# Patient Record
Sex: FEMALE | Race: WHITE | NOT HISPANIC OR LATINO | Employment: OTHER | URBAN - METROPOLITAN AREA
[De-identification: names, ages, dates, MRNs, and addresses within clinical notes are randomized per-mention and may not be internally consistent; named-entity substitution may affect disease eponyms.]

---

## 2017-03-08 ENCOUNTER — OFFICE VISIT (OUTPATIENT)
Dept: OTOLARYNGOLOGY | Facility: CLINIC | Age: 62
End: 2017-03-08
Payer: COMMERCIAL

## 2017-03-08 VITALS — WEIGHT: 207.6 LBS | HEIGHT: 67 IN | BODY MASS INDEX: 32.58 KG/M2 | RESPIRATION RATE: 18 BRPM

## 2017-03-08 DIAGNOSIS — J01.01 ACUTE RECURRENT MAXILLARY SINUSITIS: ICD-10-CM

## 2017-03-08 DIAGNOSIS — H69.91 DYSFUNCTION OF EUSTACHIAN TUBE, RIGHT: ICD-10-CM

## 2017-03-08 DIAGNOSIS — J31.0 CHRONIC RHINITIS: Primary | ICD-10-CM

## 2017-03-08 PROCEDURE — 99203 OFFICE O/P NEW LOW 30 MIN: CPT | Performed by: OTOLARYNGOLOGY

## 2017-03-08 ASSESSMENT — PAIN SCALES - GENERAL: PAINLEVEL: NO PAIN (0)

## 2017-03-08 NOTE — MR AVS SNAPSHOT
"              After Visit Summary   3/8/2017    Maria Ines Molina    MRN: [id number]           Patient Information     Date Of Birth          1955        Visit Information        Provider Department      3/8/2017 11:30 AM Zia Aguayo MD [facility]        Care Instructions    - allegra or zyrtec daily for allergies  - sudafed or over-the-counter nasal decongestant 30-45 minutes before flying or for ear  - \"pop\" as needed  - nasal saline rinses.        Follow-ups after your visit        Who to contact     If you have questions or need follow up information about today's clinic visit or your schedule please contact [facility] directly at [phone].  Normal or non-critical lab and imaging results will be communicated to you by [employer], letter or phone within 4 business days after the clinic has received the results. If you do not hear from us within 7 days, please contact the clinic through MyChart or phone. If you have a critical or abnormal lab result, we will notify you by phone as soon as possible.  Submit refill requests through [employer] or call your pharmacy and they will forward the refill request to us. Please allow 3 business days for your refill to be completed.          Additional Information About Your Visit        [employer] Information     [employer] lets you send messages to your doctor, view your test results, renew your prescriptions, schedule appointments and more. To sign up, go to [email or web address] . Click on \"Log in\" on the left side of the screen, which will take you to the Welcome page. Then click on \"Sign up Now\" on the right side of the page.     You will be asked to enter the access code listed below, as well as some personal information. Please follow the directions to create your username and password.     Your access code is: [email or web address]  Expires: 2017 11:51 AM     Your access code will  in 90 days. If you need help or a new code, please " "call your [place] clinic or [phone].        Care EveryWhere ID     This is your Care EveryWhere ID. This could be used by other organizations to access your [place] medical records  [id number]        Your Vitals Were     Respirations Height BMI (Body Mass Index)             18 1.69 m (5' 6.53\") 32.97 kg/m2          Blood Pressure from Last 3 Encounters:   03/18/15 150/80    Weight from Last 3 Encounters:   03/08/17 94.2 kg (207 lb 9.6 oz)              Today, you had the following     No orders found for display       Primary Care Provider    Unknown Primary MD José Manuel       No address on file        Thank you!     Thank you for choosing [facility]  for your care. Our goal is always to provide you with excellent care. Hearing back from our patients is one way we can continue to improve our services. Please take a few minutes to complete the written survey that you may receive in the mail after your visit with us. Thank you!             Your Updated Medication List - Protect others around you: Learn how to safely use, store and throw away your medicines at www.disposemymeds.org.      Notice  As of 3/8/2017 11:51 AM    You have not been prescribed any medications.      "

## 2017-03-08 NOTE — PATIENT INSTRUCTIONS
"- allegra or zyrtec daily for allergies  - sudafed or over-the-counter nasal decongestant 30-45 minutes before flying or for ear  - \"pop\" as needed  - nasal saline rinses.  "

## 2017-03-08 NOTE — PROGRESS NOTES
"Chief Complaint - sinusitis    History of Present Illness - Maria Ines Molina is a 62 year old female who presents for evaluation of possible recurrent sinusitis. The patient describes symptoms of itchy eyes, nasal drainage (white, sometimes green/yellow), cough, right ear plugging for the past few months (intermittently). She had right ear stinging with alcohol irrigations. These symptoms have recurred 3 times in the past year, and usually is only an issue once per year.  Treatments within the last 3 months have included antibiotics, over-the-counter medication, neti pot. No prior history of sinus surgery.    Past Medical History - healthy    Current Medications - vitamins    Allergies - No Known Allergies    Social History -   Social History     Social History     Marital status:      Spouse name: N/A     Number of children: N/A     Years of education: N/A     Social History Main Topics     Smoking status: Not on file     Smokeless tobacco: Not on file     Alcohol use Not on file     Drug use: Not on file     Sexual activity: Not on file     Other Topics Concern     Not on file     Social History Narrative     No narrative on file       Family History - sister had thyroid nodules.    Review of Systems - As per HPI and PMHx, some tender and swollen lymph nodes, otherwise 7 system review of the head and neck negative.    Physical Exam  Resp 18  Ht 1.69 m (5' 6.53\")  Wt 94.2 kg (207 lb 9.6 oz)  BMI 32.97 kg/m2  General - The patient is nontoxic, in no distress. Alert and oriented to person and place, answers questions and cooperates with examination appropriately.   Neurologic - CN II-XII are intact. No focal neurologic deficits.   Voice and Breathing - The patient was breathing comfortably without the use of accessory muscles. There was no wheezing, stridor, or stertor.  The patients voice was clear and strong.  Eyes - Extraocular movements intact.  Sclera were not icteric or injected, conjunctiva were pink and " moist.  Mouth - Examination of the oral cavity showed pink, healthy oral mucosa. No lesions or ulcerations noted.  The tongue was mobile and midline.  Throat - The walls of the oropharynx were smooth, symmetric, and had no lesions or ulcerations.  No postnasal drainage.  The uvula was midline on elevation.  Ears - Bilateral pinna and EACs with normal appearing overlying skin. Tympanic membrane intact bilaterally. No perforation on the right today. She can inflate the right ear. Bony landmarks of the ossicular chain are normal. No retraction, perforation, or masses.  No fluid or purulence was seen in the external canal or the middle ear.   Nose - External contour is symmetric, no gross deflection or scars.  Nasal mucosa is pink and moist with no abnormal mucus.  The septum was midline and non-obstructive, turbinates of normal size and position.  No polyps, masses, or purulence noted on examination.  Neck - Palpation of the occipital, submental, submandibular, internal jugular chain, and supraclavicular nodes did not demonstrate any abnormal lymph nodes or masses. No parotid masses. Palpation of the thyroid was soft and smooth, with no nodules or goiter appreciated.  The trachea was mobile and midline.        A/P - Maria Ines Molina is a 62 year old female with recurrent sinusitis versus viral upper respiratory infections. She has had 3 episodes, usually with flights to [place]. Maybe has some allergies in the [place] environment. I recommend treatment with nasal saline rinses, i offered flonase but she deferred. She should try an antihistamine when traveling. I also offered allergy testing if this fails. She can use sudafed with flights. right ear looks okay, probably some ETD. Okay to swim, no water precautions necessary. Return prn.     Zia Aguayo MD  Otolaryngology  [facility]

## 2017-10-12 ENCOUNTER — OFFICE VISIT (OUTPATIENT)
Dept: OTOLARYNGOLOGY | Facility: CLINIC | Age: 62
End: 2017-10-12
Payer: COMMERCIAL

## 2017-10-12 VITALS — WEIGHT: 212 LBS | BODY MASS INDEX: 33.27 KG/M2 | HEIGHT: 67 IN | TEMPERATURE: 98 F

## 2017-10-12 DIAGNOSIS — H69.91 DYSFUNCTION OF EUSTACHIAN TUBE, RIGHT: Primary | ICD-10-CM

## 2017-10-12 DIAGNOSIS — J06.9 VIRAL URI: ICD-10-CM

## 2017-10-12 PROCEDURE — 99213 OFFICE O/P EST LOW 20 MIN: CPT | Performed by: OTOLARYNGOLOGY

## 2017-10-12 NOTE — MR AVS SNAPSHOT
After Visit Summary   10/12/2017    Maria Ines Molina    MRN: [id number]           Patient Information     Date Of Birth          1955        Visit Information        Provider Department      10/12/2017 8:30 AM [facility]        Today's Diagnoses     Dysfunction of Eustachian tube, right    -  1    Viral URI          Care Instructions    General Scheduling Information  To schedule your CT/MRI scan, please contact [place] [facility] at [phone]   [address] [place] [address]    To schedule your Surgery, please contact our Specialty Schedulers at [phone]    ENT Clinic Locations Clinic Hours Telephone Number     [facility]  [address]  [place] [address]   Tuesday:       8:00am -- 4:00pm    Wednesday:  8:00am - 4:00pm   To schedule an appointment with   Dr. Aguayo,   please contact our   Specialty Scheduling Department at:     [phone]       [facility]  [address] [place] [address]   Friday:          8:00am - 4:00pm         Urgent Care Locations Clinic Hours Telephone Numbers     [facility]  [address] [place] [address]     Monday-Friday:     11:00pm - 9:00pm    Saturday-Sunday:  9:00am - 5:00pm   [phone]     [facility]  [address] [place] [address]     Monday-Friday:      5:00pm - 9:00pm     Saturday-Sunday:  9:00am - 5:00pm   [phone]                 Follow-ups after your visit        Who to contact     If you have questions or need follow up information about today's clinic visit or your schedule please contact [facility] directly at [phone].  Normal or non-critical lab and imaging results will be communicated to you by MyChart, letter or phone within 4 business days after the clinic has received the results. If you do not hear from us within 7 days, please contact the clinic through MyChart or phone. If you have a critical or abnormal  "lab result, we will notify you by phone as soon as possible.  Submit refill requests through [employer] or call your pharmacy and they will forward the refill request to us. Please allow 3 business days for your refill to be completed.          Additional Information About Your Visit        MyChart Information     [employer] lets you send messages to your doctor, view your test results, renew your prescriptions, schedule appointments and more. To sign up, go to [email or web address] . Click on \"Log in\" on the left side of the screen, which will take you to the Welcome page. Then click on \"Sign up Now\" on the right side of the page.     You will be asked to enter the access code listed below, as well as some personal information. Please follow the directions to create your username and password.     Your access code is: [email or web address]  Expires: 1/10/2018  9:13 AM     Your access code will  in 90 days. If you need help or a new code, please call your [place] clinic or [phone].        Care EveryWhere ID     This is your Care EveryWhere ID. This could be used by other organizations to access your [place] medical records  [id number]        Your Vitals Were     Temperature Height BMI (Body Mass Index)             98  F (36.7  C) (Tympanic) 1.689 m (5' 6.5\") 33.71 kg/m2          Blood Pressure from Last 3 Encounters:   03/18/15 150/80    Weight from Last 3 Encounters:   10/12/17 96.2 kg (212 lb)   17 94.2 kg (207 lb 9.6 oz)              Today, you had the following     No orders found for display       Primary Care Provider    None Specified       No primary provider on file.        Equal Access to Services     [facility]: Hadii jose Hernandez, waaxda luqadaha, qaybta kaalmada champ, carolyn pereira . So [facility] [phone].    ATENCIÓN: Si habla español, tiene a song disposición servicios gratuitos de asistencia lingüística. Llame al [phone].    We comply with " applicable federal civil rights laws and [place] laws. We do not discriminate on the basis of race, color, national origin, age, disability, sex, sexual orientation, or gender identity.            Thank you!     Thank you for choosing [facility]  for your care. Our goal is always to provide you with excellent care. Hearing back from our patients is one way we can continue to improve our services. Please take a few minutes to complete the written survey that you may receive in the mail after your visit with us. Thank you!             Your Updated Medication List - Protect others around you: Learn how to safely use, store and throw away your medicines at www.disposemymeds.org.      Notice  As of 10/12/2017  9:13 AM    You have not been prescribed any medications.

## 2017-10-12 NOTE — PATIENT INSTRUCTIONS
General Scheduling Information  To schedule your CT/MRI scan, please contact [place] [facility] at [phone]   [address] [place] [address]    To schedule your Surgery, please contact our Specialty Schedulers at [phone]    ENT Clinic Locations Clinic Hours Telephone Number     [facility]  [address] [place] [address]   Tuesday:       8:00am -- 4:00pm    Wednesday:  8:00am - 4:00pm   To schedule an appointment with   Dr. Aguayo,   please contact our   Specialty Scheduling Department at:     [phone]       [facility]  [address] [place] [address]   Friday:          8:00am - 4:00pm         Urgent Care Locations Clinic Hours Telephone Numbers     [facility]  [address] [place] [address]     Monday-Friday:     11:00pm - 9:00pm    Saturday-Sunday:  9:00am - 5:00pm   [phone]     [facility]  [address] [place] [address]     Monday-Friday:      5:00pm - 9:00pm     Saturday-Sunday:  9:00am - 5:00pm   [phone]

## 2017-10-12 NOTE — NURSING NOTE
"Chief Complaint   Patient presents with     Ear Problem     ear pain        Initial Temp 98  F (36.7  C) (Tympanic)  Ht 1.689 m (5' 6.5\")  Wt 96.2 kg (212 lb)  BMI 33.71 kg/m2 Estimated body mass index is 33.71 kg/(m^2) as calculated from the following:    Height as of this encounter: 1.689 m (5' 6.5\").    Weight as of this encounter: 96.2 kg (212 lb).  Medication Reconciliation: complete       Jerry Vega MA      "

## 2017-10-12 NOTE — PROGRESS NOTES
"Chief Complaint - recheck nose and ears    History of Present Illness - Maria Ines Molina is a 62 year old female who returns for evaluation of possible recurrent sinusitis versus recurrent upper respiratory infections. I saw her 3/2017 for this. At that time the patient described symptoms of itchy eyes, nasal drainage (white, sometimes green/yellow), cough, ear plugging. These symptoms were a recurrent problem which was unusual for her. Treatments have included antibiotics, over-the-counter medication, neti pot. No prior history of sinus surgery.    She returns and notes she felt sick 10 days ago. Had right ear pain, postnasal drainage, green, and sore throat. She is better. Still has myalgias and arthralgias. Her right ear still bothers her some. She tried claritin, mucinex.     Past Medical History - healthy    Current Medications - vitamins    Allergies - No Known Allergies    Social History -   Social History     Social History     Marital status:      Spouse name: N/A     Number of children: N/A     Years of education: N/A     Social History Main Topics     Smoking status: Not on file     Smokeless tobacco: Not on file     Alcohol use Not on file     Drug use: Not on file     Sexual activity: Not on file     Other Topics Concern     Not on file     Social History Narrative     No narrative on file   Nonsmoker.    Family History - sister had thyroid nodules.    Review of Systems - As per HPI and PMHx, one episode of blurry vision for 1 minute. Otherwise 7 system review of the head and neck negative.    Physical Exam  Temp 98  F (36.7  C) (Tympanic)  Ht 1.689 m (5' 6.5\")  Wt 96.2 kg (212 lb)  BMI 33.71 kg/m2  General - The patient is nontoxic, in no distress. Alert and oriented to person and place, answers questions and cooperates with examination appropriately.   Neurologic - CN II-XII are intact. No focal neurologic deficits.   Voice and Breathing - The patient was breathing comfortably without the use " of accessory muscles. There was no wheezing, stridor, or stertor.  The patients voice was clear and strong.  Eyes - Extraocular movements intact.  Sclera were not icteric or injected, conjunctiva were pink and moist.  Mouth - Examination of the oral cavity showed pink, healthy oral mucosa. No lesions or ulcerations noted.  The tongue was mobile and midline.  Throat - The walls of the oropharynx were smooth, symmetric, and had no lesions or ulcerations.  No postnasal drainage.  The uvula was midline on elevation.  Ears - Bilateral pinna and EACs with normal appearing overlying skin. Tympanic membrane intact bilaterally. No perforation on the right today. Bony landmarks of the ossicular chain are normal. No retraction, perforation, or masses.  No fluid or purulence was seen in the external canal or the middle ear.   Nose - External contour is symmetric, no gross deflection or scars.  Nasal mucosa is pink and moist with no abnormal mucus.  The septum was midline and non-obstructive, turbinates of normal size and position.  No polyps, masses, or purulence noted on examination.  Neck - Palpation of the occipital, submental, submandibular, internal jugular chain, and supraclavicular nodes did not demonstrate any abnormal lymph nodes or masses. No parotid masses. Palpation of the thyroid was soft and smooth, with no nodules or goiter appreciated.  The trachea was mobile and midline.        A/P - Maria Ines Molina is a 62 year old female with a recent viral upper respiratory infection. First in 6 months since I last saw her. She gets intermittent right ear aches with these episodes of upper respiratory infection. This seems like eustachian tube dysfunction. I recommend treatment with nasal saline rinses, sudafed, and mucinex prn. Okay to swim, no water precautions necessary. Return prn.     Zia Aguayo MD  Otolaryngology  [facility]

## 2018-02-27 ENCOUNTER — DOCUMENTATION ONLY (OUTPATIENT)
Dept: FAMILY MEDICINE | Facility: OTHER | Age: 63
End: 2018-02-27

## 2018-02-27 PROBLEM — M17.9 KNEE OSTEOARTHRITIS: Status: ACTIVE | Noted: 2018-02-27

## 2018-02-27 PROBLEM — E66.9 OBESITY: Status: ACTIVE | Noted: 2018-02-27

## 2019-03-06 ENCOUNTER — TRANSFERRED RECORDS (OUTPATIENT)
Dept: HEALTH INFORMATION MANAGEMENT | Facility: OTHER | Age: 64
End: 2019-03-06

## 2020-07-19 ENCOUNTER — HOSPITAL ENCOUNTER (EMERGENCY)
Facility: CLINIC | Age: 65
Discharge: HOME | End: 2020-07-19
Attending: PHYSICIAN ASSISTANT | Admitting: PHYSICIAN ASSISTANT
Payer: MEDICARE

## 2020-07-19 ENCOUNTER — APPOINTMENT (OUTPATIENT)
Dept: ULTRASOUND IMAGING | Facility: CLINIC | Age: 65
End: 2020-07-19
Attending: PHYSICIAN ASSISTANT
Payer: MEDICARE

## 2020-07-19 VITALS
HEART RATE: 76 BPM | DIASTOLIC BLOOD PRESSURE: 131 MMHG | BODY MASS INDEX: 31.8 KG/M2 | OXYGEN SATURATION: 100 % | SYSTOLIC BLOOD PRESSURE: 169 MMHG | TEMPERATURE: 97.6 F | RESPIRATION RATE: 16 BRPM | WEIGHT: 200 LBS

## 2020-07-19 DIAGNOSIS — R10.11 RUQ ABDOMINAL PAIN: ICD-10-CM

## 2020-07-19 LAB
ALBUMIN UR-MCNC: NEGATIVE MG/DL
APPEARANCE UR: CLEAR
BILIRUB UR QL STRIP: NEGATIVE
COLOR UR AUTO: YELLOW
GLUCOSE UR STRIP-MCNC: NEGATIVE MG/DL
HGB UR QL STRIP: NEGATIVE
KETONES UR STRIP-MCNC: 5 MG/DL
LEUKOCYTE ESTERASE UR QL STRIP: ABNORMAL
MUCOUS THREADS #/AREA URNS LPF: PRESENT /LPF
NITRATE UR QL: NEGATIVE
PH UR STRIP: 7 PH (ref 5–7)
RBC #/AREA URNS AUTO: 1 /HPF (ref 0–2)
SOURCE: ABNORMAL
SP GR UR STRIP: 1.01 (ref 1–1.03)
SQUAMOUS #/AREA URNS AUTO: 1 /HPF (ref 0–1)
UROBILINOGEN UR STRIP-MCNC: 0 MG/DL (ref 0–2)
WBC #/AREA URNS AUTO: 3 /HPF (ref 0–5)

## 2020-07-19 PROCEDURE — 81001 URINALYSIS AUTO W/SCOPE: CPT | Performed by: PHYSICIAN ASSISTANT

## 2020-07-19 PROCEDURE — 99284 EMERGENCY DEPT VISIT MOD MDM: CPT | Mod: 25 | Performed by: PHYSICIAN ASSISTANT

## 2020-07-19 PROCEDURE — 87086 URINE CULTURE/COLONY COUNT: CPT | Performed by: PHYSICIAN ASSISTANT

## 2020-07-19 PROCEDURE — 76705 ECHO EXAM OF ABDOMEN: CPT

## 2020-07-19 PROCEDURE — 99284 EMERGENCY DEPT VISIT MOD MDM: CPT | Mod: Z6 | Performed by: PHYSICIAN ASSISTANT

## 2020-07-19 NOTE — ED PROVIDER NOTES
History     Chief Complaint   Patient presents with     Abdominal Pain     has had ct, mri for same     HPI  Maria Ines Molina is a 65 year old female who  works as a chiropractor who reports she is otherwise overall healthy who presents to the emergency department with concern over right upper quadrant abdominal pain which is been present for the last 3 weeks.  Pain is described as an aching that gradually worsens throughout the day.  Is also exacerbated by changes in position especially when she has forward or left lateral flexion.  Does radiate minimally to her back where she describes a burning sensation.   She has attempted to treat intermittently with ibuprofen but not consistently.  She denies any other associated symptoms.  She specifically denies any fever, chills, myalgias, cough,chest pains, dyspnea, wheezing, nausea, vomiting, diarrhea, melena, hematochezia, dysuria, urinary frequency, urgency or hematuria.  Patient initially attributed her symptoms to mechanical musculoskeletal pain and did have have family members who work as a chiropractor and a physician assailant order testing including thoracic spine X-ray and MRI, non-contrast CT scan of the chest and urinalysis and was treated for possible urinary tract infection after having small amount of leukocyte esterase.  She took one day of Cipro, however medication was switched to keflex which she has been taking for two days after she was noted to have thoracic aortic aneurysm on CT.  Her past surgical history is patient for bladder sling procedure.  She denies any significant ETOH or drug use.  She did schedule follow up with PCP within the next week.      Allergies:  No Known Allergies    Problem List:    Patient Active Problem List    Diagnosis Date Noted     Knee osteoarthritis 02/27/2018     Priority: Medium     Obesity 02/27/2018     Priority: Medium     Overview:   BMI 34+  Abdominal girth greater than 35       Special screening for malignant  neoplasms, colon 10/11/2012     Priority: Medium        Past Medical History:    No past medical history on file.    Past Surgical History:    Past Surgical History:   Procedure Laterality Date     COLONOSCOPY  10/11/2012    Diverticulosis, otherwise negative       Family History:    No family history on file.    Social History:  Marital Status:   [2]  Social History     Tobacco Use     Smoking status: Never Smoker     Smokeless tobacco: Never Used   Substance Use Topics     Alcohol use: Not on file     Drug use: Not on file        Medications:    No current outpatient medications on file.    Review of Systems   Constitutional: Negative for chills and fever.   HENT: Negative for congestion, ear pain and sore throat.    Respiratory: Negative for cough, wheezing and stridor.    Cardiovascular: Negative for chest pain and palpitations.   Gastrointestinal: Positive for abdominal pain (right upper quadrant). Negative for constipation, diarrhea, nausea and vomiting.   Genitourinary: Negative for dysuria, flank pain, frequency, hematuria and urgency.   Musculoskeletal: Positive for back pain. Negative for neck pain.   Skin: Negative for color change, rash and wound.   Neurological: Negative for dizziness, weakness, light-headedness, numbness and headaches.   All other systems reviewed and are negative.    Physical Exam   BP: (!) 167/104  Pulse: 76  Temp: 97.6  F (36.4  C)  Resp: 16  Weight: 90.7 kg (200 lb)  SpO2: 98 %  Physical Exam  GENERAL APPEARANCE: healthy, alert and no distress  EYES: EOMI,  PERRL, conjunctiva clear  HENT: ear canals and TM's normal.  Nose and mouth without ulcers, erythema or lesions  NECK: supple, nontender, no lymphadenopathy  RESP: lungs clear to auscultation - no rales, rhonchi or wheezes  CV: regular rates and rhythm, normal S1 S2, no murmur noted  ABDOMEN:  Soft, mild tenderness to palpation of far lateral right upper abdomen without rebound, guarding,  no HSM or masses and bowel  sounds normal, pain is clearly reproducible by changes in movement and palpation.    SKIN: no suspicious lesions or rashes  ED Course        Procedures        Critical Care time:  none        Results for orders placed or performed during the hospital encounter of 07/19/20   US Abdomen Limited (RUQ)     Status: None    Narrative    US ABDOMEN LIMITED   7/19/2020 4:13 PM     HISTORY:  RUQ pain for the last 3 weeks    COMPARISON: None.    FINDINGS:    Gallbladder: Normal with no cholelithiasis, wall thickening or focal  tenderness.      Bile ducts:  CHD is normal diameter.  No intrahepatic biliary  dilatation.    Liver: Increased echogenicity consistent with steatosis.    Pancreas:  Obscured by overlying bowel gas.    Right kidney:  Normal.     Aorta and IVC:  Not specifically assessed.       Impression    IMPRESSION:    1. No cholelithiasis or sonographic evidence of acute cholecystitis.  2. Hepatic steatosis.    CARLOS DOMINGUEZ MD   UA with Microscopic reflex to Culture     Status: Abnormal    Specimen: Midstream Urine   Result Value Ref Range    Color Urine Yellow     Appearance Urine Clear     Glucose Urine Negative NEG^Negative mg/dL    Bilirubin Urine Negative NEG^Negative    Ketones Urine 5 (A) NEG^Negative mg/dL    Specific Gravity Urine 1.013 1.003 - 1.035    Blood Urine Negative NEG^Negative    pH Urine 7.0 5.0 - 7.0 pH    Protein Albumin Urine Negative NEG^Negative mg/dL    Urobilinogen mg/dL 0.0 0.0 - 2.0 mg/dL    Nitrite Urine Negative NEG^Negative    Leukocyte Esterase Urine Trace (A) NEG^Negative    Source Midstream Urine     WBC Urine 3 0 - 5 /HPF    RBC Urine 1 0 - 2 /HPF    Squamous Epithelial /HPF Urine 1 0 - 1 /HPF    Mucous Urine Present (A) NEG^Negative /LPF   Urine Culture Aerobic Bacterial     Status: None    Specimen: Midstream Urine   Result Value Ref Range    Specimen Description Midstream Urine     Special Requests Specimen received in preservative     Culture Micro No growth       Medications - No data to display    Assessments & Plan (with Medical Decision Making)     I have reviewed the nursing notes.    I have reviewed the findings, diagnosis, plan and need for follow up with the patient.       New Prescriptions    No medications on file     Final diagnoses:   RUQ abdominal pain     C5-year-old female presents to the emergency department with concern over 3-week history of right upper quadrant abdominal pain.  She had significantly elevated blood pressure upon arrival, remainder vital signs were stable.  Physical exam findings as described above are significant for tenderness palpation of the far lateral aspect of the right upper quadrant without rebound or guarding.  Patient did bring in multiple imaging and lab reports from outside facility which were done within the last 48 hours.  She had repeat urinalysis here today which did continue to show trace leukocyte esterase however I do not suspect pyelonephritis or acute cystitis at this time and instructed patient that she may discontinue antibiotics pending urine culture from today's visit.  I did discuss with her/benefits of weekly blood work and patient declined.  She was amenable to ultrasound of her right upper quadrant which was negative for evidence of cholecystitis, cholelithiasis, common hepatic duct is normal diameter, no intrahepatic biliary dilatation.  There is increased echogenicity of the liver consistent with steatosis, right kidney was normal.  Unclear exact etiology of patient's symptoms would favor mechanical musculoskeletal pain given reproducible nature.  I do not suspect pneumonia, PE or lung abnormality as source of her symptoms.  She was instructed to follow up with PCP as scheduled later this week.  Worrisome reasons to return to ER discussed.      Disclaimer: This note consists of symbols derived from keyboarding, dictation, and/or voice recognition software. As a result, there may be errors in the script that  have gone undetected.  Please consider this when interpreting information found in the chart.    7/19/2020   [facility] EMERGENCY DEPARTMENT     Kendal Ramos PA-C  07/23/20 1802

## 2020-07-19 NOTE — ED AVS SNAPSHOT
[facility]  [address] [address]  Phone:  [phone]  Fax:  [phone]                                    Maria Ines Molina   MRN: [id number]    Department:  [facility]   Date of Visit:  7/19/2020           After Visit Summary Signature Page    I have received my discharge instructions, and my questions have been answered. I have discussed any challenges I see with this plan with the nurse or doctor.    ..........................................................................................................................................  Patient/Patient Representative Signature      ..........................................................................................................................................  Patient Representative Print Name and Relationship to Patient    ..................................................               ................................................  Date                                   Time    ..........................................................................................................................................  Reviewed by Signature/Title    ...................................................              ..............................................  Date                                               Time          22EPIC Rev 08/18

## 2020-07-19 NOTE — ED NOTES
Pt reports abdominal pain since the end of June under her right ribs that feels like a tennis ball. Worse at the end of the day. Eating makes no difference. Pt has had labs,  Urine, mri and ct with nothing found. Daughter is PA.

## 2020-07-20 LAB
BACTERIA SPEC CULT: NO GROWTH
Lab: NORMAL
SPECIMEN SOURCE: NORMAL

## 2020-07-21 NOTE — RESULT ENCOUNTER NOTE
Final urine culture report is NEGATIVE per [facility] Lab Result protocol.    If NEGATIVE result, no change in treatment, per [facility] Lab Result protocol.

## 2020-07-23 ASSESSMENT — ENCOUNTER SYMPTOMS
WOUND: 0
DIZZINESS: 0
NECK PAIN: 0
NAUSEA: 0
ABDOMINAL PAIN: 1
CONSTIPATION: 0
CHILLS: 0
FLANK PAIN: 0
FREQUENCY: 0
NUMBNESS: 0
HEMATURIA: 0
COUGH: 0
FEVER: 0
WEAKNESS: 0
COLOR CHANGE: 0
BACK PAIN: 1
WHEEZING: 0
SORE THROAT: 0
HEADACHES: 0
PALPITATIONS: 0
DYSURIA: 0
LIGHT-HEADEDNESS: 0
STRIDOR: 0
VOMITING: 0
DIARRHEA: 0

## 2020-08-10 ENCOUNTER — COMMUNICATION - HEALTHEAST (OUTPATIENT)
Dept: CARDIOLOGY | Facility: CLINIC | Age: 65
End: 2020-08-10

## 2020-08-11 ENCOUNTER — OFFICE VISIT - HEALTHEAST (OUTPATIENT)
Dept: CARDIOLOGY | Facility: CLINIC | Age: 65
End: 2020-08-11

## 2020-08-11 DIAGNOSIS — E78.00 HYPERCHOLESTEROLEMIA: ICD-10-CM

## 2020-08-11 DIAGNOSIS — I10 ESSENTIAL HYPERTENSION, BENIGN: ICD-10-CM

## 2020-08-11 DIAGNOSIS — I71.20 THORACIC AORTIC ANEURYSM WITHOUT RUPTURE (H): ICD-10-CM

## 2020-08-11 DIAGNOSIS — E66.9 OBESITY: ICD-10-CM

## 2020-08-11 LAB
ATRIAL RATE - MUSE: 71 BPM
DIASTOLIC BLOOD PRESSURE - MUSE: NORMAL
INTERPRETATION ECG - MUSE: NORMAL
P AXIS - MUSE: 46 DEGREES
PR INTERVAL - MUSE: 162 MS
QRS DURATION - MUSE: 84 MS
QT - MUSE: 402 MS
QTC - MUSE: 436 MS
R AXIS - MUSE: 20 DEGREES
SYSTOLIC BLOOD PRESSURE - MUSE: NORMAL
T AXIS - MUSE: 64 DEGREES
VENTRICULAR RATE- MUSE: 71 BPM

## 2020-08-11 ASSESSMENT — MIFFLIN-ST. JEOR: SCORE: 1470.04

## 2020-08-13 ENCOUNTER — COMMUNICATION - HEALTHEAST (OUTPATIENT)
Dept: CARDIOLOGY | Facility: CLINIC | Age: 65
End: 2020-08-13

## 2020-08-13 ENCOUNTER — AMBULATORY - HEALTHEAST (OUTPATIENT)
Dept: CARDIOLOGY | Facility: CLINIC | Age: 65
End: 2020-08-13

## 2020-08-20 ENCOUNTER — HOSPITAL ENCOUNTER (OUTPATIENT)
Dept: CT IMAGING | Facility: CLINIC | Age: 65
Discharge: HOME | End: 2020-08-20
Attending: INTERNAL MEDICINE

## 2020-08-20 ENCOUNTER — HOSPITAL ENCOUNTER (OUTPATIENT)
Dept: CARDIOLOGY | Facility: CLINIC | Age: 65
Discharge: HOME | End: 2020-08-20
Attending: INTERNAL MEDICINE

## 2020-08-20 DIAGNOSIS — I71.20 THORACIC AORTIC ANEURYSM WITHOUT RUPTURE (H): ICD-10-CM

## 2020-08-20 DIAGNOSIS — I10 ESSENTIAL HYPERTENSION, BENIGN: ICD-10-CM

## 2020-08-20 LAB
AORTIC ROOT: 3.8 CM
AORTIC VALVE MEAN VELOCITY: 230 CM/S
ASCENDING AORTA: 4.3 CM
AV DIMENSIONLESS INDEX VTI: 0.3
AV MEAN GRADIENT: 25 MMHG
AV PEAK GRADIENT: 44.6 MMHG
AV VALVE AREA: 0.9 CM2
AV VELOCITY RATIO: 0.3
BSA FOR ECHO PROCEDURE: 2.06 M2
CREAT BLD-MCNC: 0.9 MG/DL (ref 0.6–1.1)
CV BLOOD PRESSURE: ABNORMAL MMHG
CV ECHO HEIGHT: 65 IN
CV ECHO WEIGHT: 205 LBS
DOP CALC AO PEAK VEL: 334 CM/S
DOP CALC AO VTI: 65.8 CM
DOP CALC LVOT AREA: 2.83 CM2
DOP CALC LVOT DIAMETER: 1.9 CM
DOP CALC LVOT PEAK VEL: 104 CM/S
DOP CALC LVOT STROKE VOLUME: 60.6 CM3
DOP CALCLVOT PEAK VEL VTI: 21.4 CM
EJECTION FRACTION: 61 % (ref 55–75)
FRACTIONAL SHORTENING: 29.3 % (ref 28–44)
GFR SERPL CREATININE-BSD FRML MDRD: >60 ML/MIN/1.73M2
INTERVENTRICULAR SEPTUM IN END DIASTOLE: 0.86 CM (ref 0.6–0.9)
IVS/PW RATIO: 0.8
LA AREA 1: 11 CM2
LA AREA 2: 13 CM2
LEFT ATRIUM LENGTH: 4 CM
LEFT ATRIUM SIZE: 2.4 CM
LEFT ATRIUM TO AORTIC ROOT RATIO: 0.63 NO UNITS
LEFT ATRIUM VOLUME INDEX: 14.8 ML/M2
LEFT ATRIUM VOLUME: 30.4 ML
LEFT VENTRICLE DIASTOLIC VOLUME INDEX: 30.7 CM3/M2 (ref 29–61)
LEFT VENTRICLE DIASTOLIC VOLUME: 63.2 CM3 (ref 46–106)
LEFT VENTRICLE MASS INDEX: 51.9 G/M2
LEFT VENTRICLE SYSTOLIC VOLUME INDEX: 11.8 CM3/M2 (ref 8–24)
LEFT VENTRICLE SYSTOLIC VOLUME: 24.4 CM3 (ref 14–42)
LEFT VENTRICULAR INTERNAL DIMENSION IN DIASTOLE: 3.76 CM (ref 3.8–5.2)
LEFT VENTRICULAR INTERNAL DIMENSION IN SYSTOLE: 2.66 CM (ref 2.2–3.5)
LEFT VENTRICULAR MASS: 106.8 G
LEFT VENTRICULAR OUTFLOW TRACT MEAN GRADIENT: 2 MMHG
LEFT VENTRICULAR OUTFLOW TRACT MEAN VELOCITY: 71 CM/S
LEFT VENTRICULAR OUTFLOW TRACT PEAK GRADIENT: 4 MMHG
LEFT VENTRICULAR POSTERIOR WALL IN END DIASTOLE: 1.03 CM (ref 0.6–0.9)
LV STROKE VOLUME INDEX: 29.4 ML/M2
MITRAL VALVE E/A RATIO: 0.8
MV AVERAGE E/E' RATIO: 7 CM/S
MV DECELERATION TIME: 289 MS
MV E'TISSUE VEL-LAT: 11 CM/S
MV E'TISSUE VEL-MED: 10.4 CM/S
MV LATERAL E/E' RATIO: 6.8
MV MEDIAL E/E' RATIO: 7.2
MV PEAK A VELOCITY: 100 CM/S
MV PEAK E VELOCITY: 75.2 CM/S
NUC REST DIASTOLIC VOLUME INDEX: 3280 LBS
NUC REST SYSTOLIC VOLUME INDEX: 65 IN
TRICUSPID REGURGITATION PEAK PRESSURE GRADIENT: 16.6 MMHG
TRICUSPID VALVE ANULAR PLANE SYSTOLIC EXCURSION: 2.3 CM
TRICUSPID VALVE PEAK REGURGITANT VELOCITY: 204 CM/S

## 2020-08-20 ASSESSMENT — MIFFLIN-ST. JEOR: SCORE: 1460.75

## 2020-08-21 ENCOUNTER — AMBULATORY - HEALTHEAST (OUTPATIENT)
Dept: CARDIOLOGY | Facility: CLINIC | Age: 65
End: 2020-08-21

## 2020-08-21 ENCOUNTER — COMMUNICATION - HEALTHEAST (OUTPATIENT)
Dept: CARDIOLOGY | Facility: CLINIC | Age: 65
End: 2020-08-21

## 2020-08-21 DIAGNOSIS — Z11.59 ENCOUNTER FOR SCREENING FOR OTHER VIRAL DISEASES: ICD-10-CM

## 2020-08-21 DIAGNOSIS — I35.9 AORTIC VALVE DISEASE: ICD-10-CM

## 2020-08-21 DIAGNOSIS — I71.20 THORACIC AORTIC ANEURYSM WITHOUT RUPTURE (H): ICD-10-CM

## 2020-08-23 ENCOUNTER — NURSE TRIAGE (OUTPATIENT)
Dept: NURSING | Facility: CLINIC | Age: 65
End: 2020-08-23

## 2020-08-23 NOTE — TELEPHONE ENCOUNTER
Covid test tomorrow in [place] for procedure on Thursday. Just left her house - doesn't have her ID or insurance card. Is wondering if they will need it - advised patient that her ID will be required at the drive-up testing site.    Jennifer Foley RN on 8/23/2020 at 4:36 PM    Additional Information    Negative: [1] Caller is not with the adult (patient) AND [2] reporting urgent symptoms    Negative: Lab result questions    Negative: Medication questions    Negative: Caller can't be reached by phone    Negative: Caller has already spoken to PCP or another triager    Negative: RN needs further essential information from caller in order to complete triage    Negative: Requesting regular office appointment    Negative: [1] Caller requesting NON-URGENT health information AND [2] PCP's office is the best resource    Negative: Health Information question, no triage required and triager able to answer question    General information question, no triage required and triager able to answer question    Protocols used: INFORMATION ONLY CALL-A-

## 2020-08-24 DIAGNOSIS — Z11.59 ENCOUNTER FOR SCREENING FOR OTHER VIRAL DISEASES: Primary | ICD-10-CM

## 2020-08-24 PROCEDURE — U0003 INFECTIOUS AGENT DETECTION BY NUCLEIC ACID (DNA OR RNA); SEVERE ACUTE RESPIRATORY SYNDROME CORONAVIRUS 2 (SARS-COV-2) (CORONAVIRUS DISEASE [COVID-19]), AMPLIFIED PROBE TECHNIQUE, MAKING USE OF HIGH THROUGHPUT TECHNOLOGIES AS DESCRIBED BY CMS-2020-01-R: HCPCS | Performed by: INTERNAL MEDICINE

## 2020-08-25 ENCOUNTER — SURGERY - HEALTHEAST (OUTPATIENT)
Dept: CARDIOLOGY | Facility: CLINIC | Age: 65
End: 2020-08-25

## 2020-08-25 ENCOUNTER — AMBULATORY - HEALTHEAST (OUTPATIENT)
Dept: CARDIOLOGY | Facility: CLINIC | Age: 65
End: 2020-08-25

## 2020-08-25 DIAGNOSIS — I71.20 THORACIC AORTIC ANEURYSM WITHOUT RUPTURE (H): ICD-10-CM

## 2020-08-25 DIAGNOSIS — I35.9 AORTIC VALVE DISEASE: ICD-10-CM

## 2020-08-25 LAB
SARS-COV-2 RNA SPEC QL NAA+PROBE: NOT DETECTED
SPECIMEN SOURCE: NORMAL

## 2020-08-27 ENCOUNTER — COMMUNICATION - HEALTHEAST (OUTPATIENT)
Dept: CARDIOLOGY | Facility: CLINIC | Age: 65
End: 2020-08-27

## 2020-08-28 ENCOUNTER — OFFICE VISIT - HEALTHEAST (OUTPATIENT)
Dept: CARDIOLOGY | Facility: CLINIC | Age: 65
End: 2020-08-28

## 2020-08-28 DIAGNOSIS — I71.20 THORACIC AORTIC ANEURYSM WITHOUT RUPTURE (H): ICD-10-CM

## 2020-08-28 ASSESSMENT — MIFFLIN-ST. JEOR: SCORE: 1480.93

## 2020-09-01 ENCOUNTER — AMBULATORY - HEALTHEAST (OUTPATIENT)
Dept: CARDIOLOGY | Facility: CLINIC | Age: 65
End: 2020-09-01

## 2020-09-01 ENCOUNTER — COMMUNICATION - HEALTHEAST (OUTPATIENT)
Dept: CARDIOLOGY | Facility: CLINIC | Age: 65
End: 2020-09-01

## 2020-09-01 DIAGNOSIS — R07.9 CHEST PAIN, UNSPECIFIED TYPE: ICD-10-CM

## 2020-11-09 ENCOUNTER — TRANSFERRED RECORDS (OUTPATIENT)
Dept: HEALTH INFORMATION MANAGEMENT | Facility: OTHER | Age: 65
End: 2020-11-09

## 2020-11-10 ENCOUNTER — HOSPITAL ENCOUNTER (OUTPATIENT)
Dept: NUCLEAR MEDICINE | Facility: CLINIC | Age: 65
Discharge: HOME | End: 2020-11-10
Attending: SURGERY

## 2020-11-10 ENCOUNTER — HOSPITAL ENCOUNTER (OUTPATIENT)
Dept: CARDIOLOGY | Facility: CLINIC | Age: 65
Discharge: HOME | End: 2020-11-10
Attending: SURGERY

## 2020-11-10 ENCOUNTER — COMMUNICATION - HEALTHEAST (OUTPATIENT)
Dept: TELEHEALTH | Facility: CLINIC | Age: 65
End: 2020-11-10

## 2020-11-10 DIAGNOSIS — R07.9 CHEST PAIN, UNSPECIFIED TYPE: ICD-10-CM

## 2020-11-10 LAB
CV STRESS CURRENT BP HE: NORMAL
CV STRESS CURRENT HR HE: 100
CV STRESS CURRENT HR HE: 101
CV STRESS CURRENT HR HE: 102
CV STRESS CURRENT HR HE: 111
CV STRESS CURRENT HR HE: 113
CV STRESS CURRENT HR HE: 114
CV STRESS CURRENT HR HE: 115
CV STRESS CURRENT HR HE: 123
CV STRESS CURRENT HR HE: 129
CV STRESS CURRENT HR HE: 129
CV STRESS CURRENT HR HE: 130
CV STRESS CURRENT HR HE: 130
CV STRESS CURRENT HR HE: 134
CV STRESS CURRENT HR HE: 138
CV STRESS CURRENT HR HE: 139
CV STRESS CURRENT HR HE: 141
CV STRESS CURRENT HR HE: 86
CV STRESS CURRENT HR HE: 86
CV STRESS CURRENT HR HE: 88
CV STRESS CURRENT HR HE: 90
CV STRESS CURRENT HR HE: 91
CV STRESS CURRENT HR HE: 91
CV STRESS CURRENT HR HE: 96
CV STRESS CURRENT HR HE: 98
CV STRESS DEVIATION TIME HE: NORMAL
CV STRESS ECHO PERCENT HR HE: NORMAL
CV STRESS EXERCISE STAGE HE: NORMAL
CV STRESS EXERCISE STAGE REACHED HE: NORMAL
CV STRESS FINAL RESTING BP HE: NORMAL
CV STRESS FINAL RESTING HR HE: 88
CV STRESS MAX HR HE: 142
CV STRESS MAX TREADMILL GRADE HE: 14
CV STRESS MAX TREADMILL SPEED HE: 3.4
CV STRESS PEAK DIA BP HE: NORMAL
CV STRESS PEAK SYS BP HE: NORMAL
CV STRESS PHASE HE: NORMAL
CV STRESS PROTOCOL HE: NORMAL
CV STRESS RESTING PT POSITION HE: NORMAL
CV STRESS RESTING PT POSITION HE: NORMAL
CV STRESS ST DEVIATION AMOUNT HE: NORMAL
CV STRESS ST DEVIATION ELEVATION HE: NORMAL
CV STRESS ST EVELATION AMOUNT HE: NORMAL
CV STRESS TEST TYPE HE: NORMAL
CV STRESS TOTAL STAGE TIME MIN 1 HE: NORMAL
RATE PRESSURE PRODUCT: NORMAL
STRESS ECHO BASELINE DIASTOLIC HE: 92
STRESS ECHO BASELINE HR: 87
STRESS ECHO BASELINE SYSTOLIC BP: 146
STRESS ECHO CALCULATED PERCENT HR: 93 %
STRESS ECHO LAST STRESS DIASTOLIC BP: 86
STRESS ECHO LAST STRESS HR: 141
STRESS ECHO LAST STRESS SYSTOLIC BP: 160
STRESS ECHO POST ESTIMATED WORKLOAD: 8.1
STRESS ECHO POST EXERCISE DUR MIN: 6
STRESS ECHO POST EXERCISE DUR SEC: 40
STRESS ECHO TARGET HR: 153

## 2020-12-03 ENCOUNTER — TELEPHONE (OUTPATIENT)
Dept: CARDIOLOGY | Facility: CLINIC | Age: 65
End: 2020-12-03

## 2020-12-03 ENCOUNTER — COMMUNICATION - HEALTHEAST (OUTPATIENT)
Dept: CARDIOLOGY | Facility: CLINIC | Age: 65
End: 2020-12-03

## 2020-12-03 DIAGNOSIS — I71.21 ASCENDING AORTIC ANEURYSM (H): Primary | ICD-10-CM

## 2020-12-03 DIAGNOSIS — Q23.81 BICUSPID AORTIC VALVE: ICD-10-CM

## 2020-12-08 ENCOUNTER — TELEPHONE (OUTPATIENT)
Dept: CARDIOLOGY | Facility: CLINIC | Age: 65
End: 2020-12-08

## 2020-12-08 NOTE — TELEPHONE ENCOUNTER
12/3/2020:    Anette Coleman, RN   Registered Nurse      Telephone Encounter   Addendum   Encounter Date:  12/3/2020                    Imaging ordered at [facility], left message for patient to call to review plan going forward.     ----- Message from Alfredo Morales MD sent at 11/12/2020  7:42 PM CST -----  Regarding: RE: Nuc stress test is scheduled for 11/5/20 @ [facility]     Yes that would be great.     Thank you  Sunil  ----- Message -----  From: Anette Coleman, RN  Sent: 11/11/2020   7:56 AM CST  To: Alfredo Morales MD  Subject: FW: Nuc stress test is scheduled for 11/5/20#     She had her stress test and looks like all is well. I will call her, and we'll stick to your plan of repeating CTA and echo in 6 months. I imagine at the [facility]

## 2021-01-21 ENCOUNTER — COMMUNICATION - HEALTHEAST (OUTPATIENT)
Dept: CARDIOLOGY | Facility: CLINIC | Age: 66
End: 2021-01-21

## 2021-01-21 DIAGNOSIS — I10 ESSENTIAL HYPERTENSION, BENIGN: ICD-10-CM

## 2021-03-22 ENCOUNTER — AMBULATORY - HEALTHEAST (OUTPATIENT)
Dept: CARDIOLOGY | Facility: CLINIC | Age: 66
End: 2021-03-22

## 2021-03-22 DIAGNOSIS — I71.20 THORACIC AORTIC ANEURYSM WITHOUT RUPTURE (H): ICD-10-CM

## 2021-03-26 ENCOUNTER — COMMUNICATION - HEALTHEAST (OUTPATIENT)
Dept: CARDIOLOGY | Facility: CLINIC | Age: 66
End: 2021-03-26

## 2021-04-13 ENCOUNTER — HOSPITAL ENCOUNTER (OUTPATIENT)
Dept: CARDIOLOGY | Facility: CLINIC | Age: 66
Discharge: HOME | End: 2021-04-13
Attending: SURGERY

## 2021-04-13 DIAGNOSIS — I71.20 THORACIC AORTIC ANEURYSM WITHOUT RUPTURE (H): ICD-10-CM

## 2021-04-13 LAB
AORTIC ROOT: 3.8 CM
AORTIC VALVE MEAN VELOCITY: 218 CM/S
ASCENDING AORTA: 5.1 CM
AV DIMENSIONLESS INDEX VTI: 0.4
AV MEAN GRADIENT: 22 MMHG
AV PEAK GRADIENT: 38.7 MMHG
AV VALVE AREA: 1.4 CM2
AV VELOCITY RATIO: 0.4
BSA FOR ECHO PROCEDURE: 2.16 M2
CV BLOOD PRESSURE: ABNORMAL MMHG
CV ECHO HEIGHT: 65 IN
CV ECHO WEIGHT: 225 LBS
DOP CALC AO PEAK VEL: 311 CM/S
DOP CALC AO VTI: 65.7 CM
DOP CALC LVOT AREA: 3.8 CM2
DOP CALC LVOT DIAMETER: 2.2 CM
DOP CALC LVOT PEAK VEL: 119 CM/S
DOP CALC LVOT STROKE VOLUME: 91.6 CM3
DOP CALCLVOT PEAK VEL VTI: 24.1 CM
EJECTION FRACTION: 77 % (ref 55–75)
FRACTIONAL SHORTENING: 45.4 % (ref 28–44)
INTERVENTRICULAR SEPTUM IN END DIASTOLE: 0.99 CM (ref 0.6–0.9)
IVS/PW RATIO: 1.2
LA AREA 1: 13.5 CM2
LA AREA 2: 15.4 CM2
LEFT ATRIUM LENGTH: 4.32 CM
LEFT ATRIUM VOLUME INDEX: 18.9 ML/M2
LEFT ATRIUM VOLUME: 40.9 ML
LEFT VENTRICLE CARDIAC INDEX: 3.4 L/MIN/M2
LEFT VENTRICLE CARDIAC OUTPUT: 7.3 L/MIN
LEFT VENTRICLE DIASTOLIC VOLUME INDEX: 32.4 CM3/M2 (ref 29–61)
LEFT VENTRICLE DIASTOLIC VOLUME: 70 CM3 (ref 46–106)
LEFT VENTRICLE HEART RATE: 80 BPM
LEFT VENTRICLE MASS INDEX: 51.4 G/M2
LEFT VENTRICLE SYSTOLIC VOLUME INDEX: 7.4 CM3/M2 (ref 8–24)
LEFT VENTRICLE SYSTOLIC VOLUME: 16 CM3 (ref 14–42)
LEFT VENTRICULAR INTERNAL DIMENSION IN DIASTOLE: 3.92 CM (ref 3.8–5.2)
LEFT VENTRICULAR INTERNAL DIMENSION IN SYSTOLE: 2.14 CM (ref 2.2–3.5)
LEFT VENTRICULAR MASS: 111 G
LEFT VENTRICULAR OUTFLOW TRACT MEAN GRADIENT: 3 MMHG
LEFT VENTRICULAR OUTFLOW TRACT MEAN VELOCITY: 81.6 CM/S
LEFT VENTRICULAR OUTFLOW TRACT PEAK GRADIENT: 6 MMHG
LEFT VENTRICULAR POSTERIOR WALL IN END DIASTOLE: 0.86 CM (ref 0.6–0.9)
LV STROKE VOLUME INDEX: 42.4 ML/M2
MITRAL VALVE E/A RATIO: 0.7
MV AVERAGE E/E' RATIO: 7.4 CM/S
MV DECELERATION TIME: 303 MS
MV E'TISSUE VEL-LAT: 10.5 CM/S
MV E'TISSUE VEL-MED: 6.73 CM/S
MV LATERAL E/E' RATIO: 6.1
MV MEDIAL E/E' RATIO: 9.5
MV PEAK A VELOCITY: 85.4 CM/S
MV PEAK E VELOCITY: 63.7 CM/S
NUC REST DIASTOLIC VOLUME INDEX: 3600 LBS
NUC REST SYSTOLIC VOLUME INDEX: 65 IN
TRICUSPID REGURGITATION PEAK PRESSURE GRADIENT: 26.2 MMHG
TRICUSPID VALVE ANULAR PLANE SYSTOLIC EXCURSION: 2.3 CM
TRICUSPID VALVE PEAK REGURGITANT VELOCITY: 256 CM/S

## 2021-04-13 ASSESSMENT — MIFFLIN-ST. JEOR: SCORE: 1551.47

## 2021-04-15 ENCOUNTER — OFFICE VISIT - HEALTHEAST (OUTPATIENT)
Dept: CARDIOLOGY | Facility: CLINIC | Age: 66
End: 2021-04-15

## 2021-04-15 DIAGNOSIS — I71.20 THORACIC AORTIC ANEURYSM WITHOUT RUPTURE (H): ICD-10-CM

## 2021-04-15 DIAGNOSIS — E66.01 MORBID OBESITY (H): ICD-10-CM

## 2021-04-15 DIAGNOSIS — E78.00 HYPERCHOLESTEROLEMIA: ICD-10-CM

## 2021-04-15 ASSESSMENT — MIFFLIN-ST. JEOR: SCORE: 1506.11

## 2021-06-04 VITALS
WEIGHT: 207.7 LBS | HEIGHT: 66 IN | SYSTOLIC BLOOD PRESSURE: 134 MMHG | RESPIRATION RATE: 8 BRPM | HEART RATE: 60 BPM | DIASTOLIC BLOOD PRESSURE: 100 MMHG | BODY MASS INDEX: 33.38 KG/M2

## 2021-06-04 VITALS
DIASTOLIC BLOOD PRESSURE: 90 MMHG | SYSTOLIC BLOOD PRESSURE: 160 MMHG | HEART RATE: 88 BPM | HEIGHT: 66 IN | RESPIRATION RATE: 12 BRPM | WEIGHT: 205.3 LBS | BODY MASS INDEX: 32.99 KG/M2

## 2021-06-04 VITALS — BODY MASS INDEX: 34.16 KG/M2 | HEIGHT: 65 IN | WEIGHT: 205 LBS

## 2021-06-05 VITALS — BODY MASS INDEX: 37.49 KG/M2 | WEIGHT: 225 LBS | HEIGHT: 65 IN

## 2021-06-05 VITALS
HEART RATE: 90 BPM | DIASTOLIC BLOOD PRESSURE: 92 MMHG | WEIGHT: 215 LBS | HEIGHT: 65 IN | OXYGEN SATURATION: 97 % | RESPIRATION RATE: 16 BRPM | BODY MASS INDEX: 35.82 KG/M2 | SYSTOLIC BLOOD PRESSURE: 168 MMHG

## 2021-06-10 NOTE — TELEPHONE ENCOUNTER
Wellness Screening Tool  Symptom Screening:  Do you have one of the following NEW symptoms:    Fever (subjective or >100.0)?  No    A new cough?  No    Shortness of breath?  No     Chills? No     New loss of taste or smell? No     Generalized body aches? No     New persistent headache? No     New sore throat? No     Nausea, vomiting, or diarrhea?  No    Within the past 3 weeks, have you been exposed to someone with a known positive illness below:    COVID-19 (known or suspected)?  No    Chicken pox?  No    Mealses?  No    Pertussis?  No    Patient notified of visitor policy- They may have one person accompany them to their appointment, but they will need to wear a mask and will be screened upon arrival for symptoms: Yes  Pt informed to wear a mask: Yes  Pt notified if they develop any symptoms listed above, prior to their appointment, they are to call the clinic directly at [phone] for further instructions.  Yes  Patient's appointment status: Patient will be seen in clinic as scheduled on 8/28/20

## 2021-06-10 NOTE — TELEPHONE ENCOUNTER
----- Message from Daisy Garibay sent at 8/13/2020 10:00 AM CDT -----  General phone call:    Caller: Xiomara  Primary cardiologist: Kenny  Detailed reason for call: Dosing question re Atorvastatin  New or active symptoms?   Best phone number: Pharmacist Crescencio @ [phone]- Xiomara, Luis Cruz  Best time to contact:   Ok to leave a detailed message?   Device?     Additional Info:

## 2021-06-10 NOTE — TELEPHONE ENCOUNTER
Wellness Screening Tool  Symptom Screening:  Do you have one of the following NEW symptoms:    Fever (subjective or >100.0)?  No    A new cough?  No    Shortness of breath?  No     Chills? No     New loss of taste or smell? No     Generalized body aches? No     New persistent headache? No     New sore throat? No     Nausea, vomiting, or diarrhea?  No    Within the past 3 weeks, have you been exposed to someone with a known positive illness below:    COVID-19 (known or suspected)?  No    Chicken pox?  No    Mealses?  No    Pertussis?  No    Patient notified of visitor policy- They may have one person accompany them to their appointment, but they will need to wear a mask and will be screened upon arrival for symptoms: Yes  Pt informed to wear a mask: Yes  Pt notified if they develop any symptoms listed above, prior to their appointment, they are to call the clinic directly at [phone] for further instructions.  Yes  Patient's appointment status: Patient will be seen in clinic as scheduled on 8/11

## 2021-06-10 NOTE — PATIENT INSTRUCTIONS - HE
Monitor blood pressure at home record measurements follow-up with my nurse Linda every other week.    Start atorvastatin 20 mg at night  Start lisinopril 10 mg in the morning  Start metoprolol XL 25 mg in the morning    Arrange for outpatient tests which will include contrast CT scan of the chest and abdomen to evaluate the aorta.  Cardiac echo to assess left ventricular function and aortic valve.

## 2021-06-10 NOTE — TELEPHONE ENCOUNTER
----- Message -----  From: Linda Jules RN  Sent: 8/21/2020  10:52 AM CDT  To: MD Dr. Flor Isaac,     In Dr. Cox's absence, please review results.  Any recommendations or okay to wait for PTK to review when he returns to office? -jair      ----- Message from Syed Ray MD sent at 8/21/2020 11:02 AM CDT -----  Please schedule GREG to examine AV and aorta with referral to CT surgery after. Tier 1 (obtain next week).  Talked with Sierra.     --------------------------------------------------------    Called patient and reviewed results and recommendations per Dr. Ray. Patient verbalized understanding and agreement. -ejb

## 2021-06-10 NOTE — TELEPHONE ENCOUNTER
Spoke to pharm and will start with 20 mg daily and increase as pt tolerates. Spoke to pt and she will start with 20 mg and increase after 2 weeks to 40,60 and goal of 80 mg daily. Pt will call clinic to update progress and more refills. Will watch of muscle aches and other new symptom.

## 2021-06-10 NOTE — PROGRESS NOTES
Consultation - [facility]  Maria Ines Molina,  1955, MRN [id number]    PCP: Li Gotti MD, [phone]    Assessment and Plan: Thoracic aortic aneurysm  Hypertension  Recommendations: Based on recommendations I would start her on ethical for medication we will start a statin at low dose.  Would also start antihypertensive therapy consider combination of low-dose beta-blocker and ACE inhibitor.  Obtain CT scan for entire assessment of the aorta.  Cardiac echo to assess for possible bicuspid valve.    Chief Complaint: Recent abdominal pains and defecation of thoracic aortic aneurysm  HPI:  We have been requested by Milton Gotti and Bunny to evaluate Maria Ines Molina for consultation who is a  65 y.o. year old female for above chief complaint.  Hx: 65-year-old woman who is been referred to us after being seen in the emergency room.  In July.  She presented with some abdominal pain and discomfort under her right ribs that seem to intensify during the day.  Complains of right upper quadrant abdominal pain for 3 weeks.  Seems to change with position.  Presented with hypertension with blood pressure 167/104.  A CT scan suggested a thoracic aortic aneurysm of the ascending aorta 51 x 50 mm.  Ultrasound suggest increased echogenicity in the liver consistent with steatosis 30-year history of tobacco smoking currently not active.  No family history of early onset heart disease or heart attack.    Heart Risk Calculator  10.2%  10-year risk of heart disease or stroke  On the basis of your age and calculated risk for heart disease or stroke over 10%, the USPSTF guidelines suggest you discuss starting aspirin with your doctor.  On the basis of your age and calculated risk for heart disease or stroke over 7.5%, the ACC/AHA guidelines suggest you should be on a moderate to high intensity statin.  Based on your age and race, your blood pressure is poorly-controlled, and you should initiate lifestyle interventions and  consider starting a thiazide diuretic, ACEI/ARB, or calcium channel blocker.  Demography Cholesterol Blood pressure Risk factors   Age: 65 Total: 166 Systolic: 160 Diabetes: no   Gender: female HDL: 47 Diastolic: 90 Smoking: no   Race: not African-American  On medication: no          Current Outpatient Medications:      multivitamin with minerals tablet, Take 1 tablet by mouth daily., Disp: , Rfl:   Medical History  Active Ambulatory (Non-Hospital) Problems    Diagnosis     Joint Pain, Localized In The Knee     No past medical history on file.    Surgical History  She  has no past surgical history on file.    Social History  Reviewed, and she  reports that she quit smoking about 36 years ago. Her smoking use included cigarettes. She has never used smokeless tobacco. She reports that she does not use drugs.  Smoking status reviewed.  Social history othrwise not contributory to HPI.  Allergies  No Known Allergies    Family History  Reviewed, and family history is not on file.  Extended Emergency Contact Information  Primary Emergency Contact: LEANNA HOLGUIN  [place] Phone: [phone]  Relation: Spouse  Secondary Emergency Contact: NICOLETTE HOLGUIN  [place] Phone: [phone]  Relation: Child  Family history otherwise negative or not conributory to HPI.    Psychosocial Needs  Social History     Social History Narrative     Not on file     Additional psychosocial needs reviewed per nursing assessment.    Prior to Admission Medications  (Not in a hospital admission)      Review of Systems:  Pertinent items are noted in HPI.  A 12 point comprehensive review of systems was negative except as noted.  Review of systems is negative except for HPI  Physical Exam:  Vitals:    08/11/20 1403   BP: 160/90   Pulse: 88   Resp: 12     Head and neck without focal cranial neurologic defects.  JVD not distended.  Carotid upstroke normal without bruit.  External eye exam normal without icterus.  External ear exam normal.  Neck without  cervical lymphadenopathy or thyromegaly.  Cardiac: S1-S2 distinct and regular exercise bruits or murmurs  Lungs: Clear  Abdomen with normal bowel tones.  Obese abdomen but unable to hear bruits.  Unable to feel pulse  Skin without rash, ecchymosis, lesions.  Neuromuscular tone normal.  Peripheral pulse intact and equal.  Joints without swelling or erythema.    Pertinent Labs  Lab Results: personally reviewed.   No results found for: NA, K, CL, CO2, BUN, CREATININE, GLUCOSE, CALCIUM  No results found for: CKTOTAL, CKMB, CKMBINDEX, TROPONINI  No results found for: WBC, HGB, HCT, MCV, PLT  No results found for: CHOL, TRIG, HDL, LDLDIRECT    Pertinent Radiology  Radiology Results: See Report  EKG Results: personally reviewed.  and See Report       Current Outpatient Medications:      multivitamin with minerals tablet, Take 1 tablet by mouth daily., Disp: , Rfl:

## 2021-06-11 NOTE — PROGRESS NOTES
Cardiothoracic Surgery Consult    Date of Service: 8/28/2020    REFERRING CARDIOLOGIST: Dr. Sondra Cox.    REASON FOR CONSULTATION: Ms. Molina is a 65 year-old woman with a bicuspid aortic valve with mild aortic stenosis and mild aortic regurgitation and a moderate size ascending aortic aneurysm.    HISTORY OF PRESENT ILLNESS: Ms. Molina is a 65 year-old woman who presented with abdominal pain and discomfort along the right costal margin last month. She underwent CT of the chest and was found to have a moderate size ascending aortic aneurysm of 5.1 cm diameter. She also has hypertension that is now being treated medically. She has no family history of bicuspid aortic valve, aortic dissection, or sudden death. She does endorse occasional chest pain at rest. It seems to occur when she is anxious. It does not occur with exertion.    PAST MEDICAL HISTORY:   Hypertension  Ascending aortic aneurysm  Bicuspid aortic valve  Dyslipidemia    PAST SURGICAL HISTORY:   Left ACL and medial mneniscus repair  Gluteus medius repair    ALLERGIES:   No Known Allergies       CURRENT MEDICATIONS:  Current Outpatient Medications on File Prior to Visit   Medication Sig Dispense Refill     atorvastatin (LIPITOR) 20 MG tablet Take 4 tablets (80 mg total) by mouth at bedtime. 90 tablet 5     lisinopriL (PRINIVIL,ZESTRIL) 10 MG tablet Take 1 tablet (10 mg total) by mouth daily. 90 tablet 5     metoprolol succinate (TOPROL-XL) 25 MG Take 1 tablet (25 mg total) by mouth daily. 90 tablet 5     multivitamin with minerals tablet Take 1 tablet by mouth daily.       Current Facility-Administered Medications on File Prior to Visit   Medication Dose Route Frequency Provider Last Rate Last Dose     [COMPLETED] benzocaine 20 % mouth spray (HURRICAINE; TOPEX)   Mouth/Throat Code/Trauma/Sedation Michel Wang MD   6 spray at 08/27/20 1240     [COMPLETED] fentaNYL pf injection (SUBLIMAZE)   Intravenous Code/Trauma/Sedation Pedro Verma  Michel BLOOD MD   100 mcg at 20 1240     [COMPLETED] midazolam (PF) injection (VERSED)   Intravenous Code/Trauma/Sedation Med ClMichel agarwal MD   2 mg at 20 1241     [DISCONTINUED] sodium chloride 0.9%  25 mL/hr Intravenous Continuous Syed Ray MD   Stopped at 20 1256     [DISCONTINUED] sodium chloride flush 10 mL (NS)  10 mL Intravenous Line Care Syed Ray MD             FAMILY HISTORY:   Father DM2    SOCIAL HISTORY:   Social History     Socioeconomic History     Marital status:      Spouse name: Not on file     Number of children: Not on file     Years of education: Not on file     Highest education level: Not on file   Occupational History     Not on file   Social Needs     Financial resource strain: Not on file     Food insecurity     Worry: Not on file     Inability: Not on file     Transportation needs     Medical: Not on file     Non-medical: Not on file   Tobacco Use     Smoking status: Former Smoker     Types: Cigarettes     Last attempt to quit:      Years since quittin.6     Smokeless tobacco: Never Used   Substance and Sexual Activity     Alcohol use: Not Currently     Drug use: Never     Sexual activity: Not on file   Lifestyle     Physical activity     Days per week: Not on file     Minutes per session: Not on file     Stress: Not on file   Relationships     Social connections     Talks on phone: Not on file     Gets together: Not on file     Attends Mandaen service: Not on file     Active member of club or organization: Not on file     Attends meetings of clubs or organizations: Not on file     Relationship status: Not on file     Intimate partner violence     Fear of current or ex partner: Not on file     Emotionally abused: Not on file     Physically abused: Not on file     Forced sexual activity: Not on file   Other Topics Concern     Not on file   Social History Narrative     Not on file       REVIEW OF SYSTEMS:  A complete 10  point review of systems was obtained and is negative other than the above stated complaints    PHYSICAL EXAMINATION:   Vitals: There were no vitals taken for this visit.  GENERAL: Well developed and well nourished   HEENT: Normocephalic conjunctiva anicteric and sclera clear   NECK: negative findings: no asymmetry, masses, or scars  CARDIOVASCULAR: Regular rate and rhythm, no jugulovenous distension, no lower extremity edema  RESPIRATIONS: no tachypnea, retractions or cyanosis  ABDOMEN: nondistended  EXTREMITIES:No edema and no deformity or swelling   NEUROLOGIC: intact and symmetric with no focal deficits.   PSYCHIATRIC: alert and oriented x3, pleasant       LABORATORY STUDIES:   No results found for: WBC, HGB, HCT, MCV, PLT No results found for: CREATININE, BUN, NA, K, CL, CO2  No results found for: HGBA1C  EKG:  Sinus rhythm   Normal ECG   No previous ECGs available     CTA CHEST/ABDOMEN/PELVIS: This study was personally reviewed by me  1.  Aortic valve thickening and probable bicuspid valve. Suspect aortic stenosis. Correlation with echocardiography is suggested.  2.  Fusiform aneurysmal enlargement of the ascending aorta measuring 4.9 x 5.0 cm. The remainder of the arch, descending thoracic aorta, and abdominal aorta are normal caliber.    TRANSTHORACIC ECHOCARDIOGRAM: This study was personally reviewed by me    Left ventricle ejection fraction is normal. The estimated left ventricular ejection fraction is 60%.    Normal right ventricular size and systolic function.    Aortic Valve: The valve is congenitally bicuspid. 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 Mild aortic stenosis. Mild aortic regurgitation.    The aortic root is moderately dilated. The ascending aorta is severely dilated 5.0cm.    IMPRESSION AND PLAN: Ms. Molina is a 65 year-old woman with a bicuspid aortic valve with mild aortic stenosis and mild aortic regurgitation and a moderate size ascending aortic aneurysm. She also has  atypical chest pain. I discussed the pathology and natural history of ascending aortic aneurysm and bicuspid aortic valve. I recommend surveillance with a repeat CTA chest/abdomen/pelvis and repeat echocardiogram in 6 months. She is already on an ace inhibitor started by Dr. Cox. He also prescribed a beta blocker and statin, and she is strongly considering starting this medications which I also endorsed.     She understands that the current ACC/AHA recommendation is to replace the ascending aorta if it is over 4.5 cm in patients with a bicuspid aortic valve at the time of aortic valve replacement. However, she does not have an indication for aortic valve replacement at this time. Therefore, I think it is reasonable to follow this with CT and echo for the time being.     I am also going to refer her to Dr. Mi Beverly who is an expert on aortic diseases for possible genetic testing.       Thank you very much for this referral.       Alfredo Morales 8/28/2020 12:13 PM

## 2021-06-11 NOTE — TELEPHONE ENCOUNTER
Called pt and explained the reason for Dr. Morales ordering a stress test. Pt has stated her chest pain has decreased in frequency since meeting with Dr. Morales and she believes it is stress related. Expressed understanding, contact info confirmed, addressed all questions.    ----- Message from Alfredo Morales MD sent at 9/1/2020  7:45 AM CDT -----  Sierra,    I know it was a little disconcerting. Since her chest pain did not occur with exertion I was not too worried, but it can fool you sometimes. Maybe start with a stress test. Why don't you call her back and ask her about the pain. If she is still having it from time to time, let's get a nuclear medicine stress test. If it is positive we will get a cardiac cath.    Thank you!  Sunil  ----- Message -----  From: Anette Coleman RN  Sent: 8/31/2020  11:31 AM CDT  To: Alfredo Morales MD    I was just thinking on her - would it be worth anything to have her do an angiogram? Or do you think her follow up in 6 months is sufficient?    I know she's pretty anxious and I meant to ask you if a cath would be worth anything if she has CAD - then maybe she would need surgery sooner than later..?    Just a thought!  Sierra

## 2021-06-13 NOTE — TELEPHONE ENCOUNTER
Imaging ordered at [facility], left message for patient to call to review plan going forward.    ----- Message from Alfredo Morales MD sent at 11/12/2020  7:42 PM CST -----  Regarding: RE: Nuc stress test is scheduled for 11/5/20 @ [facility] that would be great.    Thank you  Sunil  ----- Message -----  From: Anette Coleman RN  Sent: 11/11/2020   7:56 AM CST  To: Alfredo Morales MD  Subject: FW: Nuc stress test is scheduled for 11/5/20#    She had her stress test and looks like all is well. I will call her, and we'll stick to your plan of repeating CTA and echo in 6 months. I imagine at the ?    Sierra  ----- Message -----  From: Anette Coleman RN  Sent: 11/11/2020  To: Anette Coleman RN  Subject: FW: Nuc stress test is scheduled for 11/5/20#      ----- Message -----  From: Anette Coleman RN  Sent: 11/6/2020   2:20 PM CST  To: Anette Coleman RN  Subject: FW: Nuc stress test is scheduled for 11/5/20#    Stress test 11/10 - [facility]  ----- Message -----  From: Anette Coleman RN  Sent: 11/6/2020  To: Anette Coleman RN  Subject: FW: Nuc stress test is scheduled for 11/5/20#    R/S Nuc stress to 11/5 - [facility]  ----- Message -----  From: Anette Coleman RN  Sent: 9/30/2020  To: Anette Coleman RN  Subject: FW: Nuc stress test is scheduled for 9/29/20#      ----- Message -----  From: Josette Stiles  Sent: 9/1/2020  12:09 PM CDT  To: Anette Coleman RN  Subject: Nuc stress test is scheduled for 9/29/20 12:#    Prep brochure w/appt info is going out to her in the mail today.

## 2021-06-14 NOTE — TELEPHONE ENCOUNTER
----- Message -----  From: Naren Cox MD  Sent: 1/25/2021   4:04 PM CST  To: Linda Jules RN    Suggest trial of losartn 25 and stop lisinopril      Reviewed with patient; understanding and agreement verbalized.   Med list updated. -ejb

## 2021-06-14 NOTE — TELEPHONE ENCOUNTER
Dr. Cox, please review update below from patient. Any new recommendations? -ejb  ----------------------------------------------------------    Patient called to report dry cough r/t Lisinopril. BP well controlled, last reading 123/74, on Lisinopril but she cannot tolerate the dry cough.     ----- Message from Liyah Steel sent at 1/21/2021  1:50 PM CST -----      Caller: Patient  Primary cardiologist: Dr. Cox    Detailed reason for call: Patient was wondering if she could switch to something else instead of Lisinopril. She stated that she has a continuous cough on the lisinopril please advise.    Best phone number: [phone]  Best time to contact: today  Ok to leave a detailed message? yes  Device? No    Additional Info:

## 2021-06-16 NOTE — PROGRESS NOTES
Cardiac surgery    Ms. Molina is a 65 year-old woman with a bicuspid aortic valve with mild aortic stenosis and mild aortic regurgitation and a moderate size ascending aortic aneurysm. She also has atypical chest pain and she had another episode of shoulder and chest pain recently. She presented to the [place] ER and evaluation was unrevealing. Repeat CTA chest was unchanged.    I recommend surveillance with a repeat CTA chest/abdomen/pelvis and repeat echocardiogram in 12 months. She is already on an ace inhibitor started by Dr. Cox. He also prescribed a beta blocker and statin, and she is strongly considering starting this medications which I also endorsed.      She understands that the current ACC/AHA recommendation is to replace the ascending aorta if it is over 4.5 cm in patients with a bicuspid aortic valve at the time of aortic valve replacement. However, she does not have an indication for aortic valve replacement at this time. Therefore, I think it is reasonable to follow this with CT and echo for the time being.      Alfredo Morales MD PhD

## 2021-06-16 NOTE — TELEPHONE ENCOUNTER
Called patient after receiving a message she has had left shoulder and neck pain on and off for the last week. Patient describes this happening at rest or while she's active. States her blood pressure has been stable and normal around 120/80. Also complaining of jaw pain on and off for the last week. Patient states she thinks it is due to some nerve damage in her neck, and is unsure if she needs to be seen.     Advised patient this is a highly urgent/emergent situation and she should go to her nearest ER as soon as possible. Patient verbalized understanding and agreed to do so. Dr. Cox's office notified.

## 2021-06-29 NOTE — PROGRESS NOTES
Progress Notes by Linda Jules RN at 8/25/2020  1:27 PM     Author: Linda Jules RN Service: -- Author Type: Registered Nurse    Filed: 8/25/2020  1:28 PM Encounter Date: 8/25/2020 Status: Signed    : Linda Jules RN (Registered Nurse)

## 2021-07-03 NOTE — ADDENDUM NOTE
Addendum Note by Mindy Pulliam CMA at 8/11/2020  1:50 PM     Author: Mindy Pulliam CMA Service: -- Author Type: Certified Medical Assistant    Filed: 8/11/2020  3:09 PM Encounter Date: 8/11/2020 Status: Signed    : Mindy Pulliam CMA (Certified Medical Assistant)    Addended by: MINDY PULLIAM on: 8/11/2020 03:09 PM        Modules accepted: Orders

## 2021-07-04 NOTE — ADDENDUM NOTE
Addendum Note by Anette Coleman RN at 4/15/2021  3:30 PM     Author: Anette Coleman RN Service: -- Author Type: Registered Nurse    Filed: 4/16/2021  8:19 AM Encounter Date: 4/15/2021 Status: Signed    : Anette Coleman RN (Registered Nurse)    Addended by: ANETTE COLEMAN on: 4/16/2021 08:19 AM        Modules accepted: Orders

## 2021-08-28 ENCOUNTER — HEALTH MAINTENANCE LETTER (OUTPATIENT)
Age: 66
End: 2021-08-28

## 2021-10-23 ENCOUNTER — HEALTH MAINTENANCE LETTER (OUTPATIENT)
Age: 66
End: 2021-10-23

## 2021-10-26 ENCOUNTER — TRANSFERRED RECORDS (OUTPATIENT)
Dept: HEALTH INFORMATION MANAGEMENT | Facility: OTHER | Age: 66
End: 2021-10-26

## 2022-02-14 NOTE — PROGRESS NOTES
Chief Complaint - bilateral itchy ears    History of Present Illness - Maria Ines Molina is a 67 year old female who presents to me today with ear plugging, itchy, pain, and drainage. She has crusting. Right now right is worse, but a lot of the time the left is worse. It has been present and noticeable for approximately a few months. She has tried alcohol, it helps a little, but then comes back. She is a swimmer. There is no history of chronic ear disease or ear surgery. I saw her in 2017 for sinusitis and likely ETD at that time. She has a sore throat some. She had covid in October.     Past Medical History -   Patient Active Problem List   Diagnosis     Knee osteoarthritis     Obesity     Special screening for malignant neoplasms, colon       Current Medications -   Current Outpatient Medications:      lisinopril (ZESTRIL) 10 MG tablet, TAKE 1 TABLET(10 MG) BY MOUTH DAILY, Disp: 90 tablet, Rfl: 1    Allergies - No Known Allergies    Social History -   Social History     Socioeconomic History     Marital status:      Spouse name: Not on file     Number of children: Not on file     Years of education: Not on file     Highest education level: Not on file   Occupational History     Not on file   Tobacco Use     Smoking status: Former Smoker     Types: Cigarettes, Cigarettes     Quit date: 1984     Years since quittin.1     Smokeless tobacco: Never Used   Substance and Sexual Activity     Alcohol use: Not Currently     Drug use: Never     Sexual activity: Not on file   Other Topics Concern     Not on file   Social History Narrative     Not on file     Social Determinants of Health     Financial Resource Strain: Not on file   Food Insecurity: Not on file   Transportation Needs: Not on file   Physical Activity: Not on file   Stress: Not on file   Social Connections: Not on file   Intimate Partner Violence: Not on file   Housing Stability: Not on file       Physical Exam  BP (!) 161/101   Pulse 81   Resp 18    SpO2 98%   General - The patient is nontoxic, in no distress.  Alert and oriented to person and place, answers questions and cooperates with examination appropriately.   Voice and Breathing - The patient was breathing comfortably without the use of accessory muscles. There was no wheezing, stridor, or stertor.  The patients voice was clear and strong.  Ears -both ear canals have dry flaky lateral ear canal skin.  There is some erythema with skin cracking in the right lateral canal and meatus.  I see no significant buildup of debris including no fungus and no purulence.  This seems consistent with eczematous otitis externa.  Both tympanic membranes intact.  No middle ear effusion no acute infection.  Mouth - Examination of the oral cavity showed pink, healthy mucosa. No lesions or ulcerations noted.  The tongue was mobile and midline.  Throat - The walls of the oropharynx were smooth, symmetric, and had no lesions or ulcerations.  The uvula was midline on elevation.    Neck - Soft, non-tender. Palpation of the occipital, submental, submandibular, internal jugular chain, and supraclavicular nodes did not demonstrate any abnormal lymph nodes or masses. No parotid masses. Palpation of the thyroid was soft and smooth, with no nodules or goiter appreciated.  The trachea was mobile and midline.  Neurological - Cranial nerves 2 through 12 were grossly intact. House-Brackmann grade 1 out of 6 bilaterally.      Assessment and Plan - Maria Ines Molina is a 67 year old female who presents to me today with a few months of bilateral ear itching, cracking and dryness.  No changes to her hearing.  Her exam is most consistent with chronic eczematous otitis externa both ears.  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  I advised her against doing this I think it is making the problem worse as it dries out the ear too much.  I want her to use Derm otic, hydrocortisone cream 1% to the external part that she can reach, and she  can consider using head and shoulders or Selsun Blue shampoo with shampooing the external meatus.  If she develops infection with purulent drainage or thick white or black debris that could suggest infection and then she should notify me.  Return as needed.    Zia Aguayo MD  Otolaryngology  [facility]

## 2022-02-15 ENCOUNTER — OFFICE VISIT (OUTPATIENT)
Dept: OTOLARYNGOLOGY | Facility: CLINIC | Age: 67
End: 2022-02-15
Payer: MEDICARE

## 2022-02-15 VITALS
DIASTOLIC BLOOD PRESSURE: 101 MMHG | SYSTOLIC BLOOD PRESSURE: 161 MMHG | RESPIRATION RATE: 18 BRPM | HEART RATE: 81 BPM | OXYGEN SATURATION: 98 %

## 2022-02-15 DIAGNOSIS — H60.8X3 CHRONIC ECZEMATOUS OTITIS EXTERNA OF BOTH EARS: Primary | ICD-10-CM

## 2022-02-15 PROCEDURE — 99203 OFFICE O/P NEW LOW 30 MIN: CPT | Performed by: OTOLARYNGOLOGY

## 2022-02-15 RX ORDER — FLUOCINOLONE ACETONIDE 0.11 MG/ML
OIL AURICULAR (OTIC)
Qty: 20 ML | Refills: 1 | Status: SHIPPED | OUTPATIENT
Start: 2022-02-15 | End: 2022-09-27

## 2022-02-15 NOTE — LETTER
2/15/2022         RE: Maria Ines Molina  [id number] [address] [id number]        Dear Colleague,    Thank you for referring your patient, Maria Ines Molina, to the [facility]. Please see a copy of my visit note below.    Chief Complaint - bilateral itchy ears    History of Present Illness - Maria Ines Molina is a 67 year old female who presents to me today with ear plugging, itchy, pain, and drainage. She has crusting. Right now right is worse, but a lot of the time the left is worse. It has been present and noticeable for approximately a few months. She has tried alcohol, it helps a little, but then comes back. She is a swimmer. There is no history of chronic ear disease or ear surgery. I saw her in 2017 for sinusitis and likely ETD at that time. She has a sore throat some. She had covid in October.     Past Medical History -   Patient Active Problem List   Diagnosis     Knee osteoarthritis     Obesity     Special screening for malignant neoplasms, colon       Current Medications -   Current Outpatient Medications:      lisinopril (ZESTRIL) 10 MG tablet, TAKE 1 TABLET(10 MG) BY MOUTH DAILY, Disp: 90 tablet, Rfl: 1    Allergies - No Known Allergies    Social History -   Social History     Socioeconomic History     Marital status:      Spouse name: Not on file     Number of children: Not on file     Years of education: Not on file     Highest education level: Not on file   Occupational History     Not on file   Tobacco Use     Smoking status: Former Smoker     Types: Cigarettes, Cigarettes     Quit date: 1984     Years since quittin.1     Smokeless tobacco: Never Used   Substance and Sexual Activity     Alcohol use: Not Currently     Drug use: Never     Sexual activity: Not on file   Other Topics Concern     Not on file   Social History Narrative     Not on file     Social Determinants of Health     Financial Resource Strain: Not on file   Food Insecurity: Not on file    Transportation Needs: Not on file   Physical Activity: Not on file   Stress: Not on file   Social Connections: Not on file   Intimate Partner Violence: Not on file   Housing Stability: Not on file       Physical Exam  BP (!) 161/101   Pulse 81   Resp 18   SpO2 98%   General - The patient is nontoxic, in no distress.  Alert and oriented to person and place, answers questions and cooperates with examination appropriately.   Voice and Breathing - The patient was breathing comfortably without the use of accessory muscles. There was no wheezing, stridor, or stertor.  The patients voice was clear and strong.  Ears -both ear canals have dry flaky lateral ear canal skin.  There is some erythema with skin cracking in the right lateral canal and meatus.  I see no significant buildup of debris including no fungus and no purulence.  This seems consistent with eczematous otitis externa.  Both tympanic membranes intact.  No middle ear effusion no acute infection.  Mouth - Examination of the oral cavity showed pink, healthy mucosa. No lesions or ulcerations noted.  The tongue was mobile and midline.  Throat - The walls of the oropharynx were smooth, symmetric, and had no lesions or ulcerations.  The uvula was midline on elevation.    Neck - Soft, non-tender. Palpation of the occipital, submental, submandibular, internal jugular chain, and supraclavicular nodes did not demonstrate any abnormal lymph nodes or masses. No parotid masses. Palpation of the thyroid was soft and smooth, with no nodules or goiter appreciated.  The trachea was mobile and midline.  Neurological - Cranial nerves 2 through 12 were grossly intact. House-Brackmann grade 1 out of 6 bilaterally.      Assessment and Plan - Maria Ines Molina is a 67 year old female who presents to me today with a few months of bilateral ear itching, cracking and dryness.  No changes to her hearing.  Her exam is most consistent with chronic eczematous otitis externa both ears.  She  is a swimmer and has been using some alcohol.  I advised her against doing this I think it is making the problem worse as it dries out the ear too much.  I want her to use Derm otic, hydrocortisone cream 1% to the external part that she can reach, and she can consider using head and shoulders or Selsun Blue shampoo with shampooing the external meatus.  If she develops infection with purulent drainage or thick white or black debris that could suggest infection and then she should notify me.  Return as needed.    Zia Aguayo MD  Otolaryngology  [facility]        Again, thank you for allowing me to participate in the care of your patient.        Sincerely,        Zia Aguayo MD

## 2022-02-15 NOTE — PATIENT INSTRUCTIONS
- Use dermotic drops as prescribed  - Okay to apply hydrocortisone 1% cream to the outside parts of the ear  - can try a selsun blue or head and shoulders shampoo.

## 2022-02-22 DIAGNOSIS — I35.9 AORTIC VALVE DISEASE: Primary | ICD-10-CM

## 2022-06-06 ENCOUNTER — HOSPITAL ENCOUNTER (OUTPATIENT)
Dept: CT IMAGING | Facility: HOSPITAL | Age: 67
Discharge: HOME | End: 2022-06-06
Attending: SURGERY | Admitting: SURGERY
Payer: MEDICARE

## 2022-06-06 DIAGNOSIS — I71.20 THORACIC AORTIC ANEURYSM WITHOUT RUPTURE (H): ICD-10-CM

## 2022-06-06 LAB
CREAT BLD-MCNC: 0.8 MG/DL (ref 0.6–1.1)
GFR SERPL CREATININE-BSD FRML MDRD: >60 ML/MIN/1.73M2

## 2022-06-06 PROCEDURE — 250N000011 HC RX IP 250 OP 636: Performed by: SURGERY

## 2022-06-06 PROCEDURE — 74174 CTA ABD&PLVS W/CONTRAST: CPT | Mod: MG

## 2022-06-06 PROCEDURE — 71275 CT ANGIOGRAPHY CHEST: CPT | Mod: MG

## 2022-06-06 PROCEDURE — 82565 ASSAY OF CREATININE: CPT

## 2022-06-06 RX ORDER — IOPAMIDOL 755 MG/ML
100 INJECTION, SOLUTION INTRAVASCULAR ONCE
Status: COMPLETED | OUTPATIENT
Start: 2022-06-06 | End: 2022-06-06

## 2022-06-06 RX ADMIN — IOPAMIDOL 100 ML: 755 INJECTION, SOLUTION INTRAVENOUS at 11:22

## 2022-09-15 DIAGNOSIS — I35.9 AORTIC VALVE DISEASE: Primary | ICD-10-CM

## 2022-09-27 ENCOUNTER — HOSPITAL ENCOUNTER (OUTPATIENT)
Dept: CARDIOLOGY | Facility: CLINIC | Age: 67
Discharge: HOME | End: 2022-09-27
Attending: SURGERY | Admitting: SURGERY
Payer: MEDICARE

## 2022-09-27 ENCOUNTER — OFFICE VISIT (OUTPATIENT)
Dept: CARDIOLOGY | Facility: CLINIC | Age: 67
End: 2022-09-27
Payer: MEDICARE

## 2022-09-27 VITALS
WEIGHT: 211 LBS | SYSTOLIC BLOOD PRESSURE: 138 MMHG | DIASTOLIC BLOOD PRESSURE: 98 MMHG | RESPIRATION RATE: 16 BRPM | HEART RATE: 95 BPM | BODY MASS INDEX: 35.11 KG/M2

## 2022-09-27 DIAGNOSIS — I71.21 ASCENDING AORTIC ANEURYSM (H): Primary | ICD-10-CM

## 2022-09-27 DIAGNOSIS — I35.9 AORTIC VALVE DISEASE: ICD-10-CM

## 2022-09-27 LAB — LVEF ECHO: NORMAL

## 2022-09-27 PROCEDURE — 93306 TTE W/DOPPLER COMPLETE: CPT | Mod: 26 | Performed by: INTERNAL MEDICINE

## 2022-09-27 PROCEDURE — 99213 OFFICE O/P EST LOW 20 MIN: CPT | Performed by: SURGERY

## 2022-09-27 PROCEDURE — 93306 TTE W/DOPPLER COMPLETE: CPT

## 2022-09-27 RX ORDER — MULTIPLE VITAMINS W/ MINERALS TAB 9MG-400MCG
1 TAB ORAL DAILY
COMMUNITY

## 2022-09-27 NOTE — LETTER
9/27/2022    Li Gotti MD  [address]  [place] [address]    RE: Maria Ines Molina       Dear Colleague,     I had the pleasure of seeing Maria Ines Molina in the [facility].  Cardiac surgery     Ms. Molina is a 67 year-old woman with a bicuspid aortic valve with mild aortic stenosis and mild aortic regurgitation and a moderate size ascending aortic aneurysm. The ascending aortic aneurysm has been followed for several years now, and it has been more or less stable.    She had a recent repeat CTA chest/abdomen/pelvis in June 2022 and repeat echocardiogram today. The ascending aortic diameter remains stable at around 5 cm and the echocardiogram shows no change in her mild aortic stenosis and mild aortic regurgitation.    Again today, we reviewed that the current ACC/AHA recommendation is to replace the ascending aorta if it is over 4.5 cm in patients with a bicuspid aortic valve at the time of aortic valve replacement. However, as her mean aortic valve gradient remains stable around 20 mmHg, she does not have an indication for aortic valve replacement at this time. Therefore, I think it is reasonable to follow this with CT and echo in another year. I emphasized that she should not lift more than 40 pounds (and maybe even 20 pounds is safer), and she should make sure that her blood pressure is well controlled.       Alfredo Morales MD PhD     Thank you for allowing me to participate in the care of your patient.      Sincerely,     Alfredo Morales MD   [facility]  cc: No referring provider defined for this encounter.

## 2022-09-27 NOTE — PROGRESS NOTES
Cardiac surgery     Ms. Molina is a 67 year-old woman with a bicuspid aortic valve with mild aortic stenosis and mild aortic regurgitation and a moderate size ascending aortic aneurysm. The ascending aortic aneurysm has been followed for several years now, and it has been more or less stable.    She had a recent repeat CTA chest/abdomen/pelvis in June 2022 and repeat echocardiogram today. The ascending aortic diameter remains stable at around 5 cm and the echocardiogram shows no change in her mild aortic stenosis and mild aortic regurgitation.    Again today, we reviewed that the current ACC/AHA recommendation is to replace the ascending aorta if it is over 4.5 cm in patients with a bicuspid aortic valve at the time of aortic valve replacement. However, as her mean aortic valve gradient remains stable around 20 mmHg, she does not have an indication for aortic valve replacement at this time. Therefore, I think it is reasonable to follow this with CT and echo in another year. I emphasized that she should not lift more than 40 pounds (and maybe even 20 pounds is safer), and she should make sure that her blood pressure is well controlled.       Alfredo Morales MD PhD

## 2022-10-09 ENCOUNTER — HEALTH MAINTENANCE LETTER (OUTPATIENT)
Age: 67
End: 2022-10-09

## 2023-09-05 DIAGNOSIS — I71.21 ANEURYSM OF ASCENDING AORTA WITHOUT RUPTURE (H): ICD-10-CM

## 2023-09-05 DIAGNOSIS — I35.9 AORTIC VALVE DISEASE: Primary | ICD-10-CM

## 2023-10-28 ENCOUNTER — HEALTH MAINTENANCE LETTER (OUTPATIENT)
Age: 68
End: 2023-10-28

## 2023-10-31 ENCOUNTER — TELEPHONE (OUTPATIENT)
Dept: CARDIOLOGY | Facility: CLINIC | Age: 68
End: 2023-10-31
Payer: MEDICARE

## 2023-11-01 ENCOUNTER — HOSPITAL ENCOUNTER (OUTPATIENT)
Dept: CT IMAGING | Facility: CLINIC | Age: 68
Discharge: HOME | End: 2023-11-01
Attending: SURGERY
Payer: MEDICARE

## 2023-11-01 ENCOUNTER — HOSPITAL ENCOUNTER (OUTPATIENT)
Dept: CARDIOLOGY | Facility: CLINIC | Age: 68
Discharge: HOME | End: 2023-11-01
Attending: SURGERY
Payer: MEDICARE

## 2023-11-01 DIAGNOSIS — I35.9 AORTIC VALVE DISEASE: ICD-10-CM

## 2023-11-01 DIAGNOSIS — I71.21 ANEURYSM OF ASCENDING AORTA WITHOUT RUPTURE (H): ICD-10-CM

## 2023-11-01 LAB
CREAT BLD-MCNC: 0.8 MG/DL (ref 0.6–1.1)
EGFRCR SERPLBLD CKD-EPI 2021: >60 ML/MIN/1.73M2

## 2023-11-01 PROCEDURE — 250N000011 HC RX IP 250 OP 636: Mod: JZ | Performed by: SURGERY

## 2023-11-01 PROCEDURE — 93306 TTE W/DOPPLER COMPLETE: CPT

## 2023-11-01 PROCEDURE — G1010 CDSM STANSON: HCPCS

## 2023-11-01 PROCEDURE — 82565 ASSAY OF CREATININE: CPT

## 2023-11-01 PROCEDURE — 93306 TTE W/DOPPLER COMPLETE: CPT | Mod: 26 | Performed by: INTERNAL MEDICINE

## 2023-11-01 RX ORDER — IOPAMIDOL 755 MG/ML
100 INJECTION, SOLUTION INTRAVASCULAR ONCE
Status: COMPLETED | OUTPATIENT
Start: 2023-11-01 | End: 2023-11-01

## 2023-11-01 RX ADMIN — IOPAMIDOL 90 ML: 755 INJECTION, SOLUTION INTRAVENOUS at 11:55

## 2023-12-05 ENCOUNTER — OFFICE VISIT (OUTPATIENT)
Dept: CARDIOLOGY | Facility: CLINIC | Age: 68
End: 2023-12-05
Attending: SURGERY
Payer: MEDICARE

## 2023-12-05 VITALS
HEART RATE: 103 BPM | DIASTOLIC BLOOD PRESSURE: 96 MMHG | RESPIRATION RATE: 18 BRPM | BODY MASS INDEX: 35.94 KG/M2 | SYSTOLIC BLOOD PRESSURE: 142 MMHG | WEIGHT: 216 LBS | OXYGEN SATURATION: 96 %

## 2023-12-05 DIAGNOSIS — I35.9 AORTIC VALVE DISEASE: ICD-10-CM

## 2023-12-05 DIAGNOSIS — I71.21 ANEURYSM OF ASCENDING AORTA WITHOUT RUPTURE (H): ICD-10-CM

## 2023-12-05 PROCEDURE — 99213 OFFICE O/P EST LOW 20 MIN: CPT | Performed by: SURGERY

## 2023-12-05 NOTE — PATIENT INSTRUCTIONS
You were seen today in the [facility]    Call Anette whenever you're ready to schedule surgery, and we will get preop testing and angiogram arranged when you're ready.    Please call ADY Cheema surgery coordinator with any questions.  Thank you.    Anette Lazo RNCC  Cardiovascular Surgery  Phone [phone]  Fax [phone]

## 2023-12-05 NOTE — PROGRESS NOTES
Cardiac surgery     Ms. Molina is a 67 year-old woman with a bicuspid aortic valve with known aortic stenosis that was previously mild, as well as mild aortic regurgitation and an ascending aortic aneurysm measuring 5 cm. The ascending aortic aneurysm has been followed for several years now, and it has been more or less stable.     She had a recent repeat CTA chest/abdomen/pelvis in November 2023, in addition to a repeat echocardiogram, and while the diameter of the ascending aneurysm is stable, her aortic stenosis has worsened. Her previous mean aortic gradient was 21.4 mmHg in September 2022, and in November 2023 it had increased to 32 mmHg (moderate-severe).      Overall, she still does not endorse any symptoms such as dyspnea, chest pain or syncope/presyncope.    Again today, we reviewed that the current ACC/AHA recommendation is to replace the ascending aorta if it is over 4.5 cm in patients with a bicuspid aortic valve at the time of aortic valve replacement. Now that her mean aortic valve gradient is increasing and her aortic stenosis is considered moderate-severe, I think it is reasonable to proceed with surgery whenever she is comfortable and resolved with this. She is understandably quite saddened to have this problem, and she would obviously rather avoid surgery if she can. We had a long conversation about the timing of surgery. She understands that although it may be safe to watch for several months or a year, she may develop worsening aortic stenosis and heart failure or syncope at an inconvenient time in the coming year, and she will have to have surgery done more urgently if this occurs.    I also discussed the option of a bioprosthetic versus a mechanical valve with the patient. She understands that a bioprosthetic valve would not require lifelong anticoagulation, but there is a risk of prosthetic valve degeneration. I also explained that while a mechanical valve has unlimited durability, this would  require lifelong anticoagulation with Coumadin. There is also a small risk of hearing the valve click. The risk of infection is equal between the two. In her case, at age 68, I think it would be most prudent to choose a bioprosthetic aortic valve.    She is going to consider all of this and let us know when she would like to have surgery.    11/2023 CT ANGIOGRAM CHEST, ABDOMEN, AND PELVIS: The ascending thoracic aorta is stable in size measuring 4.9 cm being dilated/aneurysmal. The arch and descending thoracic aorta appear unremarkable. Great vessels are widely patent with incidental arch origin of  the left vertebral artery. The abdominal aorta appears unremarkable. Renal and mesenteric vasculature is widely patent.  Iliac and proximal femoral vasculature is unremarkable.    TTE 11/2023  1. Normal left ventricular size and systolic performance with a visually  estimated ejection fraction of 65%.  2. The aortic valve is suspected to be bicuspid though cannot be fully  confirmed.  3. There is moderate to severe aortic stenosis.  Â  The mean gradient is measured at 32 mmHg with peak velocity of 3.6 m/s.  Calculated valve area 0.81-0.88 cmÂ . Dimensionless index 0.25 (stroke-volume  index measured at 32.3 mL/mÂ ).  4. There is mild aortic insufficiency.  5. Normal right ventricular size and systolic performance.  6. There is moderate to severe enlargement of the aortic root/proximal  ascending aorta (5.0 cm).        Alfredo Morales MD PhD

## 2023-12-05 NOTE — LETTER
12/5/2023    Physician No Ref-Primary  No address on file    RE: Maria Ines Wetzelx       Dear Colleague,     I had the pleasure of seeing Maria Ines Molina in the [facility].  Cardiac surgery     Ms. Molina is a 67 year-old woman with a bicuspid aortic valve with known aortic stenosis that was previously mild, as well as mild aortic regurgitation and an ascending aortic aneurysm measuring 5 cm. The ascending aortic aneurysm has been followed for several years now, and it has been more or less stable.     She had a recent repeat CTA chest/abdomen/pelvis in November 2023, in addition to a repeat echocardiogram, and while the diameter of the ascending aneurysm is stable, her aortic stenosis has worsened. Her previous mean aortic gradient was 21.4 mmHg in September 2022, and in November 2023 it had increased to 32 mmHg (moderate-severe).      Overall, she still does not endorse any symptoms such as dyspnea, chest pain or syncope/presyncope.    Again today, we reviewed that the current ACC/AHA recommendation is to replace the ascending aorta if it is over 4.5 cm in patients with a bicuspid aortic valve at the time of aortic valve replacement. Now that her mean aortic valve gradient is increasing and her aortic stenosis is considered moderate-severe, I think it is reasonable to proceed with surgery whenever she is comfortable and resolved with this. She is understandably quite saddened to have this problem, and she would obviously rather avoid surgery if she can. We had a long conversation about the timing of surgery. She understands that although it may be safe to watch for several months or a year, she may develop worsening aortic stenosis and heart failure or syncope at an inconvenient time in the coming year, and she will have to have surgery done more urgently if this occurs.    I also discussed the option of a bioprosthetic versus a mechanical valve with the patient. She understands that a  bioprosthetic valve would not require lifelong anticoagulation, but there is a risk of prosthetic valve degeneration. I also explained that while a mechanical valve has unlimited durability, this would require lifelong anticoagulation with Coumadin. There is also a small risk of hearing the valve click. The risk of infection is equal between the two. In her case, at age 68, I think it would be most prudent to choose a bioprosthetic aortic valve.    She is going to consider all of this and let us know when she would like to have surgery.    11/2023 CT ANGIOGRAM CHEST, ABDOMEN, AND PELVIS: The ascending thoracic aorta is stable in size measuring 4.9 cm being dilated/aneurysmal. The arch and descending thoracic aorta appear unremarkable. Great vessels are widely patent with incidental arch origin of  the left vertebral artery. The abdominal aorta appears unremarkable. Renal and mesenteric vasculature is widely patent.  Iliac and proximal femoral vasculature is unremarkable.    TTE 11/2023  1. Normal left ventricular size and systolic performance with a visually  estimated ejection fraction of 65%.  2. The aortic valve is suspected to be bicuspid though cannot be fully  confirmed.  3. There is moderate to severe aortic stenosis.  Â  The mean gradient is measured at 32 mmHg with peak velocity of 3.6 m/s.  Calculated valve area 0.81-0.88 cmÂ . Dimensionless index 0.25 (stroke-volume  index measured at 32.3 mL/mÂ ).  4. There is mild aortic insufficiency.  5. Normal right ventricular size and systolic performance.  6. There is moderate to severe enlargement of the aortic root/proximal  ascending aorta (5.0 cm).        Alfredo Morales MD PhD      Thank you for allowing me to participate in the care of your patient.      Sincerely,     Alfredo Morales MD     [facility]

## 2023-12-06 ENCOUNTER — TELEPHONE (OUTPATIENT)
Dept: CARDIOLOGY | Facility: CLINIC | Age: 68
End: 2023-12-06
Payer: MEDICARE

## 2023-12-06 NOTE — TELEPHONE ENCOUNTER
Patient called in and wanted to pick a date for surgery but would like to wait until March 2024.    PAT Monday, 3/4/24  Surgery Tuesday, 3/5/24    Patient knows she will need to see her primary physician for an H&P within 30 days of surgery. Patient takes a trip in January and returns March 1.

## 2023-12-11 ENCOUNTER — APPOINTMENT (OUTPATIENT)
Dept: RADIOLOGY | Facility: HOSPITAL | Age: 68
End: 2023-12-11
Attending: EMERGENCY MEDICINE
Payer: MEDICARE

## 2023-12-11 ENCOUNTER — APPOINTMENT (OUTPATIENT)
Dept: CT IMAGING | Facility: HOSPITAL | Age: 68
End: 2023-12-11
Attending: EMERGENCY MEDICINE
Payer: MEDICARE

## 2023-12-11 ENCOUNTER — HOSPITAL ENCOUNTER (EMERGENCY)
Facility: HOSPITAL | Age: 68
Discharge: HOME | End: 2023-12-11
Attending: EMERGENCY MEDICINE | Admitting: EMERGENCY MEDICINE
Payer: MEDICARE

## 2023-12-11 VITALS
HEIGHT: 65 IN | HEART RATE: 77 BPM | RESPIRATION RATE: 16 BRPM | SYSTOLIC BLOOD PRESSURE: 129 MMHG | WEIGHT: 215 LBS | OXYGEN SATURATION: 98 % | TEMPERATURE: 96.6 F | DIASTOLIC BLOOD PRESSURE: 84 MMHG | BODY MASS INDEX: 35.82 KG/M2

## 2023-12-11 DIAGNOSIS — R07.9 CHEST PAIN, UNSPECIFIED TYPE: ICD-10-CM

## 2023-12-11 DIAGNOSIS — R10.9 ABDOMINAL PAIN, UNSPECIFIED ABDOMINAL LOCATION: ICD-10-CM

## 2023-12-11 LAB
ANION GAP SERPL CALCULATED.3IONS-SCNC: 9 MMOL/L (ref 7–15)
BASOPHILS # BLD AUTO: 0 10E3/UL (ref 0–0.2)
BASOPHILS NFR BLD AUTO: 0 %
BUN SERPL-MCNC: 17 MG/DL (ref 8–23)
CALCIUM SERPL-MCNC: 9.3 MG/DL (ref 8.8–10.2)
CHLORIDE SERPL-SCNC: 96 MMOL/L (ref 98–107)
CREAT SERPL-MCNC: 0.82 MG/DL (ref 0.51–0.95)
DEPRECATED HCO3 PLAS-SCNC: 29 MMOL/L (ref 22–29)
EGFRCR SERPLBLD CKD-EPI 2021: 77 ML/MIN/1.73M2
EOSINOPHIL # BLD AUTO: 0.1 10E3/UL (ref 0–0.7)
EOSINOPHIL NFR BLD AUTO: 1 %
ERYTHROCYTE [DISTWIDTH] IN BLOOD BY AUTOMATED COUNT: 12.3 % (ref 10–15)
GLUCOSE SERPL-MCNC: 101 MG/DL (ref 70–99)
HCT VFR BLD AUTO: 44.5 % (ref 35–47)
HGB BLD-MCNC: 14.6 G/DL (ref 11.7–15.7)
HOLD SPECIMEN: NORMAL
IMM GRANULOCYTES # BLD: 0 10E3/UL
IMM GRANULOCYTES NFR BLD: 0 %
LYMPHOCYTES # BLD AUTO: 2.1 10E3/UL (ref 0.8–5.3)
LYMPHOCYTES NFR BLD AUTO: 22 %
MCH RBC QN AUTO: 31 PG (ref 26.5–33)
MCHC RBC AUTO-ENTMCNC: 32.8 G/DL (ref 31.5–36.5)
MCV RBC AUTO: 95 FL (ref 78–100)
MONOCYTES # BLD AUTO: 0.7 10E3/UL (ref 0–1.3)
MONOCYTES NFR BLD AUTO: 7 %
NEUTROPHILS # BLD AUTO: 6.5 10E3/UL (ref 1.6–8.3)
NEUTROPHILS NFR BLD AUTO: 70 %
NRBC # BLD AUTO: 0 10E3/UL
NRBC BLD AUTO-RTO: 0 /100
PLATELET # BLD AUTO: 222 10E3/UL (ref 150–450)
POTASSIUM SERPL-SCNC: 4.1 MMOL/L (ref 3.4–5.3)
RBC # BLD AUTO: 4.71 10E6/UL (ref 3.8–5.2)
SODIUM SERPL-SCNC: 134 MMOL/L (ref 135–145)
TROPONIN T SERPL HS-MCNC: 8 NG/L
TROPONIN T SERPL HS-MCNC: 8 NG/L
WBC # BLD AUTO: 9.4 10E3/UL (ref 4–11)

## 2023-12-11 PROCEDURE — 93005 ELECTROCARDIOGRAM TRACING: CPT | Performed by: STUDENT IN AN ORGANIZED HEALTH CARE EDUCATION/TRAINING PROGRAM

## 2023-12-11 PROCEDURE — 85025 COMPLETE CBC W/AUTO DIFF WBC: CPT | Performed by: STUDENT IN AN ORGANIZED HEALTH CARE EDUCATION/TRAINING PROGRAM

## 2023-12-11 PROCEDURE — 85025 COMPLETE CBC W/AUTO DIFF WBC: CPT | Performed by: EMERGENCY MEDICINE

## 2023-12-11 PROCEDURE — 71046 X-RAY EXAM CHEST 2 VIEWS: CPT

## 2023-12-11 PROCEDURE — 74174 CTA ABD&PLVS W/CONTRAST: CPT | Mod: MA

## 2023-12-11 PROCEDURE — 80048 BASIC METABOLIC PNL TOTAL CA: CPT | Performed by: EMERGENCY MEDICINE

## 2023-12-11 PROCEDURE — 36415 COLL VENOUS BLD VENIPUNCTURE: CPT | Performed by: EMERGENCY MEDICINE

## 2023-12-11 PROCEDURE — 250N000013 HC RX MED GY IP 250 OP 250 PS 637: Performed by: STUDENT IN AN ORGANIZED HEALTH CARE EDUCATION/TRAINING PROGRAM

## 2023-12-11 PROCEDURE — 84484 ASSAY OF TROPONIN QUANT: CPT | Performed by: EMERGENCY MEDICINE

## 2023-12-11 PROCEDURE — 99285 EMERGENCY DEPT VISIT HI MDM: CPT | Mod: 25

## 2023-12-11 PROCEDURE — 93005 ELECTROCARDIOGRAM TRACING: CPT | Performed by: EMERGENCY MEDICINE

## 2023-12-11 PROCEDURE — 36415 COLL VENOUS BLD VENIPUNCTURE: CPT | Performed by: STUDENT IN AN ORGANIZED HEALTH CARE EDUCATION/TRAINING PROGRAM

## 2023-12-11 PROCEDURE — 250N000011 HC RX IP 250 OP 636: Mod: JZ | Performed by: EMERGENCY MEDICINE

## 2023-12-11 PROCEDURE — 80048 BASIC METABOLIC PNL TOTAL CA: CPT | Performed by: STUDENT IN AN ORGANIZED HEALTH CARE EDUCATION/TRAINING PROGRAM

## 2023-12-11 PROCEDURE — 84484 ASSAY OF TROPONIN QUANT: CPT | Performed by: STUDENT IN AN ORGANIZED HEALTH CARE EDUCATION/TRAINING PROGRAM

## 2023-12-11 RX ORDER — ASPIRIN 81 MG/1
324 TABLET, CHEWABLE ORAL ONCE
Status: COMPLETED | OUTPATIENT
Start: 2023-12-11 | End: 2023-12-11

## 2023-12-11 RX ORDER — IOPAMIDOL 755 MG/ML
90 INJECTION, SOLUTION INTRAVASCULAR ONCE
Status: COMPLETED | OUTPATIENT
Start: 2023-12-11 | End: 2023-12-11

## 2023-12-11 RX ADMIN — IOPAMIDOL 90 ML: 755 INJECTION, SOLUTION INTRAVENOUS at 21:54

## 2023-12-11 RX ADMIN — ASPIRIN 324 MG: 81 TABLET, CHEWABLE ORAL at 19:03

## 2023-12-12 NOTE — ED TRIAGE NOTES
Patient has a cardiac history and is scheduled for surgery in march on her heart valve. Patient started having left upper chest pain at 1400 after eating today. Here with pardeep      Triage Assessment (Adult)       Row Name 12/11/23 9855          Triage Assessment    Airway WDL WDL        Respiratory WDL    Respiratory WDL WDL        Skin Circulation/Temperature WDL    Skin Circulation/Temperature WDL WDL        Cardiac WDL    Cardiac WDL chest pain        Peripheral/Neurovascular WDL    Peripheral Neurovascular WDL WDL        Cognitive/Neuro/Behavioral WDL    Cognitive/Neuro/Behavioral WDL WDL

## 2023-12-12 NOTE — ED PROVIDER NOTES
EMERGENCY DEPARTMENT ENCOUNTER      NAME: Maria Ines Molina  AGE: 68 year old female  YOB: 1955  MRN: [id number]  EVALUATION DATE & TIME: No admission date for patient encounter.    PCP: No Ref-Primary, Physician    ED PROVIDER: Neeru Ang M.D.      Chief Complaint   Patient presents with    Chest Pain         FINAL IMPRESSION:  1. Chest pain, unspecified type    2. Abdominal pain, unspecified abdominal location          ED COURSE & MEDICAL DECISION MAKING:    ED Course as of 12/11/23 2232   Mon Dec 11, 2023   2112 Patient wtih troponin 8 and atypical LUQ abdomen/left low chest wall pain now improved, with known thoracic aortic aneurysm and reassuring EKG amenable to repeat 2h delta troponin ordered now, CBC/BMP WNL, CTA CAP to ensure no acute changes/pathology and plan to clinically reassess, she feels markedly improved which is reassuring.   2231 Troponin 8 -> 8 therefore per high sensitivity troponin screening protocol ACS is ruled out and no signs of ACS at this time.   2231 CT CAP without acute pathology, unchanged ascending aortic aneurysm seen. Given relief of discomfort vaguely to LUQ region after some belching and passing flatus now, likely gas pains resolved. Patient discharged after being provided with extensive anticipatory guidance and given return precautions, importance of PMD follow-up emphasized.        Pertinent Labs & Imaging studies reviewed. (See chart for details)    N95 worn  A face shield was worn also  COVID PPE    Medical Decision Making    History:  Supplemental history from: Documented in chart, if applicable and Family Member/Significant Other  External Record(s) reviewed: Documented in chart, if applicable.    Work Up:  Chart documentation includes differential considered and any EKGs or imaging independently interpreted by provider, where specified.  In additional to work up documented, I considered the following work up: Documented in chart, if applicable.    External  consultation:  Discussion of management with another provider: Documented in chart, if applicable    Complicating factors:  Care impacted by chronic illness: Hyperlipidemia and Hypertension  Care affected by social determinants of health: N/A    Disposition considerations: Discharge. I recommended the patient continue their current prescription strength medication(s): lisinopril for HTN. I considered admission, but discharged patient after significant clinical improvement.        At the conclusion of the encounter I discussed the results of all of the tests and the disposition. The questions were answered. The patient or family acknowledged understanding and was agreeable with the care plan.     MEDICATIONS GIVEN IN THE EMERGENCY:  Medications   aspirin (ASA) chewable tablet 324 mg (324 mg Oral $Given 12/11/23 1903)   iopamidol (ISOVUE-370) solution 90 mL (90 mLs Intravenous $Given 12/11/23 2154)       NEW PRESCRIPTIONS STARTED AT TODAY'S ER VISIT  New Prescriptions    No medications on file          =================================================================    [facility]      Maria Ines Molina is a 68 year old female with PMHx of thoracic aortic aneurysm, HTN, HLD, elevated BMI who presents to the ED today via private vehicle BIB  for abdominal pain.    She reports onset 1400 today gradually left upper lateral abdominal wall into low chest wall pain up to 10/10 crampy, now improved to 2/10, no medications, initial presentation. + flatus. No dysuria/hematuria/urgency/frequency, no SOB. She has a heart valve procedure upcoming in March 2024 and has known ascending aortic aneurysm.      REVIEW OF SYSTEMS   All other systems reviewed and are negative except as noted above in HPI.    PAST MEDICAL HISTORY:  Past Medical History:   Diagnosis Date    Hyperlipidemia     Hypertension     Obesity     Thoracic aortic aneurysm (H24)        PAST SURGICAL HISTORY:  Past Surgical History:   Procedure Laterality Date     "COLONOSCOPY  10/11/2012    Diverticulosis, otherwise negative       CURRENT MEDICATIONS:    lisinopril (ZESTRIL) 10 MG tablet  multivitamin w/minerals (THERA-VIT-M) tablet        ALLERGIES:  No Known Allergies    FAMILY HISTORY:  No family history on file.    SOCIAL HISTORY:   Social History     Socioeconomic History    Marital status:    Tobacco Use    Smoking status: Former     Types: Cigarettes     Quit date: 1984     Years since quittin.9    Smokeless tobacco: Never   Substance and Sexual Activity    Alcohol use: Not Currently    Drug use: Never       VITALS:  Patient Vitals for the past 24 hrs:   BP Temp Temp src Pulse Resp SpO2 Height Weight   23 2120 (!) 146/112 -- -- 75 16 98 % -- --   23 1841 (!) 177/109 -- -- 82 -- 98 % -- --   23 1839 -- (!) 96.6  F (35.9  C) Temporal -- 16 -- 1.651 m (5' 5\") 97.5 kg (215 lb)       PHYSICAL EXAM    GENERAL: Awake, alert.  In no acute distress.   HEENT: Normocephalic, atraumatic.  Pupils equal, round and reactive.  Conjunctiva normal.  EOMI.  NECK: No stridor or apparent deformity.  PULMONARY: Symmetrical breath sounds without distress.  Lungs clear to auscultation bilaterally without wheezes, rhonchi or rales.  CARDIO: Regular rate and rhythm.  No significant murmur, rub or gallop.  Radial pulses strong and symmetrical.  ABDOMINAL: Abdomen soft, non-distended and non-tender to palpation.  No CVAT, no palpable hepatosplenomegaly.  EXTREMITIES: No lower extremity swelling or edema.    NEURO: Alert and oriented to person, place and time.  Cranial nerves grossly intact.  No focal motor deficit.  PSYCH: Normal mood and affect  SKIN: No rashes      LAB:  All pertinent labs reviewed and interpreted.  Results for orders placed or performed during the hospital encounter of 23   Chest XR,  PA & LAT    Impression    IMPRESSION:     The cardiac silhouette is normal in size. No new abnormal mediastinal interfaces. Hilar contours and lung " vascularity are normal.    The lungs are symmetrically inflated. There are no airspace or interstitial opacities.    Diaphragm curvature is normal. No pleural fluid is present.    Trace thoracic spine degenerative osteophytes are present. There is an intra-articular loose body in the left shoulder.      CTA Chest Abdomen Pelvis w Contrast    Impression    IMPRESSION:   1. Ascending aortic aneurysm measuring 4.9 cm in diameter, not significantly changed as compared to 11/01/2023. No evidence of aortic dissection.       Basic metabolic panel   Result Value Ref Range    Sodium 134 (L) 135 - 145 mmol/L    Potassium 4.1 3.4 - 5.3 mmol/L    Chloride 96 (L) 98 - 107 mmol/L    Carbon Dioxide (CO2) 29 22 - 29 mmol/L    Anion Gap 9 7 - 15 mmol/L    Urea Nitrogen 17.0 8.0 - 23.0 mg/dL    Creatinine 0.82 0.51 - 0.95 mg/dL    GFR Estimate 77 >60 mL/min/1.73m2    Calcium 9.3 8.8 - 10.2 mg/dL    Glucose 101 (H) 70 - 99 mg/dL   Result Value Ref Range    Troponin T, High Sensitivity 8 <=14 ng/L   CBC with platelets and differential   Result Value Ref Range    WBC Count 9.4 4.0 - 11.0 10e3/uL    RBC Count 4.71 3.80 - 5.20 10e6/uL    Hemoglobin 14.6 11.7 - 15.7 g/dL    Hematocrit 44.5 35.0 - 47.0 %    MCV 95 78 - 100 fL    MCH 31.0 26.5 - 33.0 pg    MCHC 32.8 31.5 - 36.5 g/dL    RDW 12.3 10.0 - 15.0 %    Platelet Count 222 150 - 450 10e3/uL    % Neutrophils 70 %    % Lymphocytes 22 %    % Monocytes 7 %    % Eosinophils 1 %    % Basophils 0 %    % Immature Granulocytes 0 %    NRBCs per 100 WBC 0 <1 /100    Absolute Neutrophils 6.5 1.6 - 8.3 10e3/uL    Absolute Lymphocytes 2.1 0.8 - 5.3 10e3/uL    Absolute Monocytes 0.7 0.0 - 1.3 10e3/uL    Absolute Eosinophils 0.1 0.0 - 0.7 10e3/uL    Absolute Basophils 0.0 0.0 - 0.2 10e3/uL    Absolute Immature Granulocytes 0.0 <=0.4 10e3/uL    Absolute NRBCs 0.0 10e3/uL   Extra Red Top Tube   Result Value Ref Range    Hold Specimen JIC    Result Value Ref Range    Troponin T, High Sensitivity 8 <=14  ng/L       RADIOLOGY:  Reviewed all pertinent imaging. Please see official radiology report.  CTA Chest Abdomen Pelvis w Contrast   Preliminary Result   IMPRESSION:    1. Ascending aortic aneurysm measuring 4.9 cm in diameter, not significantly changed as compared to 11/01/2023. No evidence of aortic dissection.            Chest XR,  PA & LAT   Final Result   IMPRESSION:       The cardiac silhouette is normal in size. No new abnormal mediastinal interfaces. Hilar contours and lung vascularity are normal.      The lungs are symmetrically inflated. There are no airspace or interstitial opacities.      Diaphragm curvature is normal. No pleural fluid is present.      Trace thoracic spine degenerative osteophytes are present. There is an intra-articular loose body in the left shoulder.                EKG:    Reviewed and interpreted [id number] normal sinus rhythm without ST changes with HR 72, morphologically similar to prior EKG from March 26, 2021      I have independently reviewed and interpreted the EKG(s) documented above.         Neeru Ang MD  12/11/23 4303

## 2023-12-13 ENCOUNTER — HOSPITAL ENCOUNTER (INPATIENT)
Facility: HOSPITAL | Age: 68
Setting detail: SURGERY ADMIT
End: 2023-12-13
Attending: SURGERY | Admitting: SURGERY
Payer: MEDICARE

## 2023-12-13 ENCOUNTER — PREP FOR PROCEDURE (OUTPATIENT)
Dept: ADMINISTRATIVE | Facility: CLINIC | Age: 68
End: 2023-12-13
Payer: MEDICARE

## 2023-12-13 DIAGNOSIS — I35.9 AORTIC VALVE DISEASE: Primary | ICD-10-CM

## 2023-12-13 DIAGNOSIS — I71.21 ANEURYSM OF ASCENDING AORTA WITHOUT RUPTURE (H): ICD-10-CM

## 2023-12-13 DIAGNOSIS — I35.9 AORTIC VALVE DISORDER: Primary | ICD-10-CM

## 2023-12-13 DIAGNOSIS — I35.9 AORTIC VALVE DISEASE: ICD-10-CM

## 2023-12-13 NOTE — LETTER
[facility]      Coronary Angiogram Instructions:    Maria Ines Wetzelx  [address] [address] [address]  [phone] (home)     You are scheduled for a coronary angiogram on Friday, March 1 at [facility], [facility] ([facility]), [address] [place] [address].      Your arrival time is 06:30 AM.  Location is [facility] - [address] [place] [address] - Main Entrance of the Hospital  Please plan on being at the hospital all day.  At any time, emergencies and/or urgent cases may come up which could delay the start of your procedure.    Pre-procedure instructions - Coronary Angiogram  Patient Education    Please check in at the main hospital desk at [facility] [address] [place] [address]. From there you will be directed to the second floor where you will check in at the Card[facility] Special [facility] ([facility]) for your procedure. Heart care staff at the desk will give you further direction by asking you to sit in the waiting room until a member from [facility] is able to come out and escort you back to the designated room for pre-procedure.    Pre-procedure medication instructions  Patient instructed on antiplatelet medication.  Continue medications as scheduled, with a small amount of water on the day of the procedure unless indicated.  Patient instructed to take 325 mg of Aspirin am of procedure: Yes  Other medication: instructed to only take LISINOPRIL the morning of the procedure.     Hold seven (7) days prior for once weekly injectable doses [semaglutide (Ozempic, Wegovy), dulaglutide  (Trulicity), exenatide ER (Bydureon), tirzepatide (Mounjaro)]   Hold the day before and day of for once daily injectable GLP-1 agonists [exenatide (Byetta), liraglutide (Saxenda,  Victoza)]   Hold seven (7) days for oral semaglutide (Rybelsus)    COVID-19: Patient was informed if they develop cold like symptoms they should self test for COVID-19 at home. If  results are positive, they should call [phone] to discuss next steps. This may include cancelling and rescheduling their procedure.  If patient is asymptomatic, no need to test for COVID-19.    Pre-procedure instructions  Patient instructed to be NPO after midnight.  Patient instructed to shower the evening before or the morning of the procedure.  Patient instructed to arrange for transportation home following procedure from a responsible family member of friend. No driving for at least 24 hours.  Patient instructed to have a responsible adult with them for 24 hours post-procedure.  Post-procedure follow up process.  Conscious sedation discussed.    Procedure cardiologist:  Deena  PCP:  No Ref-Primary, Physician  H&P completed by:  PCP  Admit date: 3/1  Arrival time:  0630 AM  Anticoagulation: None   CPAP: No  Previous PCI: NO  Bypass Grafts: No  Renal Issues: No  Diabetic?: NO  Device?: No  Type:  NA    Reason for Visit:  Patient seen for pre-procedure education in preparation for: AVR / ASCENDING AORTIC REPLACEMENT    Procedure Prep:  Primary Cardiologist note dated: NA  EKG results obtained, dated: PRE PROCEDURE  Hemogram results obtained: 3/4  Basic Metabolic Panel results obtained: 3/4  Lipid Profile results obtained: 8/11/2020    *PATIENTS RECORDS AVAILABLE IN [facility] UNLESS OTHERWISE INDICATED*      Patient Active Problem List   Diagnosis    Knee osteoarthritis    Obesity    Special screening for malignant neoplasms, colon    Ascending aortic aneurysm       Current Outpatient Medications   Medication Sig Dispense Refill    lisinopril (ZESTRIL) 10 MG tablet TAKE 1 TABLET(10 MG) BY MOUTH DAILY 90 tablet 0    multivitamin w/minerals (THERA-VIT-M) tablet Take 1 tablet by mouth daily         No Known Allergies      Anette Lazo RN   Cardiovascular Surgery  [phone]

## 2023-12-14 DIAGNOSIS — I71.21 ANEURYSM OF ASCENDING AORTA WITHOUT RUPTURE (H): Primary | ICD-10-CM

## 2023-12-14 DIAGNOSIS — I71.21 ANEURYSM OF ASCENDING AORTA WITHOUT RUPTURE (H): ICD-10-CM

## 2023-12-14 DIAGNOSIS — I35.9 AORTIC VALVE DISEASE: Primary | ICD-10-CM

## 2023-12-14 DIAGNOSIS — I35.9 AORTIC VALVE DISEASE: ICD-10-CM

## 2023-12-14 LAB
ATRIAL RATE - MUSE: 72 BPM
DIASTOLIC BLOOD PRESSURE - MUSE: NORMAL MMHG
INTERPRETATION ECG - MUSE: NORMAL
P AXIS - MUSE: 55 DEGREES
PR INTERVAL - MUSE: 160 MS
QRS DURATION - MUSE: 78 MS
QT - MUSE: 388 MS
QTC - MUSE: 424 MS
R AXIS - MUSE: 42 DEGREES
SYSTOLIC BLOOD PRESSURE - MUSE: NORMAL MMHG
T AXIS - MUSE: 68 DEGREES
VENTRICULAR RATE- MUSE: 72 BPM

## 2023-12-14 RX ORDER — FENTANYL CITRATE 50 UG/ML
25 INJECTION, SOLUTION INTRAMUSCULAR; INTRAVENOUS
Status: CANCELLED | OUTPATIENT
Start: 2024-03-01

## 2023-12-14 RX ORDER — ASPIRIN 81 MG/1
162 TABLET, CHEWABLE ORAL
Status: CANCELLED | OUTPATIENT
Start: 2023-12-14

## 2023-12-14 RX ORDER — DEXMEDETOMIDINE HYDROCHLORIDE 4 UG/ML
0.2-1.2 INJECTION, SOLUTION INTRAVENOUS CONTINUOUS
Status: CANCELLED | OUTPATIENT
Start: 2023-12-14

## 2023-12-14 RX ORDER — CHLORHEXIDINE GLUCONATE ORAL RINSE 1.2 MG/ML
10 SOLUTION DENTAL ONCE
Status: CANCELLED | OUTPATIENT
Start: 2023-12-14 | End: 2023-12-14

## 2023-12-14 RX ORDER — ASPIRIN 81 MG/1
81 TABLET, CHEWABLE ORAL
Status: CANCELLED | OUTPATIENT
Start: 2023-12-14

## 2023-12-14 RX ORDER — ASPIRIN 325 MG
325 TABLET ORAL ONCE
Status: CANCELLED | OUTPATIENT
Start: 2024-03-01 | End: 2023-12-14

## 2023-12-14 RX ORDER — SODIUM CHLORIDE, SODIUM GLUCONATE, SODIUM ACETATE, POTASSIUM CHLORIDE AND MAGNESIUM CHLORIDE 526; 502; 368; 37; 30 MG/100ML; MG/100ML; MG/100ML; MG/100ML; MG/100ML
1000 INJECTION, SOLUTION INTRAVENOUS
Status: CANCELLED | OUTPATIENT
Start: 2023-12-14 | End: 2023-12-14

## 2023-12-14 RX ORDER — CEFAZOLIN SODIUM 2 G/100ML
2 INJECTION, SOLUTION INTRAVENOUS
Status: CANCELLED | OUTPATIENT
Start: 2023-12-14

## 2023-12-14 RX ORDER — PHENYLEPHRINE HCL IN 0.9% NACL 50MG/250ML
.1-6 PLASTIC BAG, INJECTION (ML) INTRAVENOUS CONTINUOUS
Status: CANCELLED | OUTPATIENT
Start: 2023-12-14

## 2023-12-14 RX ORDER — SODIUM CHLORIDE 9 MG/ML
INJECTION, SOLUTION INTRAVENOUS CONTINUOUS
Status: CANCELLED | OUTPATIENT
Start: 2024-03-01

## 2023-12-14 RX ORDER — LIDOCAINE 40 MG/G
CREAM TOPICAL
Status: CANCELLED | OUTPATIENT
Start: 2023-12-14

## 2023-12-14 RX ORDER — CEFAZOLIN SODIUM 2 G/100ML
2 INJECTION, SOLUTION INTRAVENOUS SEE ADMIN INSTRUCTIONS
Status: CANCELLED | OUTPATIENT
Start: 2023-12-14

## 2023-12-14 RX ORDER — ASPIRIN 81 MG/1
243 TABLET, CHEWABLE ORAL ONCE
Status: CANCELLED | OUTPATIENT
Start: 2024-03-01

## 2023-12-14 NOTE — LETTER
[facility]      Coronary Angiogram Instructions:    Maria Ines CLAY Molina  [address] [address]  [phone] (home)     You are scheduled for a coronary angiogram on Friday, December 22 at [facility] ([facility] [address] [place] [address].      Your arrival time is 0630 AM.  Location is [facility] - [address], [place] [address] - Main Entrance of the Hospital  Please plan on being at the hospital all day.  At any time, emergencies and/or urgent cases may come up which could delay the start of your procedure.  4.   Your blood work will be completed when you check into the [facility].    Pre-procedure instructions - Coronary Angiogram  Patient Education    Please check in at the main hospital desk at [facility] [address] [place] [address]. From there you will be directed to the second floor where you will check in at the [facility] ([facility] for your procedure. Heart care staff at the desk will give you further direction by asking you to sit in the waiting room until a member from [facility] is able to come out and escort you back to the designated room for pre-procedure.    COVID-19: Patient was informed if they develop cold like symptoms they should self test for COVID-19 at home. If results are positive, they should call [phone] to discuss next steps. This may include cancelling and rescheduling their procedure.  If patient is asymptomatic, no need to test for COVID-19.    Pre-procedure instructions  Patient instructed to be NPO after midnight.  Patient instructed to shower the evening before or the morning of the procedure.  Patient instructed to arrange for transportation home following procedure from a responsible family member of friend. No driving for at least 24 hours.  Patient instructed to have a responsible adult with them for 24 hours post-procedure.  Post-procedure follow up  process.  Conscious sedation discussed.    Pre-procedure medication instructions  Patient instructed on antiplatelet medication.  Continue medications as scheduled, with a small amount of water on the day of the procedure unless indicated.  Patient instructed to take 325 mg of Aspirin am of procedure: Yes  Other medication: instructed to only take (none) the morning of the procedure.     Hold seven (7) days prior for once weekly injectable doses [semaglutide (Ozempic, Wegovy), dulaglutide  (Trulicity), exenatide ER (Bydureon), tirzepatide (Mounjaro)]   Hold the day before and day of for once daily injectable GLP-1 agonists [exenatide (Byetta), liraglutide (Saxenda,  Victoza)]   Hold seven (7) days for oral semaglutide (Rybelsus)    Procedure cardiologist:  NATALIIA  PCP:  No Ref-Primary, Physician  H&P completed by:  WILLIAMS  Admit date: 12/22  Arrival time:  0630 AM  Anticoagulation: None   CPAP: No  Previous PCI: NO  Bypass Grafts: No  Renal Issues: No  Diabetic?: No  Device?: No  Type:  NA    Reason for Visit:  Patient seen for pre-procedure education in preparation for: AORTIC VALVE AND ASCENDING AORTIC REPLACEMENT.    Procedure Prep:  Primary Cardiologist note dated: 2020  EKG results obtained, dated: PRE PROCEDURE  Hemogram results obtained: PRE PROCEDURE  Basic Metabolic Panel results obtained: PRE PROCEDURE  Lipid Profile results obtained: 8/11/2020    *PATIENTS RECORDS AVAILABLE IN [facility] UNLESS OTHERWISE INDICATED*      Patient Active Problem List   Diagnosis    Knee osteoarthritis    Obesity    Special screening for malignant neoplasms, colon    Ascending aortic aneurysm       Current Outpatient Medications   Medication Sig Dispense Refill    lisinopril (ZESTRIL) 10 MG tablet TAKE 1 TABLET(10 MG) BY MOUTH DAILY 90 tablet 0    multivitamin w/minerals (THERA-VIT-M) tablet Take 1 tablet by mouth daily         No Known Allergies      ADY Lazo, RN   Cardiovascular Surgery  [phone]

## 2023-12-19 ENCOUNTER — TELEPHONE (OUTPATIENT)
Dept: ADMINISTRATIVE | Facility: CLINIC | Age: 68
End: 2023-12-19
Payer: MEDICARE

## 2023-12-19 NOTE — TELEPHONE ENCOUNTER
Per task, pt wants to r/s their 3/5 surgery with Dr. Morales. LM asking pt to call back. Will try again later

## 2024-02-27 ENCOUNTER — TELEPHONE (OUTPATIENT)
Dept: CARDIOLOGY | Facility: CLINIC | Age: 69
End: 2024-02-27
Payer: MEDICARE

## 2024-02-27 NOTE — TELEPHONE ENCOUNTER
----- Message from Alfredo Morales MD sent at 2/26/2024  3:05 PM CST -----  Sierra    I am not comfortable with a CTA in her case. Coronary angiography is the gold standard and standard of care in the community.    Thank you  Sunil  ----- Message -----  From: Anette Lazo RN  Sent: 2/26/2024  12:18 PM CST  To: Alfredo Morales MD    She is having a hard time coming to  with this whole surgery thing. We've rescheduled her surgery and cath a number of times, and now she wants me to ask you if she can have a CT angiogram instead of a cath.    Let me know what you think,  Sierra

## 2024-02-28 ENCOUNTER — TELEPHONE (OUTPATIENT)
Dept: CARDIOLOGY | Facility: CLINIC | Age: 69
End: 2024-02-28
Payer: MEDICARE

## 2024-02-28 NOTE — H&P (VIEW-ONLY)
Preoperative History and Physical Examination    Date of Exam: 2/28/2024  Proposed Surgery: Coronary Angiogram and aortic valve replacement with ascending aortic replacement   Surgeon: Jose Cruz Spaulding, and  Alfredo Morales  Date of Surgery: 3/1/24 and 3/26/24  Location of Surgery:  [facility]  Fax number: [phone]  and  [phone]    Maria Ines Molina is an 69 y.o. female who presents for a preoperative clearance history and physical exam at the request of the above mentioned surgeon for the surgery indicated.     Copy of this evaluation and/or correspondence will be faxed to the above hospital/surgery center and/or surgeon's office.     INDICATION FOR SURGERY: Aneurysm of ascending aorta, bicuspid aorta.    HPI: Found incidentally when had imaging. Denies symptoms.   Apprehensive about surgery.  She has delayed her angiogram until March 18 and surgery for March 16.  She has a consultation with [facility] around March 10.  At that visit, they will do a chest x-ray, EKG, and repeat echo as part of the consultation.    CURRENT COMORBIDITIES: Hypertension      USE OF ANTITHROMBOTIC: None    Current Outpatient Medications:   Medication Sig     lisinopriL (PRINIVIL) 10 mg oral tablet 1 tablet (10 mg) once daily.       History of Anesthetic Reaction (personal or family)? no    Recent steroid use (last 6 months)? no    Immunizations up to date? no - declines    Immunization History   Administered Date(s) Administered     Tdap >7 yrs 08/17/2009       REVIEW OF SYSTEMS:  see HPI; otherwise denies HEENT, NECK, RESP, CARDIAC, GI, , NEURO or PSYCH Sx    REVISED CARDIAC RISK INDEX:   History of Ischemic Heart Disease: no  History of Congestive Heart Failure: no  History of Cerebrovascular Disease (stroke/TIA): no  History of Diabetes requiring preoperative insulin use: no  Chronic Kidney Disease (Cr > 2): no  Undergoing suprainguinal vascular, intraperitoneal, or intrathoracic surgery: no  Risk for cardiac  "death, non-fatal MI, and non-fatal cardiac arrest: 0 predictors - 0.4%    PHYSICAL EXAM:   BP (!) 138/92   Pulse 88   Temp 98.2  F (36.8  C) (Oral)   Resp 16   Ht 5' 5.5\" (1.664 m)   Wt 98 kg (216 lb)   SpO2 99%   BMI 35.40 kg/m    LMP: No LMP recorded.  General - Alert & oriented, pleasant and comfortable.   Head - Normocephalic, atraumatic.  Eyes - Pupils are equal, round and reactive to light bilaterally.  Extraocular movements are intact bilaterally. Sclera and conjunctiva clear. Lids without lesions  Ears - Tympanic membranes clear bilaterally. External canals without lesion.  Nose - Nares normal. Septum midline. Mucosa normal.   Mouth - Oropharynx is clear without exudates.  Neck - Normal appearing, no cervical adenopathy or carotid bruits noted.  Lungs - Clear to auscultation bilaterally, no wheezes, rales or rhonchi.  CV - Regular rate and rhythm, no murmurs, rubs or gallops.  Abdomen - Non-tender, non-distended, positive bowel sounds, no masses, no hepatosplenomegaly. No rebound or guarding.   Extremities - No edema or deformities. Palpable pulses strong bilaterally.  Skin - warm, dry, intact. No rashes or erythema.  Neurologic - Cranial nerves 2-12 intact, patellar reflexes intact. Muscle tone, bulk and strength within normal limits throughout.  Psych - Judgment and mental status are clear, patient has reasonable insight. Mood is stable.    LABS/IMAGING:     Results for orders placed or performed in visit on 02/28/24 (from the past 24 hour(s))   EKG WITH INTERPRETATION/REPORT   Result Value Ref Range    EKG         BMP, CBC  EKG: Interpretation: Sinus rhythm, nonspecific T wave abnormality noted.  No change in comparison to prior tracing in November 2023.  (personally reviewed)  CXR: Planning on doing this at [facility] on March 10.  Spirometry: Not indicated    ASSESSMENT/PLAN:    1. Preoperative examination  Cleared for surgery pending chest x-ray coming up on March 10.  - BASIC METAB PROFILE OP  - CBC " (HGB,HCT,WBC,RBC,PLATELET) OP    2. Bicuspid aortic valve determined by imaging  Reason for surgery    3. Aortic valve stenosis, etiology of cardiac valve disease unspecified  Reason for surgery    4. Aneurysm of ascending aorta without rupture (HCC)  Reason for surgery    5. Primary hypertension  Chronic, controlled.   Hold lisinopril the morning of surgery unless otherwise directed.  - EKG WITH INTERPRETATION/REPORT        RECOMMENDATIONS:   Preoperative Risk Assesment:  - Based on the inherent risks of procedure, anesthesia, and the patient's comorbid medical conditions, the patient is stratified as a medium risk candidate for the planned procedure.  - Further tests are advised to further risk stratify Maria Ines Molina prior to surgery.  Chest x-ray being done through [address]    Patient was advised to D/c all NSAID's, fish oil, and ASA derivatives one week prior to surgery, and that these may be resumed postoperatively unless instructed otherwise.     Patient does not require perioperative bridging of anticoagulation.     Other diagnoses as noted in the Assessment and Plan are stable at the present time unless otherwise stated.     Additional recommendations: none      Advised to bring Advanced Directive on day of surgery if one is available.     Patient had the opportunity to have all her questions answered at today's visit.     ELFEGO Reno, CNP    Total time spent today for visit was 30 minutes and included: Direct face-to-face time, Review of records, and Documentation of visit.

## 2024-03-01 ENCOUNTER — HOSPITAL ENCOUNTER (OUTPATIENT)
Facility: HOSPITAL | Age: 69
Discharge: HOME | End: 2024-03-01
Attending: INTERNAL MEDICINE | Admitting: INTERNAL MEDICINE
Payer: MEDICARE

## 2024-03-01 VITALS
HEART RATE: 67 BPM | DIASTOLIC BLOOD PRESSURE: 79 MMHG | RESPIRATION RATE: 16 BRPM | HEIGHT: 65 IN | TEMPERATURE: 98 F | OXYGEN SATURATION: 98 % | SYSTOLIC BLOOD PRESSURE: 138 MMHG | WEIGHT: 216 LBS | BODY MASS INDEX: 35.99 KG/M2

## 2024-03-01 DIAGNOSIS — I35.9 AORTIC VALVE DISEASE: ICD-10-CM

## 2024-03-01 DIAGNOSIS — I71.21 ANEURYSM OF ASCENDING AORTA WITHOUT RUPTURE (H): ICD-10-CM

## 2024-03-01 DIAGNOSIS — I25.10 CORONARY ARTERY DISEASE INVOLVING NATIVE CORONARY ARTERY OF NATIVE HEART WITHOUT ANGINA PECTORIS: Primary | ICD-10-CM

## 2024-03-01 PROBLEM — Z98.890 STATUS POST CORONARY ANGIOGRAM: Status: ACTIVE | Noted: 2024-03-01

## 2024-03-01 LAB
ABO/RH(D): NORMAL
ANTIBODY SCREEN: NEGATIVE
ATRIAL RATE - MUSE: 68 BPM
DIASTOLIC BLOOD PRESSURE - MUSE: NORMAL MMHG
INTERPRETATION ECG - MUSE: NORMAL
P AXIS - MUSE: 52 DEGREES
PR INTERVAL - MUSE: 170 MS
QRS DURATION - MUSE: 84 MS
QT - MUSE: 386 MS
QTC - MUSE: 410 MS
R AXIS - MUSE: 31 DEGREES
SPECIMEN EXPIRATION DATE: NORMAL
SYSTOLIC BLOOD PRESSURE - MUSE: NORMAL MMHG
T AXIS - MUSE: 80 DEGREES
VENTRICULAR RATE- MUSE: 68 BPM

## 2024-03-01 PROCEDURE — 250N000011 HC RX IP 250 OP 636: Performed by: INTERNAL MEDICINE

## 2024-03-01 PROCEDURE — 36415 COLL VENOUS BLD VENIPUNCTURE: CPT | Performed by: NURSE PRACTITIONER

## 2024-03-01 PROCEDURE — 93454 CORONARY ARTERY ANGIO S&I: CPT | Performed by: INTERNAL MEDICINE

## 2024-03-01 PROCEDURE — C1894 INTRO/SHEATH, NON-LASER: HCPCS | Performed by: INTERNAL MEDICINE

## 2024-03-01 PROCEDURE — 86900 BLOOD TYPING SEROLOGIC ABO: CPT | Performed by: NURSE PRACTITIONER

## 2024-03-01 PROCEDURE — 255N000002 HC RX 255 OP 636: Performed by: INTERNAL MEDICINE

## 2024-03-01 PROCEDURE — 93010 ELECTROCARDIOGRAM REPORT: CPT | Mod: HOP | Performed by: INTERNAL MEDICINE

## 2024-03-01 PROCEDURE — 93454 CORONARY ARTERY ANGIO S&I: CPT | Mod: 26 | Performed by: INTERNAL MEDICINE

## 2024-03-01 PROCEDURE — 99152 MOD SED SAME PHYS/QHP 5/>YRS: CPT | Performed by: INTERNAL MEDICINE

## 2024-03-01 PROCEDURE — 272N000001 HC OR GENERAL SUPPLY STERILE: Performed by: INTERNAL MEDICINE

## 2024-03-01 PROCEDURE — 258N000003 HC RX IP 258 OP 636: Performed by: SURGERY

## 2024-03-01 PROCEDURE — 999N000054 HC STATISTIC EKG NON-CHARGEABLE

## 2024-03-01 PROCEDURE — 93005 ELECTROCARDIOGRAM TRACING: CPT

## 2024-03-01 PROCEDURE — 250N000013 HC RX MED GY IP 250 OP 250 PS 637: Performed by: NURSE PRACTITIONER

## 2024-03-01 RX ORDER — NALOXONE HYDROCHLORIDE 0.4 MG/ML
0.4 INJECTION, SOLUTION INTRAMUSCULAR; INTRAVENOUS; SUBCUTANEOUS
Status: DISCONTINUED | OUTPATIENT
Start: 2024-03-01 | End: 2024-03-01 | Stop reason: HOSPADM

## 2024-03-01 RX ORDER — ATORVASTATIN CALCIUM 40 MG/1
40 TABLET, FILM COATED ORAL DAILY
Qty: 90 TABLET | Refills: 3 | Status: SHIPPED | OUTPATIENT
Start: 2024-03-01 | End: 2024-08-28

## 2024-03-01 RX ORDER — NALOXONE HYDROCHLORIDE 0.4 MG/ML
0.2 INJECTION, SOLUTION INTRAMUSCULAR; INTRAVENOUS; SUBCUTANEOUS
Status: DISCONTINUED | OUTPATIENT
Start: 2024-03-01 | End: 2024-03-01 | Stop reason: HOSPADM

## 2024-03-01 RX ORDER — OXYCODONE HYDROCHLORIDE 5 MG/1
5 TABLET ORAL EVERY 4 HOURS PRN
Status: DISCONTINUED | OUTPATIENT
Start: 2024-03-01 | End: 2024-03-01 | Stop reason: HOSPADM

## 2024-03-01 RX ORDER — LIDOCAINE 40 MG/G
CREAM TOPICAL
Status: DISCONTINUED | OUTPATIENT
Start: 2024-03-01 | End: 2024-03-01 | Stop reason: HOSPADM

## 2024-03-01 RX ORDER — SODIUM CHLORIDE 9 MG/ML
INJECTION, SOLUTION INTRAVENOUS CONTINUOUS
Status: DISCONTINUED | OUTPATIENT
Start: 2024-03-01 | End: 2024-03-01 | Stop reason: HOSPADM

## 2024-03-01 RX ORDER — FENTANYL CITRATE 50 UG/ML
25 INJECTION, SOLUTION INTRAMUSCULAR; INTRAVENOUS
Status: DISCONTINUED | OUTPATIENT
Start: 2024-03-01 | End: 2024-03-01 | Stop reason: HOSPADM

## 2024-03-01 RX ORDER — HEPARIN SODIUM 1000 [USP'U]/ML
INJECTION, SOLUTION INTRAVENOUS; SUBCUTANEOUS
Status: DISCONTINUED | OUTPATIENT
Start: 2024-03-01 | End: 2024-03-01 | Stop reason: HOSPADM

## 2024-03-01 RX ORDER — NITROGLYCERIN 5 MG/ML
VIAL (ML) INTRAVENOUS
Status: DISCONTINUED | OUTPATIENT
Start: 2024-03-01 | End: 2024-03-01 | Stop reason: HOSPADM

## 2024-03-01 RX ORDER — ASPIRIN 81 MG/1
243 TABLET, CHEWABLE ORAL ONCE
Status: COMPLETED | OUTPATIENT
Start: 2024-03-01 | End: 2024-03-01

## 2024-03-01 RX ORDER — DIAZEPAM 5 MG
5 TABLET ORAL ONCE
Status: COMPLETED | OUTPATIENT
Start: 2024-03-01 | End: 2024-03-01

## 2024-03-01 RX ORDER — ACETAMINOPHEN 325 MG/1
650 TABLET ORAL EVERY 4 HOURS PRN
Status: DISCONTINUED | OUTPATIENT
Start: 2024-03-01 | End: 2024-03-01 | Stop reason: HOSPADM

## 2024-03-01 RX ORDER — FLUMAZENIL 0.1 MG/ML
0.2 INJECTION, SOLUTION INTRAVENOUS
Status: DISCONTINUED | OUTPATIENT
Start: 2024-03-01 | End: 2024-03-01 | Stop reason: HOSPADM

## 2024-03-01 RX ORDER — IODIXANOL 320 MG/ML
INJECTION, SOLUTION INTRAVASCULAR
Status: DISCONTINUED | OUTPATIENT
Start: 2024-03-01 | End: 2024-03-01 | Stop reason: HOSPADM

## 2024-03-01 RX ORDER — ASPIRIN 81 MG/1
81 TABLET ORAL DAILY
Qty: 90 TABLET | Refills: 3 | Status: SHIPPED | OUTPATIENT
Start: 2024-03-02 | End: 2024-08-28

## 2024-03-01 RX ORDER — ASPIRIN 325 MG
325 TABLET ORAL ONCE
Status: COMPLETED | OUTPATIENT
Start: 2024-03-01 | End: 2024-03-01

## 2024-03-01 RX ORDER — OXYCODONE HYDROCHLORIDE 5 MG/1
10 TABLET ORAL EVERY 4 HOURS PRN
Status: DISCONTINUED | OUTPATIENT
Start: 2024-03-01 | End: 2024-03-01 | Stop reason: HOSPADM

## 2024-03-01 RX ORDER — ASPIRIN 81 MG/1
81 TABLET ORAL DAILY
Status: DISCONTINUED | OUTPATIENT
Start: 2024-03-02 | End: 2024-03-01 | Stop reason: HOSPADM

## 2024-03-01 RX ORDER — FENTANYL CITRATE 50 UG/ML
INJECTION, SOLUTION INTRAMUSCULAR; INTRAVENOUS
Status: DISCONTINUED | OUTPATIENT
Start: 2024-03-01 | End: 2024-03-01 | Stop reason: HOSPADM

## 2024-03-01 RX ORDER — ATROPINE SULFATE 0.1 MG/ML
0.5 INJECTION INTRAVENOUS
Status: DISCONTINUED | OUTPATIENT
Start: 2024-03-01 | End: 2024-03-01 | Stop reason: HOSPADM

## 2024-03-01 RX ADMIN — DIAZEPAM 5 MG: 5 TABLET ORAL at 08:11

## 2024-03-01 RX ADMIN — SODIUM CHLORIDE: 9 INJECTION, SOLUTION INTRAVENOUS at 07:06

## 2024-03-01 ASSESSMENT — ACTIVITIES OF DAILY LIVING (ADL)
ADLS_ACUITY_SCORE: 35

## 2024-03-01 NOTE — DISCHARGE INSTRUCTIONS

## 2024-03-01 NOTE — PRE-PROCEDURE
GENERAL PRE-PROCEDURE:   Procedure:  Coronary angiogram, left heart catheterization  Date/Time:  3/1/2024 7:16 AM    Written consent obtained?: Yes    Risks and benefits: Risks, benefits and alternatives were discussed    Consent given by:  Patient  Patient states understanding of procedure being performed: Yes    Patient's understanding of procedure matches consent: Yes    Procedure consent matches procedure scheduled: Yes    Expected level of sedation:  Moderate  Appropriately NPO:  Yes  ASA Class:  4 (severe aortic stenosis, has been aortic valve, ascending aortic aneurysm, class II obesity; BMI 35.94 kg/m )  Mallampati  :  Grade 2- soft palate, base of uvula, tonsillar pillars, and portion of posterior pharyngeal wall visible  Lungs:  Lungs clear with good breath sounds bilaterally  Heart:  Systolic murmur  History & Physical reviewed:  History and physical reviewed and updates made (see comment)  Statement of review:  I have reviewed the lab findings, diagnostic data, medications, and the plan for sedation

## 2024-03-01 NOTE — PROGRESS NOTES
Brief CV progress note:    Maria Ines Molina is a 69-year-old WF with past medical history of severe aortic stenosis, bicuspid aortic valve, ascending aortic aneurysm, class II obesity; BMI 35.94 kg/m  who underwent diagnostic coronary angiography in anticipation of open heart surgery.  Coronary angiography demonstrated moderate non-obstructive CAD.  Patient discussed with Dr. Omalley.  Will add ASA and statin to previously prescribed cardiac medications.

## 2024-03-05 ENCOUNTER — TELEPHONE (OUTPATIENT)
Dept: CARDIOLOGY | Facility: CLINIC | Age: 69
End: 2024-03-05
Payer: MEDICARE

## 2024-03-05 NOTE — TELEPHONE ENCOUNTER
----- Message from Alfredo Morales MD sent at 3/4/2024  2:18 PM CST -----    Sierra    We can leave it. If it worsens in the future, it would be amenable to PCI/EZEQUIEL. It is not significant enough to cause any trouble now.    Thank you  Sunil      ----- Message -----  From: Anette Lazo RN  Sent: 3/4/2024   8:51 AM CST  To: Alfredo Morales MD    RCA looks to have a 50% blockage on her. Can you take a look and let me know if you'd like to bypass that or leave it alone?    Thanks,  Sierra

## 2024-03-14 ENCOUNTER — TELEPHONE (OUTPATIENT)
Dept: CARDIOLOGY | Facility: CLINIC | Age: 69
End: 2024-03-14
Payer: MEDICARE

## 2024-03-14 NOTE — TELEPHONE ENCOUNTER
Patient called to inform that she will be cancelling her surgery with Dr. Morales on 3/26/24. She states that she is undecided on wether to have surgery at this time and wants to wait. She also went to [place] for a second opinion and was given a window of 3-6 months optimal surgery timeline. Patient encouraged to follow up with her cardiologist/PCP for further management of the condition.  Maurisio Acosta, RNCC  Cardiothoracic Surgery   [facility]  O) [phone]  F) [phone]

## 2024-04-15 ENCOUNTER — TELEPHONE (OUTPATIENT)
Dept: CARDIOLOGY | Facility: CLINIC | Age: 69
End: 2024-04-15
Payer: MEDICARE

## 2024-07-16 ENCOUNTER — OFFICE VISIT (OUTPATIENT)
Dept: CARDIOLOGY | Facility: CLINIC | Age: 69
End: 2024-07-16
Payer: MEDICARE

## 2024-07-16 VITALS
WEIGHT: 207 LBS | RESPIRATION RATE: 14 BRPM | SYSTOLIC BLOOD PRESSURE: 129 MMHG | DIASTOLIC BLOOD PRESSURE: 88 MMHG | BODY MASS INDEX: 34.45 KG/M2 | HEART RATE: 93 BPM

## 2024-07-16 DIAGNOSIS — I35.9 AORTIC VALVE DISEASE: Primary | ICD-10-CM

## 2024-07-16 DIAGNOSIS — I71.21 ANEURYSM OF ASCENDING AORTA WITHOUT RUPTURE (H): ICD-10-CM

## 2024-07-16 DIAGNOSIS — R09.89 OTHER SPECIFIED SYMPTOMS AND SIGNS INVOLVING THE CIRCULATORY AND RESPIRATORY SYSTEMS: ICD-10-CM

## 2024-07-16 PROCEDURE — 99213 OFFICE O/P EST LOW 20 MIN: CPT | Performed by: SURGERY

## 2024-07-16 NOTE — LETTER
7/16/2024    Physician No Ref-Primary  No address on file    RE: Maria Ines Molina       Dear Colleague,     I had the pleasure of seeing Maria Ines Molina in the [facility].  Cardiac surgery     Ms. Molina is a 67 year-old woman with a bicuspid aortic valve with known moderate-severe aortic stenosis, as well as mild aortic regurgitation and an ascending aortic aneurysm measuring 5 cm. The ascending aortic aneurysm has been followed for several years now, and it has been fairly less stable. She is minimally symptomatic and remains active swimming. She has considered surgery, but then thought to wait. She had a second opinion with a similar recommendation for surgery, and now she returns to discuss this more. I answered all of her questions, and we discussed the option of surgery more. She would have an aortic valve replacement with a bioprosthetic valve and ascending aortic repair with a Gelweave graft.      We already previously discussed the ACC/AHA recommendation is to replace the ascending aorta if it is over 4.5 cm in patients with a bicuspid aortic valve at the time of aortic valve replacement. I still believe that with her mean aortic valve gradient increasing and her aortic stenosis being moderate-severe, it is reasonable to proceed with surgery whenever she is comfortable and resolved with this.      She is going to consider all of this and let us know when she would like to have surgery. We will check another transthoracic echocardiogram and a carotid ultrasound.     She recently had coronary angiography, and this demonstrates that the proximal to mid right coronary artery has a 50% ossific stenosis followed by mild diffuse disease of the right coronary artery and its branches distally. I would not recommend intervention on this at this time.    Thank you for allowing me to participate in the care of your patient.      Sincerely,     Alfredo Morales MD   [facility]  of [facility]  cc:   Alfredo Morales MD  [address]  [place] [address]

## 2024-07-16 NOTE — PATIENT INSTRUCTIONS
You were seen today in the [facility]    Schedule echocardiogram and carotid ultrasound. Zakia will call you to give you a surgery date.    Please call ADY Cheema surgery coordinator with any questions.  Thank you.    Anette Lazo RN  Cardiovascular Surgery  Phone [phone]  Fax [phone]

## 2024-07-16 NOTE — PROGRESS NOTES
Cardiac surgery     Ms. Molina is a 67 year-old woman with a bicuspid aortic valve with known moderate-severe aortic stenosis, as well as mild aortic regurgitation and an ascending aortic aneurysm measuring 5 cm. The ascending aortic aneurysm has been followed for several years now, and it has been fairly less stable. She is minimally symptomatic and remains active swimming. She has considered surgery, but then thought to wait. She had a second opinion with a similar recommendation for surgery, and now she returns to discuss this more. I answered all of her questions, and we discussed the option of surgery more. She would have an aortic valve replacement with a bioprosthetic valve and ascending aortic repair with a Gelweave graft.      We already previously discussed the ACC/AHA recommendation is to replace the ascending aorta if it is over 4.5 cm in patients with a bicuspid aortic valve at the time of aortic valve replacement. I still believe that with her mean aortic valve gradient increasing and her aortic stenosis being moderate-severe, it is reasonable to proceed with surgery whenever she is comfortable and resolved with this.      She is going to consider all of this and let us know when she would like to have surgery. We will check another transthoracic echocardiogram and a carotid ultrasound.     She recently had coronary angiography, and this demonstrates that the proximal to mid right coronary artery has a 50% ossific stenosis followed by mild diffuse disease of the right coronary artery and its branches distally. I would not recommend intervention on this at this time.    Alfredo Morales MD

## 2024-07-23 ENCOUNTER — HOSPITAL ENCOUNTER (OUTPATIENT)
Dept: ULTRASOUND IMAGING | Facility: CLINIC | Age: 69
Discharge: HOME | End: 2024-07-23
Attending: SURGERY
Payer: MEDICARE

## 2024-07-23 ENCOUNTER — HOSPITAL ENCOUNTER (OUTPATIENT)
Dept: CARDIOLOGY | Facility: CLINIC | Age: 69
Discharge: HOME | End: 2024-07-23
Attending: SURGERY
Payer: MEDICARE

## 2024-07-23 DIAGNOSIS — I35.9 AORTIC VALVE DISEASE: ICD-10-CM

## 2024-07-23 DIAGNOSIS — I71.21 ANEURYSM OF ASCENDING AORTA WITHOUT RUPTURE (H): ICD-10-CM

## 2024-07-23 DIAGNOSIS — R09.89 OTHER SPECIFIED SYMPTOMS AND SIGNS INVOLVING THE CIRCULATORY AND RESPIRATORY SYSTEMS: ICD-10-CM

## 2024-07-23 LAB — LVEF ECHO: NORMAL

## 2024-07-23 PROCEDURE — 93880 EXTRACRANIAL BILAT STUDY: CPT

## 2024-07-23 PROCEDURE — 93306 TTE W/DOPPLER COMPLETE: CPT

## 2024-07-23 PROCEDURE — 93306 TTE W/DOPPLER COMPLETE: CPT | Mod: 26 | Performed by: INTERNAL MEDICINE

## 2024-07-30 ENCOUNTER — PREP FOR PROCEDURE (OUTPATIENT)
Dept: ADMINISTRATIVE | Facility: CLINIC | Age: 69
End: 2024-07-30
Payer: MEDICARE

## 2024-07-30 DIAGNOSIS — R09.89 OTHER SPECIFIED SYMPTOMS AND SIGNS INVOLVING THE CIRCULATORY AND RESPIRATORY SYSTEMS: ICD-10-CM

## 2024-07-30 DIAGNOSIS — I71.21 ANEURYSM OF ASCENDING AORTA WITHOUT RUPTURE (H): ICD-10-CM

## 2024-07-30 DIAGNOSIS — I35.9 AORTIC VALVE DISEASE: Primary | ICD-10-CM

## 2024-07-31 ENCOUNTER — TELEPHONE (OUTPATIENT)
Dept: CARDIOLOGY | Facility: CLINIC | Age: 69
End: 2024-07-31
Payer: MEDICARE

## 2024-08-02 ENCOUNTER — TELEPHONE (OUTPATIENT)
Dept: CARDIOLOGY | Facility: CLINIC | Age: 69
End: 2024-08-02
Payer: MEDICARE

## 2024-08-02 RX ORDER — FAMOTIDINE 20 MG/1
20 TABLET, FILM COATED ORAL
Status: CANCELLED | OUTPATIENT
Start: 2024-08-02

## 2024-08-26 DIAGNOSIS — I71.21 ANEURYSM OF ASCENDING AORTA WITHOUT RUPTURE (H): ICD-10-CM

## 2024-08-26 DIAGNOSIS — I35.9 AORTIC VALVE DISEASE: Primary | ICD-10-CM

## 2024-08-28 ENCOUNTER — HOSPITAL ENCOUNTER (OUTPATIENT)
Dept: SURGERY | Facility: HOSPITAL | Age: 69
Discharge: HOME | End: 2024-08-28
Attending: SURGERY
Payer: MEDICARE

## 2024-08-28 ENCOUNTER — HOSPITAL ENCOUNTER (OUTPATIENT)
Dept: RADIOLOGY | Facility: HOSPITAL | Age: 69
Discharge: HOME | End: 2024-08-28
Attending: SURGERY
Payer: MEDICARE

## 2024-08-28 ENCOUNTER — ANESTHESIA EVENT (OUTPATIENT)
Dept: SURGERY | Facility: HOSPITAL | Age: 69
DRG: 220 | End: 2024-08-28
Payer: MEDICARE

## 2024-08-28 VITALS
DIASTOLIC BLOOD PRESSURE: 92 MMHG | WEIGHT: 208 LBS | TEMPERATURE: 98 F | OXYGEN SATURATION: 99 % | RESPIRATION RATE: 14 BRPM | BODY MASS INDEX: 34.61 KG/M2 | HEART RATE: 71 BPM | SYSTOLIC BLOOD PRESSURE: 175 MMHG

## 2024-08-28 DIAGNOSIS — I35.9 AORTIC VALVE DISEASE: ICD-10-CM

## 2024-08-28 DIAGNOSIS — I71.21 ANEURYSM OF ASCENDING AORTA WITHOUT RUPTURE (H): ICD-10-CM

## 2024-08-28 LAB
ABO/RH(D): NORMAL
ALBUMIN SERPL BCG-MCNC: 4.2 G/DL (ref 3.5–5.2)
ALBUMIN UR-MCNC: NEGATIVE MG/DL
ALP SERPL-CCNC: 76 U/L (ref 40–150)
ALT SERPL W P-5'-P-CCNC: 22 U/L (ref 0–50)
ANION GAP SERPL CALCULATED.3IONS-SCNC: 9 MMOL/L (ref 7–15)
ANTIBODY SCREEN: NEGATIVE
APPEARANCE UR: CLEAR
APTT PPP: 33 SECONDS (ref 22–38)
AST SERPL W P-5'-P-CCNC: 18 U/L (ref 0–45)
ATRIAL RATE - MUSE: 66 BPM
BILIRUB SERPL-MCNC: 0.4 MG/DL
BILIRUB UR QL STRIP: NEGATIVE
BUN SERPL-MCNC: 20.8 MG/DL (ref 8–23)
CALCIUM SERPL-MCNC: 8.8 MG/DL (ref 8.8–10.4)
CHLORIDE SERPL-SCNC: 101 MMOL/L (ref 98–107)
COLOR UR AUTO: ABNORMAL
CREAT SERPL-MCNC: 0.72 MG/DL (ref 0.51–0.95)
DIASTOLIC BLOOD PRESSURE - MUSE: NORMAL MMHG
EGFRCR SERPLBLD CKD-EPI 2021: 89 ML/MIN/1.73M2
ERYTHROCYTE [DISTWIDTH] IN BLOOD BY AUTOMATED COUNT: 12.3 % (ref 10–15)
GLUCOSE SERPL-MCNC: 104 MG/DL (ref 70–99)
GLUCOSE UR STRIP-MCNC: NEGATIVE MG/DL
HBA1C MFR BLD: 5.5 %
HCO3 SERPL-SCNC: 26 MMOL/L (ref 22–29)
HCT VFR BLD AUTO: 43 % (ref 35–47)
HGB BLD-MCNC: 14.1 G/DL (ref 11.7–15.7)
HGB UR QL STRIP: NEGATIVE
INR PPP: 0.96 (ref 0.85–1.15)
INTERPRETATION ECG - MUSE: NORMAL
KETONES UR STRIP-MCNC: NEGATIVE MG/DL
LEUKOCYTE ESTERASE UR QL STRIP: ABNORMAL
MAGNESIUM SERPL-MCNC: 2.2 MG/DL (ref 1.7–2.3)
MCH RBC QN AUTO: 31.1 PG (ref 26.5–33)
MCHC RBC AUTO-ENTMCNC: 32.8 G/DL (ref 31.5–36.5)
MCV RBC AUTO: 95 FL (ref 78–100)
NITRATE UR QL: NEGATIVE
P AXIS - MUSE: 32 DEGREES
PH UR STRIP: 6 [PH] (ref 5–7)
PLATELET # BLD AUTO: 189 10E3/UL (ref 150–450)
POTASSIUM SERPL-SCNC: 4.5 MMOL/L (ref 3.4–5.3)
PR INTERVAL - MUSE: 164 MS
PREALB SERPL-MCNC: 24 MG/DL (ref 20–40)
PROT SERPL-MCNC: 6.5 G/DL (ref 6.4–8.3)
QRS DURATION - MUSE: 80 MS
QT - MUSE: 408 MS
QTC - MUSE: 427 MS
R AXIS - MUSE: 27 DEGREES
RBC # BLD AUTO: 4.53 10E6/UL (ref 3.8–5.2)
RBC URINE: <1 /HPF
SODIUM SERPL-SCNC: 136 MMOL/L (ref 135–145)
SP GR UR STRIP: 1.02 (ref 1–1.03)
SPECIMEN EXPIRATION DATE: NORMAL
SYSTOLIC BLOOD PRESSURE - MUSE: NORMAL MMHG
T AXIS - MUSE: 70 DEGREES
UROBILINOGEN UR STRIP-MCNC: <2 MG/DL
VENTRICULAR RATE- MUSE: 66 BPM
WBC # BLD AUTO: 4.6 10E3/UL (ref 4–11)
WBC URINE: 1 /HPF

## 2024-08-28 PROCEDURE — 80053 COMPREHEN METABOLIC PANEL: CPT | Performed by: SURGERY

## 2024-08-28 PROCEDURE — 83735 ASSAY OF MAGNESIUM: CPT | Performed by: SURGERY

## 2024-08-28 PROCEDURE — 85730 THROMBOPLASTIN TIME PARTIAL: CPT | Performed by: SURGERY

## 2024-08-28 PROCEDURE — 81001 URINALYSIS AUTO W/SCOPE: CPT | Performed by: SURGERY

## 2024-08-28 PROCEDURE — 84134 ASSAY OF PREALBUMIN: CPT | Performed by: SURGERY

## 2024-08-28 PROCEDURE — 71046 X-RAY EXAM CHEST 2 VIEWS: CPT

## 2024-08-28 PROCEDURE — 85610 PROTHROMBIN TIME: CPT | Performed by: SURGERY

## 2024-08-28 PROCEDURE — 85041 AUTOMATED RBC COUNT: CPT | Performed by: SURGERY

## 2024-08-28 PROCEDURE — 36415 COLL VENOUS BLD VENIPUNCTURE: CPT | Performed by: SURGERY

## 2024-08-28 PROCEDURE — 86900 BLOOD TYPING SEROLOGIC ABO: CPT | Performed by: SURGERY

## 2024-08-28 PROCEDURE — 83036 HEMOGLOBIN GLYCOSYLATED A1C: CPT | Performed by: SURGERY

## 2024-08-28 PROCEDURE — 93010 ELECTROCARDIOGRAM REPORT: CPT | Mod: HOP | Performed by: INTERNAL MEDICINE

## 2024-08-28 PROCEDURE — 93005 ELECTROCARDIOGRAM TRACING: CPT

## 2024-08-28 RX ORDER — SODIUM CHLORIDE, SODIUM LACTATE, POTASSIUM CHLORIDE, CALCIUM CHLORIDE 600; 310; 30; 20 MG/100ML; MG/100ML; MG/100ML; MG/100ML
INJECTION, SOLUTION INTRAVENOUS CONTINUOUS
Status: DISCONTINUED | OUTPATIENT
Start: 2024-08-28 | End: 2024-08-29 | Stop reason: HOSPADM

## 2024-08-28 RX ORDER — LIDOCAINE 40 MG/G
CREAM TOPICAL
Status: DISCONTINUED | OUTPATIENT
Start: 2024-08-28 | End: 2024-08-29 | Stop reason: HOSPADM

## 2024-08-28 RX ORDER — MAGNESIUM SULFATE 4 G/50ML
4 INJECTION INTRAVENOUS ONCE
Status: DISCONTINUED | OUTPATIENT
Start: 2024-08-28 | End: 2024-08-29 | Stop reason: HOSPADM

## 2024-08-28 RX ORDER — ACETAMINOPHEN 325 MG/1
975 TABLET ORAL ONCE
Status: DISCONTINUED | OUTPATIENT
Start: 2024-08-28 | End: 2024-08-29 | Stop reason: HOSPADM

## 2024-08-28 ASSESSMENT — ENCOUNTER SYMPTOMS: DYSRHYTHMIAS: 0

## 2024-08-28 NOTE — ANESTHESIA PREPROCEDURE EVALUATION
Anesthesia Pre-Procedure Evaluation    Patient: Maria Ines Molina   MRN: [id number] : 1955        Procedure : Procedure(s):  AORTIC VALVE REPLACEMENT AND ASCENDING AORTIC REPLACEMENT,  ANESTHESIA TRANSESOPHAGEAL ECHOCARDIOGRAM  Pre-Admission Testing       Past Medical History:   Diagnosis Date    Hyperlipidemia     Hypertension     Obesity     Thoracic aortic aneurysm (H24)       Past Surgical History:   Procedure Laterality Date    COLONOSCOPY  10/11/2012    Diverticulosis, otherwise negative    CV CORONARY ANGIOGRAM N/A 3/1/2024    Procedure: Coronary Angiogram;  Surgeon: Jose Cruz Omalley MD;  Location: [facility] CATH LAB CV      No Known Allergies   Social History     Tobacco Use    Smoking status: Former     Current packs/day: 0.00     Types: Cigarettes     Quit date: 1984     Years since quittin.6    Smokeless tobacco: Never   Substance Use Topics    Alcohol use: Not Currently      Wt Readings from Last 1 Encounters:   24 94.3 kg (208 lb)        Anesthesia Evaluation   Pt has had prior anesthetic.     No history of anesthetic complications       ROS/MED HX  ENT/Pulmonary:  - neg pulmonary ROS     Neurologic:  - neg neurologic ROS     Cardiovascular: Comment: EKG  Sinus rhythm  Nonspecific T wave abnormality  Abnormal ECG  When compared with ECG of 11-DEC-2023 18:50,  Nonspecific T wave abnormality now evident in Lateral leads  Confirmed by MATY GUTIÉRREZ, [facility] LOC:JN ([id number]) on 3/1/2024 7:57:57 AM     Echo 24:  Interpretation Summary    Left ventricular size, wall motion and function are normal. The ejection  fraction is 60-65%.  Normal right ventricle size and systolic function.  Severe valvular aortic stenosis.  There is mild (1+) aortic regurgitation.  Ascending Aorta dilatation is present(5 cm).  Compared to the prior study dated 23, there have been no changes.     Angiogram 3/1/24:  Coronary Angiography:  LEFT MAIN: Normal size, short vessel that bifurcates into left anterior  descending and left circumflex arteries. The left main has mild luminal irregularities.  LEFT ANTERIOR DESCENDING: Normal size vessel that gives rise to a large diagonal branch, multiple medium diagonal 2 and 3 branches, multiple septal perforators. The LAD wraps around the apex distally. The very proximal LAD has 30% stenosis followed by mild to moderate diffuse disease distally. The diagonal branches have mild to moderate diffuse disease.  LEFT CIRCUMFLEX: Nondominant vessel that gives rise to a large bifurcating OM branch and terminates into a small vessel distally. The left circumflex and its branches have mild diffuse disease.  RIGHT CORONARY ARTERY: Large dominant vessel that gives rise to right posterior descending and posterolateral system. The proximal to mid right coronary artery has a 50% ossific stenosis followed by mild diffuse disease of the right coronary artery and its branches distally.           (+)  hypertension- -  CAD -  - -                           valvular problems/murmurs type: AS and AI       (-) pacemaker, arrhythmias, pacemaker, ICD and wheezes   METS/Exercise Tolerance: >4 METS    Hematologic:  - neg hematologic  ROS     Musculoskeletal:   (+)  arthritis,             GI/Hepatic:  - neg GI/hepatic ROS     Renal/Genitourinary:  - neg Renal ROS     Endo:     (+)               Obesity,       Psychiatric/Substance Use:  - neg psychiatric ROS     Infectious Disease:  - neg infectious disease ROS     Malignancy:  - neg malignancy ROS     Other:  - neg other ROS          Physical Exam    Airway        Mallampati: II   TM distance: > 3 FB   Neck ROM: full   Mouth opening: > 3 cm    Respiratory Devices and Support         Dental  no notable dental history     (+) Minor Abnormalities - some fillings, tiny chips      Cardiovascular          Rhythm and rate: regular and normal   (+) murmur       Pulmonary           breath sounds clear to auscultation   (-) no wheezes        OUTSIDE LABS:  CBC:   Lab  "Results   Component Value Date    WBC 4.6 08/28/2024    WBC 9.4 12/11/2023    HGB 14.1 08/28/2024    HGB 14.6 12/11/2023    HCT 43.0 08/28/2024    HCT 44.5 12/11/2023     08/28/2024     12/11/2023     BMP:   Lab Results   Component Value Date     08/28/2024     (L) 12/11/2023    POTASSIUM 4.5 08/28/2024    POTASSIUM 4.1 12/11/2023    CHLORIDE 101 08/28/2024    CHLORIDE 96 (L) 12/11/2023    CO2 26 08/28/2024    CO2 29 12/11/2023    BUN 20.8 08/28/2024    BUN 17.0 12/11/2023    CR 0.72 08/28/2024    CR 0.82 12/11/2023     (H) 08/28/2024     (H) 12/11/2023     COAGS:   Lab Results   Component Value Date    PTT 33 08/28/2024    INR 0.96 08/28/2024     POC: No results found for: \"BGM\", \"HCG\", \"HCGS\"  HEPATIC:   Lab Results   Component Value Date    ALBUMIN 4.2 08/28/2024    PROTTOTAL 6.5 08/28/2024    ALT 22 08/28/2024    AST 18 08/28/2024    ALKPHOS 76 08/28/2024    BILITOTAL 0.4 08/28/2024     OTHER:   Lab Results   Component Value Date    A1C 5.5 08/28/2024    LAUREL 8.8 08/28/2024    MAG 2.2 08/28/2024       Anesthesia Plan    ASA Status:  4    NPO Status:  NPO Appropriate    Anesthesia Type: General.     - Airway: ETT   Induction: Intravenous, Propofol.   Maintenance: Balanced.   Techniques and Equipment:     - Airway: Video-Laryngoscope     - Lines/Monitors: 2nd IV, Arterial Line, Central Line, CVP, BIS, GREG (L Radius preferred per patient given history of traumatic injury to R arm)            GREG Absolute Contra-indication: NONE            GREG Relative Contra-indication: NONE     - Blood: Blood in Room, PRBC     - Drips/Meds: Steroid Stress Dose, Dexmed. infusion, Ketamine, Fentanyl, Phenylephrine, Epinephrine     Consents    Anesthesia Plan(s) and associated risks, benefits, and realistic alternatives discussed. Questions answered and patient/representative(s) expressed understanding.     - Discussed: Risks, Benefits and Alternatives for BOTH SEDATION and the PROCEDURE were " discussed     - Discussed with:  Patient       - Patient is DNR/DNI Status: No          Postoperative Care    Pain management: IV analgesics, Oral pain medications.   PONV prophylaxis: Ondansetron (or other 5HT-3), Dexamethasone or Solumedrol     Comments:    Other Comments: L Sergio preferred for A line per patient given history of traumatic injury to R arm    Discussed the risks and benefits of anesthesia with the patient.  Plan for a pre-induction arterial line.  Post-induction CVC w/2 lumen introducer  Epinephrine, phenylephrine and nicardipine gtt in line with norepinephrine and vasopressin readily available.  Dexmedetomidine 0.5 mcg/kg/min infusion and methadone 0.3 mg/kg IV bolus with induction.  GREG placement and monitoring.  Discussed potential needs for blood product transfusion.  Discussed plan to transport postoperatively to ICU intubated and sedated.     Patient history and physical exam reviewed. NPO status appropriate. Focused physical and history performed, pre-op evaluation updated. RBA discussed re: anesthesia and patients questions answered.                Mauro Kebede MD    I have reviewed the pertinent notes and labs in the chart from the past 30 days and (re)examined the patient.  Any updates or changes from those notes are reflected in this note.

## 2024-08-28 NOTE — PHARMACY-ADMISSION MEDICATION HISTORY
Pharmacist Admission Medication History    Admission medication history is complete. The information provided in this note is only as accurate as the sources available at the time of the update.    Information Source(s): Patient and CareEverywhere/SureScripts via in-person    Pertinent Information: Patient reported she was told to stop aspirin 81mg by her [facility] provider back in March; this is also when atorvastatin was stopped    Changes made to PTA medication list:  Added: None  Deleted: Aspirin, atorvastatin  Changed: None    Allergies reviewed with patient and updates made in EHR: yes    Medication History Completed By: PRAKASH MAYER RP 8/28/2024 8:35 AM      PTA Med List   Medication Sig Last Dose    lisinopril (ZESTRIL) 10 MG tablet TAKE 1 TABLET(10 MG) BY MOUTH DAILY     multivitamin w/minerals (THERA-VIT-M) tablet Take 1 tablet by mouth daily

## 2024-09-05 ENCOUNTER — HOSPITAL ENCOUNTER (INPATIENT)
Facility: HOSPITAL | Age: 69
LOS: 6 days | Discharge: HOME | DRG: 220 | End: 2024-09-11
Attending: SURGERY | Admitting: SURGERY
Payer: MEDICARE

## 2024-09-05 ENCOUNTER — APPOINTMENT (OUTPATIENT)
Dept: RADIOLOGY | Facility: HOSPITAL | Age: 69
DRG: 220 | End: 2024-09-05
Attending: PHYSICIAN ASSISTANT
Payer: MEDICARE

## 2024-09-05 ENCOUNTER — ANESTHESIA (OUTPATIENT)
Dept: SURGERY | Facility: HOSPITAL | Age: 69
DRG: 220 | End: 2024-09-05
Payer: MEDICARE

## 2024-09-05 DIAGNOSIS — Z95.2 S/P AVR (AORTIC VALVE REPLACEMENT): Primary | ICD-10-CM

## 2024-09-05 DIAGNOSIS — Z95.2 S/P AVR: Primary | ICD-10-CM

## 2024-09-05 DIAGNOSIS — I71.21 ANEURYSM OF ASCENDING AORTA WITHOUT RUPTURE (H): ICD-10-CM

## 2024-09-05 LAB
ALBUMIN SERPL BCG-MCNC: 3.6 G/DL (ref 3.5–5.2)
ALP SERPL-CCNC: 39 U/L (ref 40–150)
ALT SERPL W P-5'-P-CCNC: 19 U/L (ref 0–50)
ANION GAP SERPL CALCULATED.3IONS-SCNC: 12 MMOL/L (ref 7–15)
APTT PPP: 69 SECONDS (ref 22–38)
APTT PPP: 92 SECONDS (ref 22–38)
AST SERPL W P-5'-P-CCNC: 57 U/L (ref 0–45)
BASE EXCESS BLDA CALC-SCNC: -0.7 MMOL/L (ref -3–3)
BASE EXCESS BLDA CALC-SCNC: -1.2 MMOL/L (ref -3–3)
BASE EXCESS BLDA CALC-SCNC: -1.3 MMOL/L (ref -3–3)
BASE EXCESS BLDA CALC-SCNC: -3.5 MMOL/L (ref -3–3)
BASE EXCESS BLDA CALC-SCNC: -4 MMOL/L (ref -3–3)
BASE EXCESS BLDA CALC-SCNC: 0.7 MMOL/L (ref -3–3)
BASE EXCESS BLDV CALC-SCNC: 0.6 MMOL/L (ref -3–3)
BILIRUB SERPL-MCNC: 0.8 MG/DL
BLD PROD TYP BPU: NORMAL
BLD PROD TYP BPU: NORMAL
BLOOD COMPONENT TYPE: NORMAL
BLOOD COMPONENT TYPE: NORMAL
BUN SERPL-MCNC: 11.8 MG/DL (ref 8–23)
CA-I BLD-MCNC: 3.9 MG/DL (ref 4.4–5.2)
CA-I BLD-MCNC: 4 MG/DL (ref 4.4–5.2)
CA-I BLD-MCNC: 4 MG/DL (ref 4.4–5.2)
CA-I BLD-MCNC: 4.1 MG/DL (ref 4.4–5.2)
CA-I BLD-MCNC: 4.2 MG/DL (ref 4.4–5.2)
CA-I BLD-MCNC: 4.4 MG/DL (ref 4.4–5.2)
CA-I BLD-MCNC: 4.5 MG/DL (ref 4.4–5.2)
CA-I BLD-MCNC: 4.7 MG/DL (ref 4.4–5.2)
CALCIUM SERPL-MCNC: 7.9 MG/DL (ref 8.8–10.4)
CHLORIDE SERPL-SCNC: 110 MMOL/L (ref 98–107)
CODING SYSTEM: NORMAL
CODING SYSTEM: NORMAL
CPB POCT: NO
CREAT SERPL-MCNC: 0.68 MG/DL (ref 0.51–0.95)
CROSSMATCH: NORMAL
CROSSMATCH: NORMAL
EGFRCR SERPLBLD CKD-EPI 2021: >90 ML/MIN/1.73M2
ERYTHROCYTE [DISTWIDTH] IN BLOOD BY AUTOMATED COUNT: 12.4 % (ref 10–15)
ERYTHROCYTE [DISTWIDTH] IN BLOOD BY AUTOMATED COUNT: 12.5 % (ref 10–15)
FIBRINOGEN PPP-MCNC: 155 MG/DL (ref 170–510)
GLUCOSE BLD-MCNC: 109 MG/DL (ref 70–99)
GLUCOSE BLD-MCNC: 111 MG/DL (ref 70–99)
GLUCOSE BLD-MCNC: 137 MG/DL (ref 70–99)
GLUCOSE BLD-MCNC: 156 MG/DL (ref 70–99)
GLUCOSE BLD-MCNC: 164 MG/DL (ref 70–99)
GLUCOSE BLD-MCNC: 178 MG/DL (ref 70–99)
GLUCOSE BLDC GLUCOMTR-MCNC: 112 MG/DL (ref 70–99)
GLUCOSE BLDC GLUCOMTR-MCNC: 134 MG/DL (ref 70–99)
GLUCOSE BLDC GLUCOMTR-MCNC: 135 MG/DL (ref 70–99)
GLUCOSE BLDC GLUCOMTR-MCNC: 138 MG/DL (ref 70–99)
GLUCOSE BLDC GLUCOMTR-MCNC: 139 MG/DL (ref 70–99)
GLUCOSE BLDC GLUCOMTR-MCNC: 141 MG/DL (ref 70–99)
GLUCOSE BLDC GLUCOMTR-MCNC: 143 MG/DL (ref 70–99)
GLUCOSE BLDC GLUCOMTR-MCNC: 150 MG/DL (ref 70–99)
GLUCOSE BLDC GLUCOMTR-MCNC: 162 MG/DL (ref 70–99)
GLUCOSE BLDC GLUCOMTR-MCNC: 163 MG/DL (ref 70–99)
GLUCOSE BLDC GLUCOMTR-MCNC: 173 MG/DL (ref 70–99)
GLUCOSE SERPL-MCNC: 152 MG/DL (ref 70–99)
HCO3 BLDA-SCNC: 22 MMOL/L (ref 21–28)
HCO3 BLDA-SCNC: 22 MMOL/L (ref 21–28)
HCO3 BLDA-SCNC: 23 MMOL/L (ref 21–28)
HCO3 BLDA-SCNC: 23 MMOL/L (ref 21–28)
HCO3 BLDA-SCNC: 24 MMOL/L (ref 21–28)
HCO3 BLDA-SCNC: 25 MMOL/L (ref 21–28)
HCO3 BLDV-SCNC: 24 MMOL/L (ref 21–28)
HCO3 SERPL-SCNC: 22 MMOL/L (ref 22–29)
HCT VFR BLD AUTO: 25.9 % (ref 35–47)
HCT VFR BLD AUTO: 31.8 % (ref 35–47)
HCT VFR BLD CALC: 28 % (ref 35–47)
HGB BLD-MCNC: 10.2 G/DL (ref 11.7–15.7)
HGB BLD-MCNC: 10.5 G/DL (ref 11.7–15.7)
HGB BLD-MCNC: 10.7 G/DL (ref 11.7–15.7)
HGB BLD-MCNC: 10.7 G/DL (ref 11.7–15.7)
HGB BLD-MCNC: 10.9 G/DL (ref 11.7–15.7)
HGB BLD-MCNC: 13.2 G/DL (ref 11.7–15.7)
HGB BLD-MCNC: 8.6 G/DL (ref 11.7–15.7)
HGB BLD-MCNC: 8.6 G/DL (ref 11.7–15.7)
HGB BLD-MCNC: 9.5 G/DL (ref 11.7–15.7)
INR PPP: 1.48 (ref 0.85–1.15)
INR PPP: 1.68 (ref 0.85–1.15)
ISSUE DATE AND TIME: NORMAL
ISSUE DATE AND TIME: NORMAL
LACTATE BLD-SCNC: 1.4 MMOL/L (ref 0.7–2)
LACTATE BLD-SCNC: 1.5 MMOL/L (ref 0.7–2)
LACTATE BLD-SCNC: 1.6 MMOL/L (ref 0.7–2)
LACTATE BLD-SCNC: 1.6 MMOL/L (ref 0.7–2)
LACTATE BLD-SCNC: 2.6 MMOL/L (ref 0.7–2)
LACTATE BLD-SCNC: 3.8 MMOL/L (ref 0.7–2)
LACTATE SERPL-SCNC: 3.6 MMOL/L (ref 0.7–2)
MAGNESIUM SERPL-MCNC: 3.2 MG/DL (ref 1.7–2.3)
MCH RBC QN AUTO: 31.6 PG (ref 26.5–33)
MCH RBC QN AUTO: 31.7 PG (ref 26.5–33)
MCHC RBC AUTO-ENTMCNC: 33.2 G/DL (ref 31.5–36.5)
MCHC RBC AUTO-ENTMCNC: 33.6 G/DL (ref 31.5–36.5)
MCV RBC AUTO: 94 FL (ref 78–100)
MCV RBC AUTO: 95 FL (ref 78–100)
OXYHGB MFR BLDA: 98 % (ref 92–100)
OXYHGB MFR BLDA: 99 % (ref 92–100)
OXYHGB MFR BLDA: 99 % (ref 92–100)
OXYHGB MFR BLDV: 80 % (ref 70–75)
PCO2 BLDA: 39 MM HG (ref 35–45)
PCO2 BLDA: 43 MM HG (ref 35–45)
PCO2 BLDA: 45 MM HG (ref 35–45)
PCO2 BLDA: 45 MM HG (ref 35–45)
PCO2 BLDA: 48 MM HG (ref 35–45)
PCO2 BLDA: 53 MM HG (ref 35–45)
PCO2 BLDV: 52 MM HG (ref 40–50)
PH BLDA: 7.29 [PH] (ref 7.35–7.45)
PH BLDA: 7.33 [PH] (ref 7.35–7.45)
PH BLDA: 7.33 [PH] (ref 7.35–7.45)
PH BLDA: 7.35 [PH] (ref 7.35–7.45)
PH BLDA: 7.35 [PH] (ref 7.35–7.45)
PH BLDA: 7.37 [PH] (ref 7.35–7.45)
PH BLDV: 7.32 [PH] (ref 7.32–7.43)
PHOSPHATE SERPL-MCNC: 2.6 MG/DL (ref 2.5–4.5)
PLATELET # BLD AUTO: 107 10E3/UL (ref 150–450)
PLATELET # BLD AUTO: 121 10E3/UL (ref 150–450)
PO2 BLDA: 104 MM HG (ref 80–105)
PO2 BLDA: 263 MM HG (ref 80–105)
PO2 BLDA: 380 MM HG (ref 80–105)
PO2 BLDA: 417 MM HG (ref 80–105)
PO2 BLDA: 432 MM HG (ref 80–105)
PO2 BLDA: 447 MM HG (ref 80–105)
PO2 BLDV: 48 MM HG (ref 25–47)
POTASSIUM BLD-SCNC: 3.2 MMOL/L (ref 3.4–5.3)
POTASSIUM BLD-SCNC: 3.7 MMOL/L (ref 3.4–5.3)
POTASSIUM BLD-SCNC: 4.1 MMOL/L (ref 3.4–5.3)
POTASSIUM BLD-SCNC: 4.1 MMOL/L (ref 3.4–5.3)
POTASSIUM BLD-SCNC: 4.5 MMOL/L (ref 3.4–5.3)
POTASSIUM BLD-SCNC: 5.2 MMOL/L (ref 3.4–5.3)
POTASSIUM BLD-SCNC: 5.3 MMOL/L (ref 3.4–5.3)
POTASSIUM SERPL-SCNC: 3.9 MMOL/L (ref 3.4–5.3)
POTASSIUM SERPL-SCNC: 3.9 MMOL/L (ref 3.4–5.3)
POTASSIUM SERPL-SCNC: 4.1 MMOL/L (ref 3.4–5.3)
PROT SERPL-MCNC: 4.8 G/DL (ref 6.4–8.3)
RBC # BLD AUTO: 2.72 10E6/UL (ref 3.8–5.2)
RBC # BLD AUTO: 3.38 10E6/UL (ref 3.8–5.2)
SAO2 % BLDA: 100 % (ref 95–96)
SAO2 % BLDA: 98 % (ref 92–100)
SAO2 % BLDV: 81 % (ref 70–75)
SODIUM BLD-SCNC: 137 MMOL/L (ref 135–145)
SODIUM BLD-SCNC: 137 MMOL/L (ref 135–145)
SODIUM BLD-SCNC: 138 MMOL/L (ref 135–145)
SODIUM BLD-SCNC: 139 MMOL/L (ref 135–145)
SODIUM BLD-SCNC: 140 MMOL/L (ref 135–145)
SODIUM BLD-SCNC: 142 MMOL/L (ref 135–145)
SODIUM BLD-SCNC: 142 MMOL/L (ref 135–145)
SODIUM SERPL-SCNC: 144 MMOL/L (ref 135–145)
UNIT ABO/RH: NORMAL
UNIT ABO/RH: NORMAL
UNIT NUMBER: NORMAL
UNIT NUMBER: NORMAL
UNIT STATUS: NORMAL
UNIT STATUS: NORMAL
UNIT TYPE ISBT: 600
UNIT TYPE ISBT: 600
WBC # BLD AUTO: 14.3 10E3/UL (ref 4–11)
WBC # BLD AUTO: 15.6 10E3/UL (ref 4–11)

## 2024-09-05 PROCEDURE — 250N000011 HC RX IP 250 OP 636: Performed by: SURGERY

## 2024-09-05 PROCEDURE — 250N000009 HC RX 250: Performed by: SURGERY

## 2024-09-05 PROCEDURE — 33859 AS-AORT GRF F/DS OTH/THN DSJ: CPT | Performed by: SURGERY

## 2024-09-05 PROCEDURE — C1898 LEAD, PMKR, OTHER THAN TRANS: HCPCS | Performed by: SURGERY

## 2024-09-05 PROCEDURE — 85049 AUTOMATED PLATELET COUNT: CPT | Performed by: REGISTERED NURSE

## 2024-09-05 PROCEDURE — 94799 UNLISTED PULMONARY SVC/PX: CPT

## 2024-09-05 PROCEDURE — 200N000001 HC R&B ICU

## 2024-09-05 PROCEDURE — 85730 THROMBOPLASTIN TIME PARTIAL: CPT | Performed by: REGISTERED NURSE

## 2024-09-05 PROCEDURE — 83605 ASSAY OF LACTIC ACID: CPT | Performed by: PHYSICIAN ASSISTANT

## 2024-09-05 PROCEDURE — 83735 ASSAY OF MAGNESIUM: CPT | Performed by: PHYSICIAN ASSISTANT

## 2024-09-05 PROCEDURE — 82805 BLOOD GASES W/O2 SATURATION: CPT

## 2024-09-05 PROCEDURE — 410N000003 HC PER-PERFUSION 1ST 30 MIN: Performed by: SURGERY

## 2024-09-05 PROCEDURE — 94002 VENT MGMT INPAT INIT DAY: CPT

## 2024-09-05 PROCEDURE — 33268 EXCL LAA OPN OTH PX ANY METH: CPT | Performed by: SURGERY

## 2024-09-05 PROCEDURE — 02B70ZK EXCISION OF LEFT ATRIAL APPENDAGE, OPEN APPROACH: ICD-10-PCS | Performed by: SURGERY

## 2024-09-05 PROCEDURE — 410N000004: Performed by: SURGERY

## 2024-09-05 PROCEDURE — 250N000013 HC RX MED GY IP 250 OP 250 PS 637: Performed by: PHYSICIAN ASSISTANT

## 2024-09-05 PROCEDURE — 999N000009 HC STATISTIC AIRWAY CARE

## 2024-09-05 PROCEDURE — P9045 ALBUMIN (HUMAN), 5%, 250 ML: HCPCS | Performed by: REGISTERED NURSE

## 2024-09-05 PROCEDURE — 250N000011 HC RX IP 250 OP 636: Performed by: PHYSICIAN ASSISTANT

## 2024-09-05 PROCEDURE — 272N000202 HC AEROBIKA WITH MANOMETER

## 2024-09-05 PROCEDURE — 258N000003 HC RX IP 258 OP 636: Performed by: SURGERY

## 2024-09-05 PROCEDURE — 71045 X-RAY EXAM CHEST 1 VIEW: CPT

## 2024-09-05 PROCEDURE — 33361 REPLACE AORTIC VALVE PERQ: CPT | Performed by: ANESTHESIOLOGY

## 2024-09-05 PROCEDURE — 272N000001 HC OR GENERAL SUPPLY STERILE: Performed by: SURGERY

## 2024-09-05 PROCEDURE — 250N000013 HC RX MED GY IP 250 OP 250 PS 637: Performed by: SURGERY

## 2024-09-05 PROCEDURE — 82330 ASSAY OF CALCIUM: CPT | Performed by: SURGERY

## 2024-09-05 PROCEDURE — 02RX0JZ REPLACEMENT OF THORACIC AORTA, ASCENDING/ARCH WITH SYNTHETIC SUBSTITUTE, OPEN APPROACH: ICD-10-PCS | Performed by: SURGERY

## 2024-09-05 PROCEDURE — 258N000003 HC RX IP 258 OP 636: Performed by: PHYSICIAN ASSISTANT

## 2024-09-05 PROCEDURE — 360N000079 HC SURGERY LEVEL 6, PER MIN: Performed by: SURGERY

## 2024-09-05 PROCEDURE — 250N000011 HC RX IP 250 OP 636: Performed by: REGISTERED NURSE

## 2024-09-05 PROCEDURE — 82330 ASSAY OF CALCIUM: CPT | Performed by: PHYSICIAN ASSISTANT

## 2024-09-05 PROCEDURE — 999N000157 HC STATISTIC RCP TIME EA 10 MIN

## 2024-09-05 PROCEDURE — 85027 COMPLETE CBC AUTOMATED: CPT | Performed by: PHYSICIAN ASSISTANT

## 2024-09-05 PROCEDURE — 84132 ASSAY OF SERUM POTASSIUM: CPT | Performed by: PHYSICIAN ASSISTANT

## 2024-09-05 PROCEDURE — 272N000004 HC RX 272: Performed by: SURGERY

## 2024-09-05 PROCEDURE — 82803 BLOOD GASES ANY COMBINATION: CPT

## 2024-09-05 PROCEDURE — C1762 CONN TISS, HUMAN(INC FASCIA): HCPCS | Performed by: SURGERY

## 2024-09-05 PROCEDURE — 258N000003 HC RX IP 258 OP 636: Performed by: ANESTHESIOLOGY

## 2024-09-05 PROCEDURE — 370N000017 HC ANESTHESIA TECHNICAL FEE, PER MIN: Performed by: SURGERY

## 2024-09-05 PROCEDURE — 85730 THROMBOPLASTIN TIME PARTIAL: CPT | Performed by: PHYSICIAN ASSISTANT

## 2024-09-05 PROCEDURE — 250N000025 HC SEVOFLURANE, PER MIN: Performed by: SURGERY

## 2024-09-05 PROCEDURE — 84100 ASSAY OF PHOSPHORUS: CPT | Performed by: PHYSICIAN ASSISTANT

## 2024-09-05 PROCEDURE — 250N000011 HC RX IP 250 OP 636: Performed by: ANESTHESIOLOGY

## 2024-09-05 PROCEDURE — 02RF0JZ REPLACEMENT OF AORTIC VALVE WITH SYNTHETIC SUBSTITUTE, OPEN APPROACH: ICD-10-PCS | Performed by: SURGERY

## 2024-09-05 PROCEDURE — 999N000253 HC STATISTIC WEANING TRIALS

## 2024-09-05 PROCEDURE — 33361 REPLACE AORTIC VALVE PERQ: CPT | Performed by: REGISTERED NURSE

## 2024-09-05 PROCEDURE — P9016 RBC LEUKOCYTES REDUCED: HCPCS | Performed by: SURGERY

## 2024-09-05 PROCEDURE — 88311 DECALCIFY TISSUE: CPT | Mod: TC | Performed by: SURGERY

## 2024-09-05 PROCEDURE — 33405 REPLACEMENT AORTIC VALVE OPN: CPT | Performed by: SURGERY

## 2024-09-05 PROCEDURE — 85610 PROTHROMBIN TIME: CPT | Performed by: PHYSICIAN ASSISTANT

## 2024-09-05 PROCEDURE — 85610 PROTHROMBIN TIME: CPT | Performed by: REGISTERED NURSE

## 2024-09-05 PROCEDURE — 84132 ASSAY OF SERUM POTASSIUM: CPT | Performed by: SURGERY

## 2024-09-05 PROCEDURE — 86923 COMPATIBILITY TEST ELECTRIC: CPT | Performed by: SURGERY

## 2024-09-05 PROCEDURE — 250N000009 HC RX 250: Performed by: REGISTERED NURSE

## 2024-09-05 PROCEDURE — 5A1221Z PERFORMANCE OF CARDIAC OUTPUT, CONTINUOUS: ICD-10-PCS | Performed by: SURGERY

## 2024-09-05 PROCEDURE — 99100 ANES PT EXTEME AGE<1 YR&>70: CPT | Performed by: REGISTERED NURSE

## 2024-09-05 PROCEDURE — 250N000009 HC RX 250: Performed by: ANESTHESIOLOGY

## 2024-09-05 PROCEDURE — C1768 GRAFT, VASCULAR: HCPCS | Performed by: SURGERY

## 2024-09-05 PROCEDURE — 250N000013 HC RX MED GY IP 250 OP 250 PS 637: Performed by: ANESTHESIOLOGY

## 2024-09-05 PROCEDURE — 85384 FIBRINOGEN ACTIVITY: CPT | Performed by: REGISTERED NURSE

## 2024-09-05 PROCEDURE — 258N000003 HC RX IP 258 OP 636: Performed by: REGISTERED NURSE

## 2024-09-05 PROCEDURE — 99223 1ST HOSP IP/OBS HIGH 75: CPT | Mod: 24 | Performed by: INTERNAL MEDICINE

## 2024-09-05 PROCEDURE — 999N000259 HC STATISTIC EXTUBATION

## 2024-09-05 PROCEDURE — 999N000141 HC STATISTIC PRE-PROCEDURE NURSING ASSESSMENT: Performed by: SURGERY

## 2024-09-05 PROCEDURE — 82330 ASSAY OF CALCIUM: CPT

## 2024-09-05 PROCEDURE — 999N000055 HC STATISTIC END TITIAL CO2 MONITORING

## 2024-09-05 PROCEDURE — C1763 CONN TISS, NON-HUMAN: HCPCS | Performed by: SURGERY

## 2024-09-05 PROCEDURE — 84295 ASSAY OF SERUM SODIUM: CPT | Performed by: PHYSICIAN ASSISTANT

## 2024-09-05 DEVICE — IMPLANTABLE DEVICE: Type: IMPLANTABLE DEVICE | Site: AORTA | Status: FUNCTIONAL

## 2024-09-05 DEVICE — GRAFT PERICARDIUM 8X14CM PERI-GUARD PG0814: Type: IMPLANTABLE DEVICE | Site: CHEST | Status: FUNCTIONAL

## 2024-09-05 DEVICE — GELWEAVE GELATIN IMPREGNATED WOVEN VASCULAR PROSTHESIS STRAIGHT
Type: IMPLANTABLE DEVICE | Site: CHEST | Status: FUNCTIONAL
Brand: GELWEAVE™

## 2024-09-05 RX ORDER — SODIUM CHLORIDE 9 MG/ML
INJECTION, SOLUTION INTRAVENOUS CONTINUOUS PRN
Status: DISCONTINUED | OUTPATIENT
Start: 2024-09-05 | End: 2024-09-05

## 2024-09-05 RX ORDER — HYDROMORPHONE HCL IN WATER/PF 6 MG/30 ML
0.2 PATIENT CONTROLLED ANALGESIA SYRINGE INTRAVENOUS
Status: DISCONTINUED | OUTPATIENT
Start: 2024-09-05 | End: 2024-09-07

## 2024-09-05 RX ORDER — ONDANSETRON 2 MG/ML
4 INJECTION INTRAMUSCULAR; INTRAVENOUS EVERY 6 HOURS PRN
Status: DISCONTINUED | OUTPATIENT
Start: 2024-09-05 | End: 2024-09-11 | Stop reason: HOSPADM

## 2024-09-05 RX ORDER — HEPARIN SODIUM 1000 [USP'U]/ML
INJECTION, SOLUTION INTRAVENOUS; SUBCUTANEOUS PRN
Status: DISCONTINUED | OUTPATIENT
Start: 2024-09-05 | End: 2024-09-05

## 2024-09-05 RX ORDER — NITROGLYCERIN 10 MG/100ML
INJECTION INTRAVENOUS PRN
Status: DISCONTINUED | OUTPATIENT
Start: 2024-09-05 | End: 2024-09-05

## 2024-09-05 RX ORDER — CEFAZOLIN SODIUM/WATER 2 G/20 ML
2 SYRINGE (ML) INTRAVENOUS
Status: COMPLETED | OUTPATIENT
Start: 2024-09-05 | End: 2024-09-05

## 2024-09-05 RX ORDER — ACETAMINOPHEN 650 MG/1
650 SUPPOSITORY RECTAL EVERY 8 HOURS
Status: COMPLETED | OUTPATIENT
Start: 2024-09-05 | End: 2024-09-08

## 2024-09-05 RX ORDER — ONDANSETRON 4 MG/1
4 TABLET, ORALLY DISINTEGRATING ORAL EVERY 6 HOURS PRN
Status: DISCONTINUED | OUTPATIENT
Start: 2024-09-05 | End: 2024-09-11 | Stop reason: HOSPADM

## 2024-09-05 RX ORDER — CYCLOBENZAPRINE HCL 10 MG
10 TABLET ORAL 3 TIMES DAILY PRN
Status: DISCONTINUED | OUTPATIENT
Start: 2024-09-05 | End: 2024-09-11 | Stop reason: HOSPADM

## 2024-09-05 RX ORDER — LIDOCAINE 40 MG/G
CREAM TOPICAL
Status: DISCONTINUED | OUTPATIENT
Start: 2024-09-05 | End: 2024-09-05

## 2024-09-05 RX ORDER — MAGNESIUM SULFATE 4 G/50ML
4 INJECTION INTRAVENOUS ONCE
Status: COMPLETED | OUTPATIENT
Start: 2024-09-05 | End: 2024-09-05

## 2024-09-05 RX ORDER — CEFAZOLIN SODIUM/WATER 2 G/20 ML
2 SYRINGE (ML) INTRAVENOUS SEE ADMIN INSTRUCTIONS
Status: DISCONTINUED | OUTPATIENT
Start: 2024-09-05 | End: 2024-09-05

## 2024-09-05 RX ORDER — MULTIPLE VITAMINS W/ MINERALS TAB 9MG-400MCG
1 TAB ORAL DAILY
Status: DISCONTINUED | OUTPATIENT
Start: 2024-09-06 | End: 2024-09-11 | Stop reason: HOSPADM

## 2024-09-05 RX ORDER — PROTAMINE SULFATE 10 MG/ML
INJECTION, SOLUTION INTRAVENOUS PRN
Status: DISCONTINUED | OUTPATIENT
Start: 2024-09-05 | End: 2024-09-05

## 2024-09-05 RX ORDER — ASPIRIN 300 MG/1
300 SUPPOSITORY RECTAL DAILY
Status: DISCONTINUED | OUTPATIENT
Start: 2024-09-06 | End: 2024-09-06

## 2024-09-05 RX ORDER — DEXMEDETOMIDINE HYDROCHLORIDE 4 UG/ML
.2-1.2 INJECTION, SOLUTION INTRAVENOUS CONTINUOUS
Status: DISCONTINUED | OUTPATIENT
Start: 2024-09-05 | End: 2024-09-05

## 2024-09-05 RX ORDER — CALCIUM GLUCONATE 20 MG/ML
1 INJECTION, SOLUTION INTRAVENOUS
Status: DISCONTINUED | OUTPATIENT
Start: 2024-09-05 | End: 2024-09-11 | Stop reason: HOSPADM

## 2024-09-05 RX ORDER — ASPIRIN 81 MG/1
81 TABLET, CHEWABLE ORAL DAILY
Status: DISCONTINUED | OUTPATIENT
Start: 2024-09-06 | End: 2024-09-11 | Stop reason: HOSPADM

## 2024-09-05 RX ORDER — OXYCODONE HYDROCHLORIDE 5 MG/1
5 TABLET ORAL EVERY 4 HOURS PRN
Status: DISCONTINUED | OUTPATIENT
Start: 2024-09-05 | End: 2024-09-09

## 2024-09-05 RX ORDER — CALCIUM GLUCONATE 20 MG/ML
2 INJECTION, SOLUTION INTRAVENOUS
Status: DISCONTINUED | OUTPATIENT
Start: 2024-09-05 | End: 2024-09-11 | Stop reason: HOSPADM

## 2024-09-05 RX ORDER — ACETAMINOPHEN 325 MG/1
650 TABLET ORAL EVERY 4 HOURS PRN
Status: DISCONTINUED | OUTPATIENT
Start: 2024-09-08 | End: 2024-09-11 | Stop reason: HOSPADM

## 2024-09-05 RX ORDER — PHENYLEPHRINE HCL IN 0.9% NACL 50MG/250ML
.1-4 PLASTIC BAG, INJECTION (ML) INTRAVENOUS CONTINUOUS PRN
Status: DISCONTINUED | OUTPATIENT
Start: 2024-09-05 | End: 2024-09-06

## 2024-09-05 RX ORDER — NALOXONE HYDROCHLORIDE 0.4 MG/ML
0.2 INJECTION, SOLUTION INTRAMUSCULAR; INTRAVENOUS; SUBCUTANEOUS
Status: DISCONTINUED | OUTPATIENT
Start: 2024-09-05 | End: 2024-09-11 | Stop reason: HOSPADM

## 2024-09-05 RX ORDER — NALOXONE HYDROCHLORIDE 0.4 MG/ML
0.4 INJECTION, SOLUTION INTRAMUSCULAR; INTRAVENOUS; SUBCUTANEOUS
Status: DISCONTINUED | OUTPATIENT
Start: 2024-09-05 | End: 2024-09-11 | Stop reason: HOSPADM

## 2024-09-05 RX ORDER — LIDOCAINE HYDROCHLORIDE 10 MG/ML
INJECTION, SOLUTION INFILTRATION; PERINEURAL
Status: COMPLETED | OUTPATIENT
Start: 2024-09-05 | End: 2024-09-05

## 2024-09-05 RX ORDER — CHLORHEXIDINE GLUCONATE ORAL RINSE 1.2 MG/ML
10 SOLUTION DENTAL ONCE
Status: COMPLETED | OUTPATIENT
Start: 2024-09-05 | End: 2024-09-05

## 2024-09-05 RX ORDER — PHENYLEPHRINE HCL IN 0.9% NACL 50MG/250ML
.1-6 PLASTIC BAG, INJECTION (ML) INTRAVENOUS CONTINUOUS
Status: DISCONTINUED | OUTPATIENT
Start: 2024-09-05 | End: 2024-09-05

## 2024-09-05 RX ORDER — PROCHLORPERAZINE MALEATE 5 MG
5 TABLET ORAL EVERY 6 HOURS PRN
Status: DISCONTINUED | OUTPATIENT
Start: 2024-09-05 | End: 2024-09-06

## 2024-09-05 RX ORDER — METHADONE HYDROCHLORIDE 10 MG/ML
20 INJECTION, SOLUTION INTRAMUSCULAR; INTRAVENOUS; SUBCUTANEOUS ONCE
Status: COMPLETED | OUTPATIENT
Start: 2024-09-05 | End: 2024-09-05

## 2024-09-05 RX ORDER — FENTANYL CITRATE 50 UG/ML
INJECTION, SOLUTION INTRAMUSCULAR; INTRAVENOUS PRN
Status: DISCONTINUED | OUTPATIENT
Start: 2024-09-05 | End: 2024-09-05

## 2024-09-05 RX ORDER — POLYETHYLENE GLYCOL 3350 17 G/17G
17 POWDER, FOR SOLUTION ORAL DAILY
Status: DISCONTINUED | OUTPATIENT
Start: 2024-09-06 | End: 2024-09-11 | Stop reason: HOSPADM

## 2024-09-05 RX ORDER — PROPOFOL 10 MG/ML
INJECTION, EMULSION INTRAVENOUS PRN
Status: DISCONTINUED | OUTPATIENT
Start: 2024-09-05 | End: 2024-09-05

## 2024-09-05 RX ORDER — DEXMEDETOMIDINE HYDROCHLORIDE 4 UG/ML
.1-1.2 INJECTION, SOLUTION INTRAVENOUS CONTINUOUS
Status: DISCONTINUED | OUTPATIENT
Start: 2024-09-05 | End: 2024-09-06

## 2024-09-05 RX ORDER — CEFAZOLIN SODIUM 2 G/100ML
2 INJECTION, SOLUTION INTRAVENOUS EVERY 8 HOURS
Status: COMPLETED | OUTPATIENT
Start: 2024-09-05 | End: 2024-09-06

## 2024-09-05 RX ORDER — NOREPINEPHRINE BITARTRATE 0.02 MG/ML
.01-.1 INJECTION, SOLUTION INTRAVENOUS CONTINUOUS
Status: DISCONTINUED | OUTPATIENT
Start: 2024-09-05 | End: 2024-09-05

## 2024-09-05 RX ORDER — CALCIUM CHLORIDE 100 MG/ML
INJECTION INTRAVENOUS; INTRAVENTRICULAR PRN
Status: DISCONTINUED | OUTPATIENT
Start: 2024-09-05 | End: 2024-09-05

## 2024-09-05 RX ORDER — HEPARIN SODIUM 5000 [USP'U]/.5ML
5000 INJECTION, SOLUTION INTRAVENOUS; SUBCUTANEOUS EVERY 8 HOURS
Status: DISCONTINUED | OUTPATIENT
Start: 2024-09-06 | End: 2024-09-10

## 2024-09-05 RX ORDER — ACETAMINOPHEN 325 MG/1
975 TABLET ORAL EVERY 8 HOURS
Status: DISCONTINUED | OUTPATIENT
Start: 2024-09-05 | End: 2024-09-11 | Stop reason: HOSPADM

## 2024-09-05 RX ORDER — SODIUM CHLORIDE, SODIUM LACTATE, POTASSIUM CHLORIDE, CALCIUM CHLORIDE 600; 310; 30; 20 MG/100ML; MG/100ML; MG/100ML; MG/100ML
INJECTION, SOLUTION INTRAVENOUS CONTINUOUS
Status: DISCONTINUED | OUTPATIENT
Start: 2024-09-05 | End: 2024-09-05

## 2024-09-05 RX ORDER — OXYCODONE HYDROCHLORIDE 5 MG/1
10 TABLET ORAL EVERY 4 HOURS PRN
Status: DISCONTINUED | OUTPATIENT
Start: 2024-09-05 | End: 2024-09-09

## 2024-09-05 RX ORDER — AMOXICILLIN 250 MG
1 CAPSULE ORAL 2 TIMES DAILY
Status: DISCONTINUED | OUTPATIENT
Start: 2024-09-05 | End: 2024-09-11 | Stop reason: HOSPADM

## 2024-09-05 RX ORDER — HYDROMORPHONE HCL IN WATER/PF 6 MG/30 ML
0.4 PATIENT CONTROLLED ANALGESIA SYRINGE INTRAVENOUS
Status: DISCONTINUED | OUTPATIENT
Start: 2024-09-05 | End: 2024-09-07

## 2024-09-05 RX ORDER — POTASSIUM CHLORIDE 7.45 MG/ML
10 INJECTION INTRAVENOUS
Status: COMPLETED | OUTPATIENT
Start: 2024-09-05 | End: 2024-09-05

## 2024-09-05 RX ORDER — LIDOCAINE 4 G/G
1-2 PATCH TOPICAL EVERY 24 HOURS
Status: DISCONTINUED | OUTPATIENT
Start: 2024-09-05 | End: 2024-09-11 | Stop reason: HOSPADM

## 2024-09-05 RX ORDER — LIDOCAINE HYDROCHLORIDE 10 MG/ML
INJECTION, SOLUTION INFILTRATION; PERINEURAL PRN
Status: DISCONTINUED | OUTPATIENT
Start: 2024-09-05 | End: 2024-09-05

## 2024-09-05 RX ORDER — DEXTROSE MONOHYDRATE 25 G/50ML
25-50 INJECTION, SOLUTION INTRAVENOUS
Status: DISCONTINUED | OUTPATIENT
Start: 2024-09-05 | End: 2024-09-11 | Stop reason: HOSPADM

## 2024-09-05 RX ORDER — METHADONE HYDROCHLORIDE 10 MG/ML
INJECTION, SOLUTION INTRAMUSCULAR; INTRAVENOUS; SUBCUTANEOUS
Status: DISCONTINUED
Start: 2024-09-05 | End: 2024-09-05 | Stop reason: HOSPADM

## 2024-09-05 RX ORDER — MAGNESIUM HYDROXIDE 1200 MG/15ML
LIQUID ORAL PRN
Status: DISCONTINUED | OUTPATIENT
Start: 2024-09-05 | End: 2024-09-05 | Stop reason: HOSPADM

## 2024-09-05 RX ORDER — KETOROLAC TROMETHAMINE 15 MG/ML
15 INJECTION, SOLUTION INTRAMUSCULAR; INTRAVENOUS EVERY 6 HOURS PRN
Status: DISCONTINUED | OUTPATIENT
Start: 2024-09-05 | End: 2024-09-08

## 2024-09-05 RX ORDER — VANCOMYCIN HYDROCHLORIDE 1 G/20ML
INJECTION, POWDER, LYOPHILIZED, FOR SOLUTION INTRAVENOUS PRN
Status: DISCONTINUED | OUTPATIENT
Start: 2024-09-05 | End: 2024-09-05 | Stop reason: HOSPADM

## 2024-09-05 RX ORDER — PROTAMINE SULFATE 10 MG/ML
50 INJECTION, SOLUTION INTRAVENOUS ONCE
Status: COMPLETED | OUTPATIENT
Start: 2024-09-05 | End: 2024-09-05

## 2024-09-05 RX ORDER — PANTOPRAZOLE SODIUM 40 MG/1
40 TABLET, DELAYED RELEASE ORAL DAILY
Status: DISCONTINUED | OUTPATIENT
Start: 2024-09-06 | End: 2024-09-11 | Stop reason: HOSPADM

## 2024-09-05 RX ORDER — HYDRALAZINE HYDROCHLORIDE 20 MG/ML
10 INJECTION INTRAMUSCULAR; INTRAVENOUS EVERY 30 MIN PRN
Status: DISCONTINUED | OUTPATIENT
Start: 2024-09-05 | End: 2024-09-07

## 2024-09-05 RX ORDER — BISACODYL 10 MG
10 SUPPOSITORY, RECTAL RECTAL DAILY PRN
Status: DISCONTINUED | OUTPATIENT
Start: 2024-09-05 | End: 2024-09-10

## 2024-09-05 RX ORDER — SODIUM CHLORIDE, SODIUM GLUCONATE, SODIUM ACETATE, POTASSIUM CHLORIDE AND MAGNESIUM CHLORIDE 526; 502; 368; 37; 30 MG/100ML; MG/100ML; MG/100ML; MG/100ML; MG/100ML
1000 INJECTION, SOLUTION INTRAVENOUS
Status: DISCONTINUED | OUTPATIENT
Start: 2024-09-05 | End: 2024-09-05

## 2024-09-05 RX ORDER — LIDOCAINE HYDROCHLORIDE 20 MG/ML
INJECTION, SOLUTION INFILTRATION; PERINEURAL
Status: COMPLETED | OUTPATIENT
Start: 2024-09-05 | End: 2024-09-05

## 2024-09-05 RX ORDER — NICOTINE POLACRILEX 4 MG
15-30 LOZENGE BUCCAL
Status: DISCONTINUED | OUTPATIENT
Start: 2024-09-05 | End: 2024-09-11 | Stop reason: HOSPADM

## 2024-09-05 RX ADMIN — Medication 0.5 MCG/KG/MIN: at 07:43

## 2024-09-05 RX ADMIN — SODIUM CHLORIDE, POTASSIUM CHLORIDE, SODIUM LACTATE AND CALCIUM CHLORIDE 250 ML: 600; 310; 30; 20 INJECTION, SOLUTION INTRAVENOUS at 13:45

## 2024-09-05 RX ADMIN — PROTAMINE SULFATE 250 MG: 10 INJECTION, SOLUTION INTRAVENOUS at 11:38

## 2024-09-05 RX ADMIN — PHENYLEPHRINE HYDROCHLORIDE 100 MCG: 10 INJECTION INTRAVENOUS at 07:45

## 2024-09-05 RX ADMIN — PHENYLEPHRINE HYDROCHLORIDE 100 MCG: 10 INJECTION INTRAVENOUS at 07:51

## 2024-09-05 RX ADMIN — SODIUM CHLORIDE, POTASSIUM CHLORIDE, SODIUM LACTATE AND CALCIUM CHLORIDE: 600; 310; 30; 20 INJECTION, SOLUTION INTRAVENOUS at 12:19

## 2024-09-05 RX ADMIN — ONDANSETRON 4 MG: 2 INJECTION INTRAMUSCULAR; INTRAVENOUS at 18:42

## 2024-09-05 RX ADMIN — PHENYLEPHRINE HYDROCHLORIDE 200 MCG: 10 INJECTION INTRAVENOUS at 07:59

## 2024-09-05 RX ADMIN — LIDOCAINE HYDROCHLORIDE 2 ML: 10 INJECTION, SOLUTION INFILTRATION; PERINEURAL at 07:50

## 2024-09-05 RX ADMIN — PROTAMINE SULFATE 50 MG: 10 INJECTION, SOLUTION INTRAVENOUS at 13:58

## 2024-09-05 RX ADMIN — PHENYLEPHRINE HYDROCHLORIDE 100 MCG: 10 INJECTION INTRAVENOUS at 08:39

## 2024-09-05 RX ADMIN — SODIUM CHLORIDE: 9 INJECTION, SOLUTION INTRAVENOUS at 12:19

## 2024-09-05 RX ADMIN — PHENYLEPHRINE HYDROCHLORIDE 100 MCG: 10 INJECTION INTRAVENOUS at 07:40

## 2024-09-05 RX ADMIN — SODIUM CHLORIDE, POTASSIUM CHLORIDE, SODIUM LACTATE AND CALCIUM CHLORIDE: 600; 310; 30; 20 INJECTION, SOLUTION INTRAVENOUS at 11:25

## 2024-09-05 RX ADMIN — ACETAMINOPHEN 650 MG: 650 SUPPOSITORY RECTAL at 13:29

## 2024-09-05 RX ADMIN — SODIUM CHLORIDE, POTASSIUM CHLORIDE, SODIUM LACTATE AND CALCIUM CHLORIDE 250 ML: 600; 310; 30; 20 INJECTION, SOLUTION INTRAVENOUS at 17:02

## 2024-09-05 RX ADMIN — PROPOFOL 30 MG: 10 INJECTION, EMULSION INTRAVENOUS at 07:36

## 2024-09-05 RX ADMIN — LIDOCAINE HYDROCHLORIDE 50 MG: 10 INJECTION, SOLUTION INFILTRATION; PERINEURAL at 07:34

## 2024-09-05 RX ADMIN — HEPARIN SODIUM 25000 UNITS: 1000 INJECTION INTRAVENOUS; SUBCUTANEOUS at 08:35

## 2024-09-05 RX ADMIN — NITROGLYCERIN 40 MCG: 10 INJECTION INTRAVENOUS at 08:49

## 2024-09-05 RX ADMIN — DEXMEDETOMIDINE HYDROCHLORIDE 0.5 MCG/KG/HR: 400 INJECTION INTRAVENOUS at 08:15

## 2024-09-05 RX ADMIN — MAGNESIUM SULFATE HEPTAHYDRATE 4 G: 80 INJECTION, SOLUTION INTRAVENOUS at 06:56

## 2024-09-05 RX ADMIN — LIDOCAINE 2 PATCH: 4 PATCH TOPICAL at 13:29

## 2024-09-05 RX ADMIN — SODIUM CHLORIDE, POTASSIUM CHLORIDE, SODIUM LACTATE AND CALCIUM CHLORIDE 250 ML: 600; 310; 30; 20 INJECTION, SOLUTION INTRAVENOUS at 13:00

## 2024-09-05 RX ADMIN — ROCURONIUM BROMIDE 20 MG: 50 INJECTION, SOLUTION INTRAVENOUS at 09:15

## 2024-09-05 RX ADMIN — HYDROMORPHONE HYDROCHLORIDE 0.4 MG: 0.2 INJECTION, SOLUTION INTRAMUSCULAR; INTRAVENOUS; SUBCUTANEOUS at 13:16

## 2024-09-05 RX ADMIN — PHENYLEPHRINE HYDROCHLORIDE 100 MCG: 10 INJECTION INTRAVENOUS at 11:39

## 2024-09-05 RX ADMIN — ROCURONIUM BROMIDE 70 MG: 50 INJECTION, SOLUTION INTRAVENOUS at 07:34

## 2024-09-05 RX ADMIN — SODIUM CHLORIDE, POTASSIUM CHLORIDE, SODIUM LACTATE AND CALCIUM CHLORIDE 250 ML: 600; 310; 30; 20 INJECTION, SOLUTION INTRAVENOUS at 13:15

## 2024-09-05 RX ADMIN — SODIUM CHLORIDE 1500 MG: 9 INJECTION, SOLUTION INTRAVENOUS at 19:18

## 2024-09-05 RX ADMIN — PHENYLEPHRINE HYDROCHLORIDE 100 MCG: 10 INJECTION INTRAVENOUS at 07:55

## 2024-09-05 RX ADMIN — Medication 2 G: at 12:00

## 2024-09-05 RX ADMIN — NITROGLYCERIN 40 MCG: 10 INJECTION INTRAVENOUS at 08:44

## 2024-09-05 RX ADMIN — EPINEPHRINE 0.02 MCG/KG/MIN: 1 INJECTION INTRAMUSCULAR; INTRAVENOUS; SUBCUTANEOUS at 11:30

## 2024-09-05 RX ADMIN — SENNOSIDES AND DOCUSATE SODIUM 1 TABLET: 8.6; 5 TABLET ORAL at 21:37

## 2024-09-05 RX ADMIN — LIDOCAINE HYDROCHLORIDE 2 ML: 20 INJECTION, SOLUTION INFILTRATION; PERINEURAL at 07:50

## 2024-09-05 RX ADMIN — SODIUM CHLORIDE, POTASSIUM CHLORIDE, SODIUM LACTATE AND CALCIUM CHLORIDE 250 ML: 600; 310; 30; 20 INJECTION, SOLUTION INTRAVENOUS at 13:30

## 2024-09-05 RX ADMIN — ONDANSETRON 4 MG: 2 INJECTION INTRAMUSCULAR; INTRAVENOUS at 12:55

## 2024-09-05 RX ADMIN — POTASSIUM CHLORIDE 10 MEQ: 7.46 INJECTION, SOLUTION INTRAVENOUS at 15:21

## 2024-09-05 RX ADMIN — ALBUMIN HUMAN: 0.05 INJECTION, SOLUTION INTRAVENOUS at 08:00

## 2024-09-05 RX ADMIN — CALCIUM GLUCONATE 1 G: 20 INJECTION, SOLUTION INTRAVENOUS at 13:58

## 2024-09-05 RX ADMIN — ROCURONIUM BROMIDE 30 MG: 50 INJECTION, SOLUTION INTRAVENOUS at 08:25

## 2024-09-05 RX ADMIN — NITROGLYCERIN 40 MCG: 10 INJECTION INTRAVENOUS at 08:45

## 2024-09-05 RX ADMIN — POTASSIUM CHLORIDE 10 MEQ: 7.46 INJECTION, SOLUTION INTRAVENOUS at 14:22

## 2024-09-05 RX ADMIN — KETOROLAC TROMETHAMINE 15 MG: 15 INJECTION, SOLUTION INTRAMUSCULAR; INTRAVENOUS at 19:32

## 2024-09-05 RX ADMIN — ACETAMINOPHEN 975 MG: 325 TABLET ORAL at 21:37

## 2024-09-05 RX ADMIN — Medication 2 G: at 08:03

## 2024-09-05 RX ADMIN — CEFAZOLIN SODIUM 2 G: 2 INJECTION, SOLUTION INTRAVENOUS at 16:54

## 2024-09-05 RX ADMIN — SODIUM CHLORIDE, POTASSIUM CHLORIDE, SODIUM LACTATE AND CALCIUM CHLORIDE: 600; 310; 30; 20 INJECTION, SOLUTION INTRAVENOUS at 07:21

## 2024-09-05 RX ADMIN — PHENYLEPHRINE HYDROCHLORIDE 200 MCG: 10 INJECTION INTRAVENOUS at 08:12

## 2024-09-05 RX ADMIN — SODIUM CHLORIDE, POTASSIUM CHLORIDE, SODIUM LACTATE AND CALCIUM CHLORIDE 250 ML: 600; 310; 30; 20 INJECTION, SOLUTION INTRAVENOUS at 17:18

## 2024-09-05 RX ADMIN — PHENYLEPHRINE HYDROCHLORIDE 100 MCG: 10 INJECTION INTRAVENOUS at 07:34

## 2024-09-05 RX ADMIN — FENTANYL CITRATE 50 MCG: 50 INJECTION INTRAMUSCULAR; INTRAVENOUS at 07:26

## 2024-09-05 RX ADMIN — METOPROLOL TARTRATE 12.5 MG: 25 TABLET, FILM COATED ORAL at 06:48

## 2024-09-05 RX ADMIN — PROPOFOL 50 MG: 10 INJECTION, EMULSION INTRAVENOUS at 07:34

## 2024-09-05 RX ADMIN — CEFAZOLIN SODIUM 2 G: 2 INJECTION, SOLUTION INTRAVENOUS at 23:15

## 2024-09-05 RX ADMIN — SODIUM CHLORIDE: 9 INJECTION, SOLUTION INTRAVENOUS at 07:40

## 2024-09-05 RX ADMIN — OXYCODONE HYDROCHLORIDE 5 MG: 5 TABLET ORAL at 17:52

## 2024-09-05 RX ADMIN — SODIUM CHLORIDE, POTASSIUM CHLORIDE, SODIUM LACTATE AND CALCIUM CHLORIDE 250 ML: 600; 310; 30; 20 INJECTION, SOLUTION INTRAVENOUS at 16:00

## 2024-09-05 RX ADMIN — SODIUM CHLORIDE 1500 MG: 9 INJECTION, SOLUTION INTRAVENOUS at 06:51

## 2024-09-05 RX ADMIN — ROCURONIUM BROMIDE 10 MG: 50 INJECTION, SOLUTION INTRAVENOUS at 10:00

## 2024-09-05 RX ADMIN — SODIUM CHLORIDE, POTASSIUM CHLORIDE, SODIUM LACTATE AND CALCIUM CHLORIDE 250 ML: 600; 310; 30; 20 INJECTION, SOLUTION INTRAVENOUS at 23:56

## 2024-09-05 RX ADMIN — CHLORHEXIDINE GLUCONATE 0.12% ORAL RINSE 10 ML: 1.2 LIQUID ORAL at 06:50

## 2024-09-05 RX ADMIN — SODIUM CHLORIDE, POTASSIUM CHLORIDE, SODIUM LACTATE AND CALCIUM CHLORIDE 250 ML: 600; 310; 30; 20 INJECTION, SOLUTION INTRAVENOUS at 16:15

## 2024-09-05 RX ADMIN — Medication 20 MG: at 07:54

## 2024-09-05 RX ADMIN — CALCIUM CHLORIDE INJECTION 500 MG: 100 INJECTION, SOLUTION INTRAVENOUS at 12:29

## 2024-09-05 RX ADMIN — AMINOCAPROIC ACID 5 G: 250 INJECTION, SOLUTION INTRAVENOUS at 08:15

## 2024-09-05 RX ADMIN — NITROGLYCERIN 40 MCG: 10 INJECTION INTRAVENOUS at 08:41

## 2024-09-05 RX ADMIN — AMINOCAPROIC ACID 1 G/HR: 250 INJECTION, SOLUTION INTRAVENOUS at 08:35

## 2024-09-05 RX ADMIN — FENTANYL CITRATE 50 MCG: 50 INJECTION INTRAMUSCULAR; INTRAVENOUS at 07:24

## 2024-09-05 RX ADMIN — INSULIN HUMAN 3 UNITS/HR: 1 INJECTION, SOLUTION INTRAVENOUS at 09:34

## 2024-09-05 RX ADMIN — SUGAMMADEX 190 MG: 100 INJECTION, SOLUTION INTRAVENOUS at 12:55

## 2024-09-05 ASSESSMENT — ACTIVITIES OF DAILY LIVING (ADL)
ADLS_ACUITY_SCORE: 35
ADLS_ACUITY_SCORE: 44
ADLS_ACUITY_SCORE: 41
ADLS_ACUITY_SCORE: 35
ADLS_ACUITY_SCORE: 41
ADLS_ACUITY_SCORE: 31
ADLS_ACUITY_SCORE: 35
ADLS_ACUITY_SCORE: 35
ADLS_ACUITY_SCORE: 41
ADLS_ACUITY_SCORE: 41
ADLS_ACUITY_SCORE: 35
ADLS_ACUITY_SCORE: 29
ADLS_ACUITY_SCORE: 35
ADLS_ACUITY_SCORE: 35
ADLS_ACUITY_SCORE: 41
ADLS_ACUITY_SCORE: 35

## 2024-09-05 NOTE — ANESTHESIA PROCEDURE NOTES
Arterial Line Procedure Note    Pre-Procedure   Staff -        Anesthesiologist:  Tatyana Neri MD       Performed By: anesthesiologist       Location: OR       Pre-Anesthestic Checklist: patient identified, IV checked, risks and benefits discussed, informed consent, monitors and equipment checked, pre-op evaluation and at physician/surgeon's request  Timeout:       Correct Patient: Yes        Correct Procedure: Yes        Correct Site: Yes        Correct Position: Yes   Line Placement:   This line was placed Pre Induction starting at 9/5/2024 7:30 AM and ending at 9/5/2024 7:33 AM  Procedure   Procedure: arterial line       Laterality: right       Insertion Site: radial.  Sterile Prep        Standard elements of sterile barrier followed       Skin prep: Chloraprep  Insertion/Injection        Technique: ultrasound guided        1. Ultrasound was used to evaluate the access site.       2. Artery evaluated via ultrasound for patency/adequacy.       3. Using real-time ultrasound the needle/catheter was observed entering the artery/vein.       4. Permanent image was captured and entered into the patient's record.       5. The visualized structures were anatomically normal.       6. There were no apparent abnormal pathologic findings.       Catheter Type/Size: 20 G, 12 cm  Narrative         Secured by: suture       Biopatch and Tegaderm dressing used.       Complications: None apparent,        Arterial waveform: Yes        IBP within 10% of NIBP: Yes

## 2024-09-05 NOTE — BRIEF OP NOTE
[facility]    Brief Operative Note    Pre-operative diagnosis: Aortic valve disease [I35.9]  Aneurysm of ascending aorta without rupture (H24) [I71.21]  Other specified symptoms and signs involving the circulatory and respiratory systems [R09.89]  Post-operative diagnosis Same as pre-operative diagnosis    Procedure: AORTIC VALVE REPLACEMENT AND ASCENDING AORTIC REPLACEMENT, LEFT ATRIAL APPENDAGE EXCISION, EPIAORTIC ULTRASOUND, N/A - Chest  ANESTHESIA TRANSESOPHAGEAL ECHOCARDIOGRAM, N/A - Heart    Surgeon: Surgeons and Role:     * Alfredo Morales MD - Primary     * Jim Robledo MD - Assisting  Anesthesia: General   Estimated Blood Loss: 700 ml    Drains:  2x mediastinal CT    Specimens:   ID Type Source Tests Collected by Time Destination   1 : LEFT ATRIAL APPENDAGE Tissue Heart SURGICAL PATHOLOGY EXAM Alfredo Morales MD 2024  9:09 AM    2 : ASCENDING AORTA Tissue Aorta SURGICAL PATHOLOGY EXAM Alfredo Morales MD 2024  9:17 AM    3 : AORTIC VALVE Tissue Heart Valve, Aortic SURGICAL PATHOLOGY EXAM Alfredo Morales MD 2024  9:17 AM      Findings:   AVR roy resilia Bioprosthetic valve 27mm. Ascending aorta replacement     Complications: None.    Implants:   Implant Name Type Inv. Item Serial No.  Lot No. LRB No. Used Action   GRAFT VASCULAR GEL WEAVE ST 30MM [id number] - [id number] Graft GRAFT VASCULAR GEL WEAVE ST 30MM [id number] [id number] N/A 1 Implanted   GRAFT PERICARDIUM [id number] RICO-GUARD [id number] - [id number] Bone/Tissue/Biologic GRAFT PERICARDIUM 8X14CM RICO-GUARD [id number]  [facility] [id number] N/A 1 Implanted   INSPIRIS RESILIA AORTIC VALVE   [id number]   N/A 1 Implanted

## 2024-09-05 NOTE — OP NOTE
DATE OF SERVICE: 9/5/2024.     PREOPERATIVE DIAGNOSES:  1.  Bicuspid aortic valve.  2.  Severe aortic stenosis.  3.  Ascending aortic aneurysm.     POSTOPERATIVE DIAGNOSES:  1.  Bicuspid aortic valve.  2.  Severe aortic stenosis.  3.  Ascending aortic aneurysm.      PROCEDURE PERFORMED:  1.  Wheat procedure (Aortic valve replacement with a 27 mm Goss Inspiris bioprosthetic valve and ascending aortic replacement with a 30 mm Gelweave graft from the sinotubular junction to the distal ascending aorta.  2.  Left atrial appendage excision.    SURGEON:  Alfredo Morales MD, PhD.     RESIDENT SURGEON: Jim Robledo MD.     FIRST ASSISTANT:  Nasreen Liao, surgical first assistant/surgical technician.     ANESTHESIA:  General endotracheal anesthesia.     ANESTHESIOLOGIST:  Tatyana Neri MD.     ESTIMATED BLOOD LOSS:  500 mL.     SPECIMEN:  Aortic valve cusps.     INDICATIONS FOR PROCEDURE: Ms. Molina is a 67 year-old woman with a bicuspid aortic valve with known moderate-severe aortic stenosis, as well as mild aortic regurgitation and an ascending aortic aneurysm measuring 5 cm. I recommend aortic valve replacement with a bioprosthetic valve and ascending aortic repair with a Gelweave graft. Coronary angiography demonstrates no obstructive coronary artery disease. Echocardiography demonstrates preserved left ventricular function in addition to severe aortic stenosis with a bicuspid aortic valve. The patient understands the risks and benefits of the procedure and wishes to undergo the operation.     OPERATIVE FINDINGS:  The aortic valve had fusion of the right and left cusps, and the cusps and annulus were severely calcified. The left main and right coronary ostia were in the usual anatomic position. After placement of a 27 mm Goss Inspiris bioprosthetic valve, the  left main and right coronary ostia were patent. After reperfusion, sinus rhythm resumed. There was no paravalvular leak and normal prosthetic valve  function.  Left ventricular function was normal preoperatively and unchanged after bypass on low-dose inotropic support.     OPERATIVE DESCRIPTION IN DETAIL:  After obtaining informed consent, the patient was brought to the operating room and placed in the supine position on the operating room table.  Appropriate lines and devices for monitoring were placed by the anesthesia team.  The patient underwent smooth induction of general anesthesia, and the trachea was intubated orally.  A right internal jugular Cordis introducer was placed, and a Shiloh-Racquel catheter was placed through this.  The patient was prepped and draped, and a timeout was performed to confirm the correct patient identity, as well as the procedure to be performed.      A median sternotomy was performed and the pericardium was opened.  The patient was given full dose heparin to achieve an activated clotting time over 480 seconds, and after cannulation of the aortic arch and the right atrium, cardiopulmonary bypass was commenced. In addition to a left ventricular vent placed through the right superior pulmonary vein, antegrade and retrograde cardioplegia cannulae were placed, and the heart was arrested with 1 liter of cold antegrade blood cardioplegia.Subsequent maintenance doses of 300 mL of cold retrograde blood cardioplegia were given approximately every 20 minutes.  Topical cold saline slush was applied and the patient was cooled passively to 34 degrees Celsius for additional protection.     Left atrial appendage excision was performed and the defect was closed with running 3-0 Prolene; the first layer was running horizontal mattress and the second layer was running continuous.    The ascending aorta was excised from the aortic cross clamp to the sinotubular junction, and the aortic valve was examined with the above findings noted. The aortic valve cusps were excised sharply and the annulus was debrided meticulously. Simple interrupted annular  "non-pledgeted 2-0 Ethibond sutures were placed circumferentially along the annulus in non-everting fashion (ventricular to aortic side) and brought through the sewing ring of a 27 mm Goss Inspiris Resilia bioprosthetic valve. This was lowered into place and the sutures were secured down and cut with the CoreKnot device. The patency of the left main and right coronary ostia was confirmed.      The proximal anastomosis of the sinotubular junction to the 30 mm Gelweave graft was constructed in end-to-end fashion with running 4-0 Prolene buttressing the aorta with bovine pericardial strips along the intima and the adventitia. The Gelweave graft was cut to length, and the distal anastomosis was completed in end-to-end fashion with running 4-0 Prolene buttressing the aorta with bovine pericardial strips along the intima and the adventitia. An antegrade cardioplegia cannula/aortic root vent was placed in the mid portion of the Gelweave graft. A dose of terminal warm blood cardioplegia (\"hotshot\") was delivered through the retrograde cardioplegia cannula, and the left heart was de-aired. The aortic cross clamp was released and the heart was resuscitated.      All bleeding points were controlled.  A bipolar ventricular pacing lead was placed and brought out through a separate stab incision. The patient weaned from cardiopulmonary bypass, was given protamine and decannulated.  A 24-Citizen [place] Binh drain was placed in the pericardial space and brought out through a separate stab incision.  A 28-[place]izen [place] chest tube was placed in the anterior mediastinum and brought out through a separate stab incision.  The sternal edges were reapposed with 3 interrupted #6 stainless steel sternal wires in the manubrium, 3 double wires in the body of the sternum and an additional #6 stainless steel sternal wire at the lower aspect of the sternum.  The muscle, fascia, and skin were closed in layers with absorbable suture.  Dermabond was applied.  " All sponge, needle and instrument counts were correct at the end of the case.  T     Alfredo Morales MD PhD

## 2024-09-05 NOTE — ANESTHESIA PROCEDURE NOTES
Central Line/PA Catheter Placement    Pre-Procedure   Staff -        Anesthesiologist:  Tatayna Neri MD       Performed By: anesthesiologist       Location: OR       Pre-Anesthestic Checklist: patient identified, IV checked, site marked, risks and benefits discussed, informed consent, monitors and equipment checked, pre-op evaluation and at physician/surgeon's request  Timeout:       Correct Patient: Yes        Correct Procedure: Yes        Correct Site: Yes        Correct Position: Yes        Correct Laterality: Yes   Line Placement:   This line was placed Post Induction starting at 9/5/2024 7:45 AM and ending at 9/5/2024 7:52 AM    Procedure   Procedure: central line and elective       Laterality: right       Insertion Site: internal jugular.       Patient Position: Trendelenburg  Sterile Prep        All elements of maximal sterile barrier technique followed       Patient Prep/Sterile Barriers: draped, hand hygiene, gloves , hat , mask , draped, gown, sterile gel and probe cover       Skin prep: Chloraprep  Insertion/Injection        Technique: ultrasound guided        1. Ultrasound was used to evaluate the access site.       2. Vein evaluated via ultrasound for patency/adequacy.       3. Using real-time ultrasound the needle/catheter was observed entering the artery/vein.       4. Permanent image was captured and entered into the patient's record.       5. The visualized structures were anatomically normal.       6. There were no apparent abnormal pathologic findings.       Introducer Type: 9 Fr, 2-lumen MAC        Type: Introducer       Number of Lumens: double lumen  Narrative         Secured by: suture       Tegaderm dressing used.       Complications: None apparent,        blood aspirated from all lumens,        Verification method: Placement to be verified post-op       Tip termination: other location

## 2024-09-05 NOTE — ANESTHESIA CARE TRANSFER NOTE
Patient: Maria Ines Molina    Procedure: Procedure(s):  AORTIC VALVE REPLACEMENT AND ASCENDING AORTIC REPLACEMENT, LEFT ATRIAL APPENDAGE EXCISION, EPIAORTIC ULTRASOUND  ANESTHESIA TRANSESOPHAGEAL ECHOCARDIOGRAM       Diagnosis: Aortic valve disease [I35.9]  Aneurysm of ascending aorta without rupture (H24) [I71.21]  Other specified symptoms and signs involving the circulatory and respiratory systems [R09.89]  Diagnosis Additional Information: No value filed.    Anesthesia Type:   General     Note:    Oropharynx: endotracheal tube in place  Level of Consciousness: iatrogenic sedation      Independent Airway: airway patency not satisfactory and stable  Dentition: dentition changed  Vital Signs Stable: post-procedure vital signs reviewed and stable  Report to RN Given: handoff report given  Patient transferred to: [facility]          Vitals:  Vitals Value Taken Time   /68 09/05/24 1256   Temp     Pulse 80 09/05/24 1258   Resp     SpO2 98 % 09/05/24 1258   Vitals shown include unfiled device data.    Electronically Signed By: ELFEGO Amaya CRNA  September 5, 2024  1:00 PM

## 2024-09-05 NOTE — H&P
Cardiac and Thoracic Surgery Consultation      Maria Ines Molina MRN# [id number]   YOB: 1955 Age: 69 year old   Date of Admission: 9/5/2024             Assessment and Plan:   Ms. Molina is a 67 year-old woman with a bicuspid aortic valve with known moderate-severe aortic stenosis, as well as mild aortic regurgitation and an ascending aortic aneurysm measuring 5 cm. The ascending aortic aneurysm has been followed for several years now, and it has been fairly less stable. She is minimally symptomatic and remains active swimming. She has considered surgery, but then thought to wait. She had a second opinion with a similar recommendation for surgery, and now she returns to discuss this more. I answered all of her questions, and we discussed the option of surgery more. She would have an aortic valve replacement with a bioprosthetic valve and ascending aortic repair with a Gelweave graft.      We already previously discussed the ACC/AHA recommendation is to replace the ascending aorta if it is over 4.5 cm in patients with a bicuspid aortic valve at the time of aortic valve replacement. I still believe that with her mean aortic valve gradient increasing and her aortic stenosis being moderate-severe, it is reasonable to proceed with surgery whenever she is comfortable and resolved with this. .              Chief Complaint:   Ms. Molina is a 67 year-old woman with a bicuspid aortic valve with known moderate-severe aortic stenosis, as well as mild aortic regurgitation and an ascending aortic aneurysm measuring 5 cm.    History is obtained from the patient         History of Present Illness:   This patient is a 69 year old female who presents for  aortic valve replacement with a bioprosthetic valve and ascending aortic repair with a Gelweave graft.                Past Medical History:     Past Medical History:   Diagnosis Date    Hyperlipidemia     Hypertension     Obesity     Thoracic aortic aneurysm (H24)               Past Surgical History:     Past Surgical History:   Procedure Laterality Date    COLONOSCOPY  10/11/2012    Diverticulosis, otherwise negative    CV CORONARY ANGIOGRAM N/A 3/1/2024    Procedure: Coronary Angiogram;  Surgeon: Jose Cruz Omalley MD;  Location: [facility] CATH LAB CV               Social History:     Social History     Tobacco Use    Smoking status: Former     Current packs/day: 0.00     Types: Cigarettes     Quit date: 1984     Years since quittin.7    Smokeless tobacco: Never   Substance Use Topics    Alcohol use: Not Currently             Family History:   No family history on file.          Immunizations:     Immunization History   Administered Date(s) Administered    COVID-19 Monovalent 18+ (Moderna) 2021, 2021             Allergies:   No Known Allergies          Medications:     Current Facility-Administered Medications   Medication Dose Route Frequency Provider Last Rate Last Admin    aminocaproic acid (AMICAR) 5 g in sodium chloride 0.9 % 100 mL bolus  5 g Intravenous Once Alfredo Morales MD        aminocaproic acid (AMICAR) 5 g in sodium chloride 0.9 % 100 mL infusion  1 g/hr Intravenous Continuous Alfredo Morales MD        cardioplegia low K - standard PERFUSION   PERFUSION On Call to OR Alfredo Morales MD        cardioplegic soln Hi K - Standard PERFUSION   PERFUSION On Call to OR Alfredo Morales MD        cardioplegic soln Hot Shot - Standard PERFUSION   PERFUSION On Call to OR Alfredo Morales MD        ceFAZolin Sodium (ANCEF) injection 2 g  2 g Intravenous Pre-Op/Pre-procedure x 1 dose Alfredo Morales MD        ceFAZolin Sodium (ANCEF) injection 2 g  2 g Intravenous See Admin Instructions Alfredo Morales MD        dexmedeTOMIDine (PRECEDEX) 4 mcg/mL in sodium chloride 0.9 % 100 mL infusion  0.2-1.2 mcg/kg/hr Intravenous Continuous Alfredo Morales MD        EPINEPHrine  (ADRENALIN) 5 mg in sodium chloride 0.9 % 250 mL infusion CENTRAL  0.01-0.3 mcg/kg/min Intravenous Continuous Alfredo Morales MD        heparin cell saver PERFUSION solution   PERFUSION On Call to OR Alfredo Morales MD        HOLD:  All AM Medications   Does not apply HOLD Alfredo Morales MD        insulin regular (MYXREDLIN) 1 unit/mL infusion  0-24 Units/hr Intravenous Continuous Alfredo Morales MD        lactated ringers infusion   Intravenous Continuous Pedro Green MD        lidocaine (LMX4) cream   Topical Q1H PRN Alfredo Morales MD        lidocaine (LMX4) cream   Topical Q1H PRN Pedro Green MD        lidocaine 1 % 0.1-1 mL  0.1-1 mL Other Q1H PRN Alfredo Morales MD        lidocaine 1 % 0.1-1 mL  0.1-1 mL Other Q1H PRN Pedro Green MD        magnesium sulfate 4 g in 50 mL sterile water intermittent infusion  4 g Intravenous Once Pedro Green MD 25 mL/hr at 09/05/24 0656 4 g at 09/05/24 0656    methadone (DOLOPHINE) 10 MG/ML injection             methadone (DOLOPHINE) injection 20 mg  20 mg Intravenous Once Pedro Green MD        niCARdipine 40 mg in 200 mL NS (CARDENE) infusion  0.5-15 mg/hr Intravenous Continuous Alfredo Morales MD        norepinephrine (LEVOPHED) 4 mg in  mL infusion PREMIX  0.01-0.1 mcg/kg/min Intravenous Continuous Alfredo Morales MD        phenylephrine (BEAU-SYNEPHRINE) 50 mg in NaCl 0.9 % 250 mL infusion  0.1-6 mcg/kg/min Intravenous Continuous Alfredo Morales MD        prime solution for OR INFUSION   Intravenous On Call to OR Alfredo Morales MD        sodium chloride (PF) 0.9% PF flush 3 mL  3 mL Intracatheter Q8H Alfredo Morales MD        sodium chloride (PF) 0.9% PF flush 3 mL  3 mL Intracatheter q1 min prn Alfredo Morales MD        sodium chloride (PF) 0.9% PF flush 3 mL  3 mL Intracatheter Q8H Pedro Green MD         sodium chloride (PF) 0.9% PF flush 3 mL  3 mL Intracatheter q1 min prn Pedro Green MD        vancomycin (VANCOCIN) 1,500 mg in sodium chloride 0.9 % 250 mL intermittent infusion  1,500 mg Intravenous Pre-Op/Pre-procedure x 1 dose Alfredo Morales MD   1,500 mg at 09/05/24 0651    vein mixture infusion   PERFUSION On Call to OR Alfredo Morales MD                 Review of Systems:     The 10 point Review of Systems is negative other than noted in the HPI            Physical Exam:   Vitals were reviewed      BP: (!) 175/108 Pulse: 76   Resp: 16 SpO2: 98 % O2 Device: None (Room air)    Constitutional:   awake, alert, cooperative, no apparent distress, and appears stated age     Eyes:   Lids and lashes normal, pupils equal, round and reactive to light, extra ocular muscles intact, sclera clear, conjunctiva normal     ENT:   normocephalic, without obvious abnormality     Neck:   supple, symmetrical, trachea midline     Lungs:   no increased work of breathing, good air exchange, and no retractions     Cardiovascular:   regular rate and rhythm     Abdomen:   non-distended     Musculoskeletal:   no lower extremity pitting edema present  there is no redness, warmth, or swelling of the joints  full range of motion noted  motor strength is 5 out of 5 all extremities bilaterally     Neurologic:   Mental Status Exam:  Level of Alertness:   awake  Orientation:   person, place, time  Memory:   normal  Cranial Nerves:  cranial nerves II-XII are grossly intact  Motor Exam:  moves all extremities well and symmetrically     Neuropsychiatric:   General: normal, calm, and normal eye contact  Level of consciousness: alert / normal  Affect: normal     Skin:   no bruising or bleeding, normal skin color, texture, turgor, and no redness, warmth, or swelling          Data:   All laboratory data reviewed  All cardiac studies reviewed by me.  All imaging studies reviewed by me.

## 2024-09-05 NOTE — PROGRESS NOTES
RT PROGRESS NOTE    VENT DAY# Ventilation Day(s): 1    Current Ventilator Settings:  Patient arrived from OR  Initial settings as followed  Vte 460ml  RR 18  Fi02 60% (per anesthesia)  Peep 5          ET tube 7.0 at 21 to secured with silk tape, right corner of the mouth          Weaned 1554 to 1710 5/5+ 35% and did well. Total time 76 minutes.    Extubated 1710    Oral suction provided and procedure explained. Patient extubated to 3L nasal canula, strong cough, and was able to verbalize. No stridor noted post extubation, breath sounds clear diminished.     Will continue to follow and work on pulmonary hygiene.     Trinity Burnett, RT

## 2024-09-05 NOTE — ANESTHESIA PROCEDURE NOTES
Airway         Procedure Start/Stop Times: 9/5/2024 7:38 AM  Staff -        CRNA: Esteban Echevarria APRN CRNA       Performed By: CRNA  Consent for Airway        Urgency: elective  Indications and Patient Condition       Indications for airway management: amara-procedural       Induction type:intravenous       Mask difficulty assessment: 1 - vent by mask    Final Airway Details       Final airway type: endotracheal airway       Successful airway: ETT - single  Endotracheal Airway Details        ETT size (mm): 7.0       Cuffed: yes       Successful intubation technique: video laryngoscopy       VL Blade Size: Glidescope 3       Grade View of Cords: 2       Adjucts: stylet       Position: Right       Measured from: gums/teeth       Secured at (cm): 21       Bite block used: None    Post intubation assessment        Placement verified by: capnometry, equal breath sounds and chest rise        Number of attempts at approach: 1       Number of other approaches attempted: 0       Secured with: tape       Ease of procedure: easy       Dentition: Intact       Dental guard used and removed.    Medication(s) Administered   Medication Administration Time: 9/5/2024 7:38 AM

## 2024-09-05 NOTE — ANESTHESIA POSTPROCEDURE EVALUATION
Patient: Maria Ines Molina    Procedure: Procedure(s):  AORTIC VALVE REPLACEMENT AND ASCENDING AORTIC REPLACEMENT, LEFT ATRIAL APPENDAGE EXCISION, EPIAORTIC ULTRASOUND  ANESTHESIA TRANSESOPHAGEAL ECHOCARDIOGRAM       Anesthesia Type:  General    Note:  Disposition: Admission; Inpatient; ICU            ICU Sign Out: Anesthesiologist/ICU physician sign out WAS performed   Postop Pain Control: Uneventful            Sign Out: Well controlled pain   PONV: No   Neuro/Psych: Uneventful            Sign Out: Acceptable/Baseline neuro status   Airway/Respiratory: Uneventful            Sign Out: AIRWAY IN SITU/Resp. Support               Airway in situ/Resp. Support: ETT                 Reason: Planned Pre-op   CV/Hemodynamics: Uneventful            Sign Out: Acceptable CV status; No obvious hypovolemia; No obvious fluid overload   Other NRE: NONE   DID A NON-ROUTINE EVENT OCCUR?     Event details/Postop Comments:  Patient transported intubated and sedated to the ICU. VSS throughout transport. Sign out given at bedside.            Last vitals:  Vitals:    09/05/24 0523 09/05/24 0528   BP: (!) 181/107 (!) 175/108   Pulse: 76    Resp: 16    SpO2: 98%        Electronically Signed By: Tatyana Neri MD  September 5, 2024  1:04 PM

## 2024-09-05 NOTE — CONSULTS
NUTRITION EDUCATION    REASON FOR ASSESSMENT:  Provider Order- Nutrition Education Post CV Surgery    Received standing order to educate patient.     Will follow and complete diet education after patient is extubated and/or is transferred to medical unit.

## 2024-09-05 NOTE — PROGRESS NOTES
CT EXTUBATION FAST TRACK:    [facility] - ICU     FastTrack Candidate: Yes, Extubation Goal Time ( 6 Hours )       Patient Status: Extubated        [facility] - ICU    RN Progress Note:            Pertinent Assessments:      Please refer to flowsheet rows for full assessment     Cardiovascular/Hemodynamic, Respiratory           Key Events - This Shift:       2 liters lactated ringers given for fluid resuscitation and to achieve hemodynamic goals. Patient was able to perform SBT and extubated to nasal cannula at 17:10. Chest tube output WNL, however protamine given for elevated PTT. Ventricular pacing wires in place, initially paced at 80 bpm, now set to backup rate with intrinsic NSR in the 70's.                Barriers to Discharge / Downgrade:     Vasopressors, insulin drip, day 0 CV surgery         Point of Contact Update: YES-OR-NO: Yes  Name: Michael (spouse) at bedside. Informed of plan of care. All questions answered.        Pascale Del Real RN

## 2024-09-05 NOTE — ANESTHESIA PROCEDURE NOTES
Perioperative GREG Procedure Note    Staff -        Performed By: anesthesiologist  Preanesthesia Checklist:  Patient identified, IV assessed, risks and benefits discussed, monitors and equipment assessed, procedure being performed at surgeon's request and anesthesia consent obtained.    GREG Probe Insertion    Probe Status PRE Insertion: NO obvious damage  Probe type:  Adult 3D  Bite block used:   Oral Airway  Insertion Technique: Easy, no oropharyngeal manipulation  Insertion complications: None obvious  Billing Report:A GREG report is NOT being generated.  Probe Status POST Removal: NO obvious damage

## 2024-09-05 NOTE — CONSULTS
ICU Consults Note (09/05/2024)     Date of Hospital Admission: 9/5/2024  Date of ICU Admission: 9/5/2024  Code Status: Full Code    Reason for Visit  Status post AVR.    HPI  67 year-old woman with a bicuspid aortic valve with known moderate-severe aortic stenosis, as well as mild aortic regurgitation and an ascending aortic aneurysm measuring 5 cm. Overall, in a good state of health. She underwent an elective aortic valve replacement, ascending aortic replacement and left atrial appendage excision.     Overall, the surgery went well. She received 228 mL Cell Saver, but no blood products.  Her EF was 55 to 60% both pre-, post op.  CVP range between 8-12.  She did not require vasopressors.  She has V wires and she is currently paced at 80. She arrives to the ICU intubated.    Subjective  Seen and examined in the ICU.    Objective   Vital Signs  Blood pressure (!) 175/108, pulse 76, resp. rate 16, weight 93 kg (205 lb), SpO2 98%.  No intake/output data recorded.  Resp: 16    Physical Exam   General:  intubated  Head:  normocephalic, atraumatic    Eyes: conjunctiva clear, PERRL.   Throat:  Orally intubated. No erythema, exudates or lesions.   Neck:  Supple, no masses  Heart:  RRR, no murmur   Lungs:  Coarse bilaterally. .   Abdomen:  Soft, NT/ND, no HSM, no masses.   Extremities: No deformities, clubbing, cyanosis, or edema.   Neurologic: Intubated, and sedated, moving all extremities. No focal deficits.     Diagnostic Data  The following portions of the patient's chart were reviewed: current medications, problem list, laboratory workup, and diagnostic data.    Most Recent Pertinent Labs  Lab Results   Component Value Date    WBC 15.6 (H) 09/05/2024    HGB 8.6 (L) 09/05/2024    HCT 25.9 (L) 09/05/2024     (L) 09/05/2024     09/05/2024    POTASSIUM 3.2 (L) 09/05/2024    CHLORIDE 101 08/28/2024    CO2 26 08/28/2024    BUN 20.8 08/28/2024    CR 0.72 08/28/2024     (H) 09/05/2024    AST 18  08/28/2024    ALT 22 08/28/2024    ALKPHOS 76 08/28/2024    INR 1.68 (H) 09/05/2024     Infusion Medications  Current Facility-Administered Medications   Medication Dose Route Frequency Provider Last Rate Last Admin    dexmedeTOMIDine (PRECEDEX) 4 mcg/mL in sodium chloride 0.9 % 100 mL infusion  0.1-1.2 mcg/kg/hr Intravenous Continuous Lina Daniel PA-C        EPINEPHrine (ADRENALIN) 5 mg in sodium chloride 0.9 % 250 mL infusion CENTRAL  0.01-0.1 mcg/kg/min Intravenous Continuous PRN Lina Daniel PA-C        insulin regular (MYXREDLIN) 1 unit/mL infusion  0-24 Units/hr Intravenous Continuous Lina Daniel PA-C        niCARdipine 40 mg in 200 mL NS (CARDENE) infusion  0.5-15 mg/hr Intravenous Continuous PRN Lina Daniel PA-C        phenylephrine (BEAU-SYNEPHRINE) 50 mg in NaCl 0.9 % 250 mL infusion  0.1-4 mcg/kg/min Intravenous Continuous PRN Lina Daniel PA-C         Scheduled Medications  Current Facility-Administered Medications   Medication Dose Route Frequency Provider Last Rate Last Admin    acetaminophen (TYLENOL) Suppository 650 mg  650 mg Rectal Q8H Lina Daniel PA-C        acetaminophen (TYLENOL) tablet 975 mg  975 mg Oral Q8H Lina Daniel PA-C        [START ON 9/6/2024] aspirin (ASA) chewable tablet 81 mg  81 mg Oral or NG Tube Daily Lina Daniel PA-C        Or    [START ON 9/6/2024] aspirin (ASA) Suppository 300 mg  300 mg Rectal Daily Lina Daniel PA-C        ceFAZolin (ANCEF) 2 g in 100 mL D5W intermittent infusion  2 g Intravenous Q8H Lina Daniel PA-C        [START ON 9/6/2024] heparin ANTICOAGULANT injection 5,000 Units  5,000 Units Subcutaneous Q8H Lina Daniel PA-C        Lidocaine (LIDOCARE) 4 % Patch 1-2 patch  1-2 patch Transdermal Q24H Lina Daniel PA-C        [START ON 9/6/2024] multivitamin w/minerals (THERA-VIT-M) tablet 1 tablet  1 tablet Oral Daily Fredy  Lina De Jesus PA-C        [START ON 9/6/2024] pantoprazole (PROTONIX) 2 mg/mL suspension 40 mg  40 mg Oral or NG Tube Daily Lina Daniel PA-C        Or    [START ON 9/6/2024] pantoprazole (PROTONIX) EC tablet 40 mg  40 mg Oral Daily Lina Daniel PA-C        [START ON 9/6/2024] polyethylene glycol (MIRALAX) Packet 17 g  17 g Oral Daily Lina Daniel PA-C        senna-docusate (SENOKOT-S/PERICOLACE) 8.6-50 MG per tablet 1 tablet  1 tablet Oral BID Lina Daniel PA-C        vancomycin (VANCOCIN) 1,500 mg in sodium chloride 0.9 % 250 mL intermittent infusion  1,500 mg Intravenous Q12H Lina Daniel PA-C          Restraint Application  I recognize that restraints are physical and/or chemical interventions intended to restrict a person's movements. Restraints are currently needed to ensure the safety of this patient and/or others. My clinical rationale appears below:  Category/Type of Restraint: Non Violent:  Soft limb restraint x 2  Behavior: Pulling at tubes/lines  Root Cause of the Behavior: Sedation/intubation  Less-Restrictive Measures that Failed: Non Violent Measures:  Close Observation  Response to the Restraint: Patient unable to pull at tubes/lines  Criteria for Release from the Restraint: Patient calm and off sedation    Assessment & Plan   Active Problems:    Aneurysm of ascending aorta without rupture (H24)    Neurology  # Sedation and Analgesia  - dexmedetomidine infusion, wean for neurologic examination   - Per CTS, including Precedex & PRNs.  - plan for extubation within six hours of arrival in the ICU as able      Cardiovascular:  # Bicuspid aortic valve status post elective aortic valve replacement, ascending aortic replacement and left atrial appendage excision.   - antiplatelet & statins per CV surgery        # Post-cardiopulmonary bypass vasoplegia    - Preserved pre & post-biventricular systolic function   - hemodynamic management per discussion  with CV surgery  - MAP 60-90, SBP , epinephrine & phenylephrine are available     # Temporary epicardial pacing wires  - DDI 80 BPM     Respiratory, Airway:  # Post-operative mechanical ventilation   - radiograph demonstrated ETT & CVC in good position  - lung protective ventilation (TV 6-8 mL/kg IBW, Pplat <30, driving pressure <15)   - Goal SpO2 >/= 92%, wean supplemental oxygen as tolerated.   - HOB elevation > 30 degrees to minimize aspiration risk.   - Continuous EtCO2 while intubated.  - enhanced recovery after cardiac surgery / fast-track may extubate within six hours   - pulmonary hygiene: acapella, IS, PT/OT as able, OOB as able      # Post-operative chest tubes  - Chest tubes present to drain pleural & mediastinal fluid placed to wall suction       Gastrointestinal:  - NPO until extubated & fully awake.    - No active issues   - On pantoprazole for GI prophylaxis    Renal  - Monitor kidney function, urine output, and electrolytes.     Infectious Disease:  Dorothy-operative antibiotics per CV surgery      Hematology, Oncology:  # Post-operative anemia  # Risk for post-cardiac bypass coagulopathy   # Risk for post-cardiac bypass thrombocytopenia   - Close CT output monitoring, CVS to be notified w/ excessive or abruptly absent output  - monitor for bleeding: Hgb goal >7-8  - Anticipate fall in platelet count after cardiopulmonary bypass in most patients, typically to levels approximately half of baseline, rick between postoperative days 2-4, & persists for 4 - 6 days following surgery.     Endocrine:  # MICU insulin/glucose protocol   - maintain BG of 140 to 180 mg/dL with a continuous IV insulin infusion    Total time excluding any procedural time is 80 minutes.    Angel Grayson MD  Pager [phone]  [employer] ([employer])   Vocera Web Console ([employer])

## 2024-09-06 ENCOUNTER — APPOINTMENT (OUTPATIENT)
Dept: OCCUPATIONAL THERAPY | Facility: HOSPITAL | Age: 69
DRG: 220 | End: 2024-09-06
Attending: PHYSICIAN ASSISTANT
Payer: MEDICARE

## 2024-09-06 ENCOUNTER — APPOINTMENT (OUTPATIENT)
Dept: RADIOLOGY | Facility: HOSPITAL | Age: 69
DRG: 220 | End: 2024-09-06
Attending: PHYSICIAN ASSISTANT
Payer: MEDICARE

## 2024-09-06 LAB
ALBUMIN SERPL BCG-MCNC: 3.4 G/DL (ref 3.5–5.2)
ALP SERPL-CCNC: 40 U/L (ref 40–150)
ALT SERPL W P-5'-P-CCNC: 19 U/L (ref 0–50)
ANION GAP SERPL CALCULATED.3IONS-SCNC: 9 MMOL/L (ref 7–15)
AST SERPL W P-5'-P-CCNC: 51 U/L (ref 0–45)
ATRIAL RATE - MUSE: 67 BPM
BILIRUB SERPL-MCNC: 0.4 MG/DL
BUN SERPL-MCNC: 10.8 MG/DL (ref 8–23)
CA-I BLD-MCNC: 4.6 MG/DL (ref 4.4–5.2)
CALCIUM SERPL-MCNC: 7.7 MG/DL (ref 8.8–10.4)
CHLORIDE SERPL-SCNC: 110 MMOL/L (ref 98–107)
CREAT SERPL-MCNC: 0.65 MG/DL (ref 0.51–0.95)
DIASTOLIC BLOOD PRESSURE - MUSE: NORMAL MMHG
EGFRCR SERPLBLD CKD-EPI 2021: >90 ML/MIN/1.73M2
ERYTHROCYTE [DISTWIDTH] IN BLOOD BY AUTOMATED COUNT: 12.8 % (ref 10–15)
GLUCOSE BLDC GLUCOMTR-MCNC: 112 MG/DL (ref 70–99)
GLUCOSE BLDC GLUCOMTR-MCNC: 113 MG/DL (ref 70–99)
GLUCOSE BLDC GLUCOMTR-MCNC: 115 MG/DL (ref 70–99)
GLUCOSE BLDC GLUCOMTR-MCNC: 118 MG/DL (ref 70–99)
GLUCOSE BLDC GLUCOMTR-MCNC: 126 MG/DL (ref 70–99)
GLUCOSE BLDC GLUCOMTR-MCNC: 127 MG/DL (ref 70–99)
GLUCOSE BLDC GLUCOMTR-MCNC: 129 MG/DL (ref 70–99)
GLUCOSE BLDC GLUCOMTR-MCNC: 132 MG/DL (ref 70–99)
GLUCOSE SERPL-MCNC: 137 MG/DL (ref 70–99)
HCO3 SERPL-SCNC: 23 MMOL/L (ref 22–29)
HCT VFR BLD AUTO: 29 % (ref 35–47)
HGB BLD-MCNC: 9.6 G/DL (ref 11.7–15.7)
INTERPRETATION ECG - MUSE: NORMAL
MAGNESIUM SERPL-MCNC: 2.2 MG/DL (ref 1.7–2.3)
MCH RBC QN AUTO: 31.4 PG (ref 26.5–33)
MCHC RBC AUTO-ENTMCNC: 33.1 G/DL (ref 31.5–36.5)
MCV RBC AUTO: 95 FL (ref 78–100)
P AXIS - MUSE: 49 DEGREES
PHOSPHATE SERPL-MCNC: 3.2 MG/DL (ref 2.5–4.5)
PLATELET # BLD AUTO: 92 10E3/UL (ref 150–450)
POTASSIUM SERPL-SCNC: 4.3 MMOL/L (ref 3.4–5.3)
PR INTERVAL - MUSE: 148 MS
PROT SERPL-MCNC: 5 G/DL (ref 6.4–8.3)
QRS DURATION - MUSE: 88 MS
QT - MUSE: 400 MS
QTC - MUSE: 422 MS
R AXIS - MUSE: 21 DEGREES
RBC # BLD AUTO: 3.06 10E6/UL (ref 3.8–5.2)
SODIUM SERPL-SCNC: 142 MMOL/L (ref 135–145)
SYSTOLIC BLOOD PRESSURE - MUSE: NORMAL MMHG
T AXIS - MUSE: 20 DEGREES
VENTRICULAR RATE- MUSE: 67 BPM
WBC # BLD AUTO: 8.7 10E3/UL (ref 4–11)

## 2024-09-06 PROCEDURE — 85027 COMPLETE CBC AUTOMATED: CPT | Performed by: PHYSICIAN ASSISTANT

## 2024-09-06 PROCEDURE — 93005 ELECTROCARDIOGRAM TRACING: CPT | Performed by: PHYSICIAN ASSISTANT

## 2024-09-06 PROCEDURE — 250N000013 HC RX MED GY IP 250 OP 250 PS 637: Performed by: PHYSICIAN ASSISTANT

## 2024-09-06 PROCEDURE — 999N000156 HC STATISTIC RCP CONSULT EA 30 MIN

## 2024-09-06 PROCEDURE — 71045 X-RAY EXAM CHEST 1 VIEW: CPT

## 2024-09-06 PROCEDURE — 258N000003 HC RX IP 258 OP 636: Performed by: PHYSICIAN ASSISTANT

## 2024-09-06 PROCEDURE — 80053 COMPREHEN METABOLIC PANEL: CPT | Performed by: PHYSICIAN ASSISTANT

## 2024-09-06 PROCEDURE — 84100 ASSAY OF PHOSPHORUS: CPT | Performed by: PHYSICIAN ASSISTANT

## 2024-09-06 PROCEDURE — 999N000157 HC STATISTIC RCP TIME EA 10 MIN

## 2024-09-06 PROCEDURE — 250N000011 HC RX IP 250 OP 636: Performed by: SURGERY

## 2024-09-06 PROCEDURE — 97110 THERAPEUTIC EXERCISES: CPT | Mod: GO

## 2024-09-06 PROCEDURE — 97165 OT EVAL LOW COMPLEX 30 MIN: CPT | Mod: GO

## 2024-09-06 PROCEDURE — 250N000011 HC RX IP 250 OP 636: Performed by: PHYSICIAN ASSISTANT

## 2024-09-06 PROCEDURE — 94799 UNLISTED PULMONARY SVC/PX: CPT

## 2024-09-06 PROCEDURE — 97535 SELF CARE MNGMENT TRAINING: CPT | Mod: GO

## 2024-09-06 PROCEDURE — 82330 ASSAY OF CALCIUM: CPT | Performed by: PHYSICIAN ASSISTANT

## 2024-09-06 PROCEDURE — 250N000013 HC RX MED GY IP 250 OP 250 PS 637: Performed by: SURGERY

## 2024-09-06 PROCEDURE — 93010 ELECTROCARDIOGRAM REPORT: CPT | Mod: HIP | Performed by: STUDENT IN AN ORGANIZED HEALTH CARE EDUCATION/TRAINING PROGRAM

## 2024-09-06 PROCEDURE — 83735 ASSAY OF MAGNESIUM: CPT | Performed by: PHYSICIAN ASSISTANT

## 2024-09-06 PROCEDURE — 93005 ELECTROCARDIOGRAM TRACING: CPT

## 2024-09-06 PROCEDURE — 210N000001 HC R&B IMCU HEART CARE

## 2024-09-06 RX ORDER — FUROSEMIDE 10 MG/ML
20 INJECTION INTRAMUSCULAR; INTRAVENOUS ONCE
Status: COMPLETED | OUTPATIENT
Start: 2024-09-06 | End: 2024-09-06

## 2024-09-06 RX ORDER — NICOTINE POLACRILEX 4 MG
15-30 LOZENGE BUCCAL
Status: DISCONTINUED | OUTPATIENT
Start: 2024-09-06 | End: 2024-09-06

## 2024-09-06 RX ORDER — PROCHLORPERAZINE MALEATE 5 MG
5 TABLET ORAL EVERY 6 HOURS PRN
Status: ACTIVE | OUTPATIENT
Start: 2024-09-06 | End: 2024-09-08

## 2024-09-06 RX ORDER — DEXTROSE MONOHYDRATE 25 G/50ML
25-50 INJECTION, SOLUTION INTRAVENOUS
Status: DISCONTINUED | OUTPATIENT
Start: 2024-09-06 | End: 2024-09-06

## 2024-09-06 RX ADMIN — Medication 1 TABLET: at 08:01

## 2024-09-06 RX ADMIN — SENNOSIDES AND DOCUSATE SODIUM 1 TABLET: 8.6; 5 TABLET ORAL at 08:01

## 2024-09-06 RX ADMIN — OXYCODONE HYDROCHLORIDE 5 MG: 5 TABLET ORAL at 05:15

## 2024-09-06 RX ADMIN — METOPROLOL TARTRATE 12.5 MG: 25 TABLET, FILM COATED ORAL at 20:27

## 2024-09-06 RX ADMIN — OXYCODONE HYDROCHLORIDE 10 MG: 5 TABLET ORAL at 19:20

## 2024-09-06 RX ADMIN — ACETAMINOPHEN 975 MG: 325 TABLET ORAL at 12:11

## 2024-09-06 RX ADMIN — POLYETHYLENE GLYCOL 3350 17 G: 17 POWDER, FOR SOLUTION ORAL at 08:01

## 2024-09-06 RX ADMIN — ASPIRIN 81 MG CHEWABLE TABLET 81 MG: 81 TABLET CHEWABLE at 08:01

## 2024-09-06 RX ADMIN — CEFAZOLIN SODIUM 2 G: 2 INJECTION, SOLUTION INTRAVENOUS at 09:26

## 2024-09-06 RX ADMIN — KETOROLAC TROMETHAMINE 15 MG: 15 INJECTION, SOLUTION INTRAMUSCULAR; INTRAVENOUS at 17:58

## 2024-09-06 RX ADMIN — OXYCODONE HYDROCHLORIDE 5 MG: 5 TABLET ORAL at 12:11

## 2024-09-06 RX ADMIN — LIDOCAINE 1 PATCH: 4 PATCH TOPICAL at 13:35

## 2024-09-06 RX ADMIN — HEPARIN SODIUM 5000 UNITS: 10000 INJECTION, SOLUTION INTRAVENOUS; SUBCUTANEOUS at 20:40

## 2024-09-06 RX ADMIN — HEPARIN SODIUM 5000 UNITS: 10000 INJECTION, SOLUTION INTRAVENOUS; SUBCUTANEOUS at 13:36

## 2024-09-06 RX ADMIN — FUROSEMIDE 20 MG: 10 INJECTION, SOLUTION INTRAMUSCULAR; INTRAVENOUS at 12:11

## 2024-09-06 RX ADMIN — ACETAMINOPHEN 975 MG: 325 TABLET ORAL at 04:06

## 2024-09-06 RX ADMIN — OXYCODONE HYDROCHLORIDE 5 MG: 5 TABLET ORAL at 00:25

## 2024-09-06 RX ADMIN — SODIUM CHLORIDE 1500 MG: 9 INJECTION, SOLUTION INTRAVENOUS at 06:01

## 2024-09-06 RX ADMIN — KETOROLAC TROMETHAMINE 15 MG: 15 INJECTION, SOLUTION INTRAMUSCULAR; INTRAVENOUS at 08:01

## 2024-09-06 RX ADMIN — PANTOPRAZOLE SODIUM 40 MG: 40 TABLET, DELAYED RELEASE ORAL at 08:01

## 2024-09-06 RX ADMIN — ACETAMINOPHEN 975 MG: 325 TABLET ORAL at 20:27

## 2024-09-06 RX ADMIN — SENNOSIDES AND DOCUSATE SODIUM 1 TABLET: 8.6; 5 TABLET ORAL at 20:27

## 2024-09-06 ASSESSMENT — ACTIVITIES OF DAILY LIVING (ADL)
ADLS_ACUITY_SCORE: 29
ADLS_ACUITY_SCORE: 30
ADLS_ACUITY_SCORE: 32
ADLS_ACUITY_SCORE: 32
ADLS_ACUITY_SCORE: 29
ADLS_ACUITY_SCORE: 29
ADLS_ACUITY_SCORE: 31
ADLS_ACUITY_SCORE: 31
ADLS_ACUITY_SCORE: 29
ADLS_ACUITY_SCORE: 32
ADLS_ACUITY_SCORE: 31
ADLS_ACUITY_SCORE: 32
ADLS_ACUITY_SCORE: 32
ADLS_ACUITY_SCORE: 29
ADLS_ACUITY_SCORE: 32
ADLS_ACUITY_SCORE: 32
ADLS_ACUITY_SCORE: 30
ADLS_ACUITY_SCORE: 32
ADLS_ACUITY_SCORE: 29
ADLS_ACUITY_SCORE: 32
ADLS_ACUITY_SCORE: 32
ADLS_ACUITY_SCORE: 30
ADLS_ACUITY_SCORE: 30

## 2024-09-06 NOTE — TREATMENT PLAN
RCAT Treatment Plan    Patient Score: 10  Patient Acuity: 4    Clinical Indication for Therapy:  Lt basilar atelectasis s/p AVR     Therapy Ordered: Flutter valve BID    Assessment Summary: Lt basilar atelectasis s/p AVR 09/05/24, RR 20, BS diminished bilat bases, no hx pulmonary disease, no home respiratory medications.; Pt ambulating with PT, performing IS and flutter valve independently, will change to BID. RT to follow    EXAM: XR CHEST PORT 1 VIEW  LOCATION: [facility]  DATE: 9/6/2024     INDICATION: Post Op CVTS Surgery  COMPARISON: Chest radiograph 9/5/2024                                                                    IMPRESSION:      Lines and tubes: Interval removal of the endotracheal tube. Similar right central venous catheter tip/sheath, mediastinal drains, median sternotomy and cardiac valve prosthesis.     Trace left basilar atelectasis and possible tiny pleural effusion. No convincing pneumothorax. Similar cardiomediastinal silhouette.        Pedro Lopes, RT  9/6/2024

## 2024-09-06 NOTE — PROGRESS NOTES
CVTS Daily Progress Note   POD 1 s/p bioprosthetic AVR, ascending aortic aneurysm repair, and BRITTNEY excision  Attending: Andrew  LOS: 1    SUBJECTIVE/INTERVAL EVENTS:    Arrived to ICU from OR yesterday afternoon. She was subsequently extubated and weaned from pressors. No acute events overnight. Patient progressing well. Maintaining oxygen saturations on supplemental oxygen via NC. Normotensive off pressors. OOB with therapy. Pain well controlled. -BM / -flatus. Tolerating PO intake - mostly sips of clears so far. UOP adequate. Chest tube output appropriate. Hgb stable. Patient denies new chest pain, shortness of breath, abdominal pain, and nausea. Main complaint is sore throat and pain with coughing; cough is occasionally reported as productive.  Patient has no questions today; thankful for the care she has received.    OBJECTIVE:  Temp:  [97.5  F (36.4  C)-98.4  F (36.9  C)] 98.4  F (36.9  C)  Pulse:  [62-80] 67  Resp:  [8-37] 20  BP: (107-114)/(62-68) 107/62  MAP:  [63 mmHg-146 mmHg] 80 mmHg  Arterial Line BP: ()/() 128/59  FiO2 (%):  [35 %-60 %] 35 %  SpO2:  [93 %-100 %] 96 %  Vitals:    09/05/24 0523 09/06/24 0615   Weight: 93 kg (205 lb) 99 kg (218 lb 4.8 oz)       Clinically Significant Risk Factors        # Hypokalemia: Lowest K = 3.2 mmol/L in last 2 days, will replace as needed   # Hypocalcemia: Lowest Ca = 7.7 mg/dL in last 2 days, will monitor and replace as appropriate     # Hypoalbuminemia: Lowest albumin = 3.4 g/dL at 9/6/2024  4:16 AM, will monitor as appropriate  # Coagulation Defect: INR = 1.48 (Ref range: 0.85 - 1.15) and/or PTT = 92 Seconds (Ref range: 22 - 38 Seconds), will monitor for bleeding  # Thrombocytopenia: Lowest platelets = 92 in last 2 days, will monitor for bleeding                         Current Medications:    Scheduled Meds:  Current Facility-Administered Medications   Medication Dose Route Frequency Provider Last Rate Last Admin    acetaminophen (TYLENOL)  Suppository 650 mg  650 mg Rectal Q8H Lina Daniel PA-C   650 mg at 09/05/24 1329    acetaminophen (TYLENOL) tablet 975 mg  975 mg Oral Q8H Lina Daniel PA-C   975 mg at 09/06/24 0406    aspirin (ASA) chewable tablet 81 mg  81 mg Oral or NG Tube Daily Lina Daniel PA-C   81 mg at 09/06/24 0801    ceFAZolin (ANCEF) 2 g in 100 mL D5W intermittent infusion  2 g Intravenous Q8H Lina Daniel PA-C 200 mL/hr at 09/05/24 2315 2 g at 09/05/24 2315    furosemide (LASIX) injection 20 mg  20 mg Intravenous Once Maci Guzman NP        heparin ANTICOAGULANT injection 5,000 Units  5,000 Units Subcutaneous Q8H Lina Daniel PA-C        insulin aspart (NovoLOG) injection (RAPID ACTING)  1-7 Units Subcutaneous TID AC Maci Guzman NP        insulin aspart (NovoLOG) injection (RAPID ACTING)  1-5 Units Subcutaneous At Bedtime Maci Guzman NP        Lidocaine (LIDOCARE) 4 % Patch 1-2 patch  1-2 patch Transdermal Q24H Lina Daniel PA-C   2 patch at 09/05/24 1329    multivitamin w/minerals (THERA-VIT-M) tablet 1 tablet  1 tablet Oral Daily Lina Daniel PA-C   1 tablet at 09/06/24 0801    pantoprazole (PROTONIX) 2 mg/mL suspension 40 mg  40 mg Oral or NG Tube Daily Lina Daniel PA-C        Or    pantoprazole (PROTONIX) EC tablet 40 mg  40 mg Oral Daily Lina Daniel PA-C   40 mg at 09/06/24 0801    polyethylene glycol (MIRALAX) Packet 17 g  17 g Oral Daily Lina Daniel PA-C   17 g at 09/06/24 0801    senna-docusate (SENOKOT-S/PERICOLACE) 8.6-50 MG per tablet 1 tablet  1 tablet Oral BID Lina Daniel PA-C   1 tablet at 09/06/24 0801     Continuous Infusions:  Current Facility-Administered Medications   Medication Dose Route Frequency Provider Last Rate Last Admin    Med Instruction - Transition from IV Insulin Infusion to Sub-Q Insulin   Does not apply Continuous ESTEFANYN Maci Guzman, RUIZ ALLISONN  Meds:  Current Facility-Administered Medications   Medication Dose Route Frequency Provider Last Rate Last Admin    [START ON 9/8/2024] acetaminophen (TYLENOL) tablet 650 mg  650 mg Oral Q4H PRN Lina Daniel PA-C        bisacodyl (DULCOLAX) suppository 10 mg  10 mg Rectal Daily PRN Lina Daniel PA-C        calcium gluconate 1 g in 50 mL in sodium chloride intermittent infusion  1 g Intravenous Once PRN Lina Daniel PA-C   1 g at 09/05/24 1358    calcium gluconate 2 g in  mL intermittent infusion  2 g Intravenous Once PRN Lina Daniel PA-C        calcium gluconate 3 g in sodium chloride 0.9 % 100 mL intermittent infusion  3 g Intravenous Once PRN Lina Daniel PA-C        cyclobenzaprine (FLEXERIL) tablet 10 mg  10 mg Oral TID PRN Alfredo Morales MD        glucose gel 15-30 g  15-30 g Oral Q15 Min PRN Maci Guzman NP        Or    dextrose 50 % injection 25-50 mL  25-50 mL Intravenous Q15 Min PRN Maci Guzman NP        Or    glucagon injection 1 mg  1 mg Subcutaneous Q15 Min PRN Maci Guzman NP        glucose gel 15-30 g  15-30 g Oral Q15 Min PRN Lina Daniel PA-C        Or    dextrose 50 % injection 25-50 mL  25-50 mL Intravenous Q15 Min PRN Lina Daniel PA-C        Or    glucagon injection 1 mg  1 mg Subcutaneous Q15 Min PRN Lina Daniel PA-C        hydrALAZINE (APRESOLINE) injection 10 mg  10 mg Intravenous Q30 Min PRN Lina Daniel PA-C        HYDROmorphone (DILAUDID) injection 0.2 mg  0.2 mg Intravenous Q2H PRN Lina Daniel PA-C        Or    HYDROmorphone (DILAUDID) injection 0.4 mg  0.4 mg Intravenous Q2H PRN Lina Daniel PA-C   0.4 mg at 09/05/24 1316    ketorolac (TORADOL) injection 15 mg  15 mg Intravenous Q6H PRN Alfredo Morales MD   15 mg at 09/06/24 0801    magnesium hydroxide (MILK OF MAGNESIA) suspension 30 mL  30 mL Oral Daily PRN Fredy,  Lina De Jesus PA-C        Med Instruction - Transition from IV Insulin Infusion to Sub-Q Insulin   Does not apply Continuous PRN Maci Guzman NP        naloxone (NARCAN) injection 0.2 mg  0.2 mg Intravenous Q2 Min PRN Lina Daniel PA-C        Or    naloxone (NARCAN) injection 0.4 mg  0.4 mg Intravenous Q2 Min PRN Lina Daniel PA-C        Or    naloxone (NARCAN) injection 0.2 mg  0.2 mg Intramuscular Q2 Min PRN Lina Daniel PA-C        Or    naloxone (NARCAN) injection 0.4 mg  0.4 mg Intramuscular Q2 Min PRN Lina Daniel PA-C        ondansetron (ZOFRAN ODT) ODT tab 4 mg  4 mg Oral Q6H PRN Lina Daniel PA-C        Or    ondansetron (ZOFRAN) injection 4 mg  4 mg Intravenous Q6H PRN Lina Daniel PA-C   4 mg at 09/05/24 1842    oxyCODONE (ROXICODONE) tablet 5 mg  5 mg Oral Q4H PRN Lina Daniel PA-C   5 mg at 09/06/24 0515    Or    oxyCODONE (ROXICODONE) tablet 10 mg  10 mg Oral Q4H PRN Lina Daniel PA-C        prochlorperazine (COMPAZINE) injection 5 mg  5 mg Intravenous Q6H PRMaci Mayer NP        Or    prochlorperazine (COMPAZINE) tablet 5 mg  5 mg Oral Q6H PRN Maci Guzman NP           Cardiographics:    Telemetry monitoring demonstrates SR with rates in the 60s per my personal review.    Imaging:  Recent Results (from the past 24 hour(s))   GREG with Report    Narrative    Tatyana Neri MD     9/5/2024 10:09 AM  Perioperative GREG Procedure Note    Staff -        Performed By: anesthesiologist  Preanesthesia Checklist:  Patient identified, IV assessed, risks and   benefits discussed, monitors and equipment assessed, procedure being   performed at surgeon's request and anesthesia consent obtained.    GREG Probe Insertion    Probe Status PRE Insertion: NO obvious damage  Probe type:  Adult 3D  Bite block used:   Oral Airway  Insertion Technique: Easy, no oropharyngeal manipulation  Insertion complications: None  obvious  Billing Report:A GREG report is NOT being generated.  Probe Status POST Removal: NO obvious damage         XR Chest Port 1 View    Narrative    EXAM: XR CHEST PORT 1 VIEW  LOCATION: [facility]  DATE: 9/5/2024    INDICATION: Post Op CVTS Surgery  COMPARISON: Chest radiograph 8/28/2024.       Impression    IMPRESSION:     Endotracheal tube tip is 4.0 cm above the chloe. Right internal jugular approach central venous catheter tip is in the mid SVC. Dual mediastinal drains. Prosthetic cardiac valve. Multiple median sternotomy wires.    Lung volumes are low. Streaky opacities at the left lung base likely reflect atelectasis. No visible pleural fluid or pneumothorax. Mild central pulmonary vascular congestion, without overt edema. Nonenlarged cardiac silhouette.   XR Chest Port 1 View    Narrative    EXAM: XR CHEST PORT 1 VIEW  LOCATION: [facility]  DATE: 9/6/2024    INDICATION: Post Op CVTS Surgery  COMPARISON: Chest radiograph 9/5/2024      Impression    IMPRESSION:     Lines and tubes: Interval removal of the endotracheal tube. Similar right central venous catheter tip/sheath, mediastinal drains, median sternotomy and cardiac valve prosthesis.    Trace left basilar atelectasis and possible tiny pleural effusion. No convincing pneumothorax. Similar cardiomediastinal silhouette.                   Labs, personally reviewed.  Hemoglobin   Date Value Ref Range Status   09/06/2024 9.6 (L) 11.7 - 15.7 g/dL Final   09/05/2024 10.7 (L) 11.7 - 15.7 g/dL Final   09/05/2024 8.6 (L) 11.7 - 15.7 g/dL Final   10/03/2012 14.1 12.0 - 16.0 g/dL      Hemoglobin POCT   Date Value Ref Range Status   09/05/2024 9.5 (L) 11.7 - 15.7 g/dL Final   09/05/2024 8.6 (L) 11.7 - 15.7 g/dL Final   09/05/2024 10.7 (L) 11.7 - 15.7 g/dL Final     WBC Count   Date Value Ref Range Status   09/06/2024 8.7 4.0 - 11.0 10e3/uL Final   09/05/2024 14.3 (H) 4.0 - 11.0 10e3/uL Final   09/05/2024 15.6 (H)  4.0 - 11.0 10e3/uL Final     Platelet Count   Date Value Ref Range Status   09/06/2024 92 (L) 150 - 450 10e3/uL Final   09/05/2024 107 (L) 150 - 450 10e3/uL Final   09/05/2024 121 (L) 150 - 450 10e3/uL Final   10/03/2012 241 140 - 440 thou/cu mm      Creatinine   Date Value Ref Range Status   09/06/2024 0.65 0.51 - 0.95 mg/dL Final   09/05/2024 0.68 0.51 - 0.95 mg/dL Final   08/28/2024 0.72 0.51 - 0.95 mg/dL Final     Potassium   Date Value Ref Range Status   09/06/2024 4.3 3.4 - 5.3 mmol/L Final   09/05/2024 4.1 3.4 - 5.3 mmol/L Final   09/05/2024 3.9 3.4 - 5.3 mmol/L Final   09/05/2024 3.9 3.4 - 5.3 mmol/L Final   03/26/2021 3.7 3.5 - 5.0 mmol/L Final     Potassium POCT   Date Value Ref Range Status   09/05/2024 3.7 3.4 - 5.3 mmol/L Final   09/05/2024 3.2 (L) 3.4 - 5.3 mmol/L Final   09/05/2024 4.5 3.4 - 5.3 mmol/L Final     Magnesium   Date Value Ref Range Status   09/06/2024 2.2 1.7 - 2.3 mg/dL Final   09/05/2024 3.2 (H) 1.7 - 2.3 mg/dL Final   08/28/2024 2.2 1.7 - 2.3 mg/dL Final          I/O:  I/O last 3 completed shifts:  In: 7261.95 [P.O.:180; I.V.:3316.95; Other:515; IV Piggyback:2250]  Out: 5325 [Urine:4075; Blood:700; Chest Tube:550]       Physical Exam:    General: Patient seen up in chair, NAD. Conversant, pleasant, calm, cooperative on exam.  HEENT: no sclera icterus, moist mucosa  CV: RRR on monitor. WWP, mild LE edema.  Incision C/D/I, no erythema or induration  Pulm: Unlabored breathing on supplemental oxygen via NC. Chest tubes in place, serosanguinous output, no air leak  Abd: SNTND  : [place] with straw-colored urine  Ext: Mild pedal edema, SCDs in place, warm  Neuro: A&O, CNs grossly intact, face symmetric, speech clear      ASSESSMENT/PLAN:    Maria Ines Molina is a 69 year old female with a history of bicuspid aortic valve and aortic insufficiency with ascending aortic aneurysm who is s/p bioprosthetic AVR and ascending aortic aneurysm repair.    Active Problems:    Aneurysm of ascending aorta  without rupture (H24)        NEURO:  - Scheduled Tylenol/lidocaine patches and PRN Tylenol/oxycodone/dilaudid/ketorolac for pain    CV:  - Normotensive  - Metoprolol 12.5 mg two times a day with hold parameters  - ASA 81mg  - Hold PTA lisinopril  - Statin not indicated  - Chest tubes to remain today  - Diuresis as below  - Will need post-op echo closer to discharge, once chest tubes have been removed    PULM:  - Maintaining oxygen saturations on supplemental oxygen per NC; wean as tolerated  - Encourage pulmonary toilet    GI  NUTRITION:  - Diet: ADAT to regular  - Bowel regimen    RENAL  FE:  - Adequate UOP. Continue to monitor closely via phelps.   - Cr WNL  - Phelps to remain in for close monitoring of I/O and during period of diuresis/relative immobility  - Diuresis with 20mg IV Lasix once today  - Continue electrolyte replacement protocol    HEME:  - Acute blood loss anemia  - Hgb stable, no bleeding concerns. Hep SQ, ASA    ID:  - Dorothy op ppx complete later today, afebrile; no concerns for infection    ENDO:  - Transition to SSI     PPx:  - DVT: SCDs, SQ heparin TID, OOB/ambulation   - GI: Protonix 40mg PO daily    DISPO:  - Continue critical care in ICU for now but likely can transition to general telemetry status later today if remains stable  - Medically Ready for Discharge: Anticipated in 5+ Days        Patient discussed with Dr. Morales.      Maci Guzman, CNP, ACNPC-AG  Cardiothoracic Surgery  [employer] Pager [phone]

## 2024-09-06 NOTE — PLAN OF CARE
Goal Outcome Evaluation:      Plan of Care Reviewed With: patient, spouse    Overall Patient Progress: improvingOverall Patient Progress: improving    Outcome Evaluation: Pt extubated to 3L NC

## 2024-09-06 NOTE — PROGRESS NOTES
Cardiac Rehab       09/06/24 0830   Appointment Info   Signing Clinician's Name / Credentials (OT) Leticia Vyas OTR/L   Living Environment   People in Home spouse   Current Living Arrangements house   Home Accessibility stairs within home   Number of Stairs, Within Home, Primary greater than 10 stairs  (two story home, patient has bedroom on main level if needed)   Stair Railings, Within Home, Primary railings safe and in good condition   Self-Care   Equipment Currently Used at Home none   Fall history within last six months no   Activity/Exercise/Self-Care Comment Patient independent at baseline   General Information   Onset of Illness/Injury or Date of Surgery 09/05/24   Referring Physician Lina Daniel PA-C   Patient/Family Therapy Goal Statement (OT) walk again   Additional Occupational Profile Info/Pertinent History of Current Problem 67 year-old woman with a bicuspid aortic valve with known moderate-severe aortic stenosis, as well as mild aortic regurgitation and an ascending aortic aneurysm measuring 5 cm. Overall, in a good state of health. She underwent an elective aortic valve replacement, ascending aortic replacement and left atrial appendage excision.   Existing Precautions/Restrictions cardiac;sternal   Cognitive Status Examination   Orientation Status orientation to person, place and time   Range of Motion Comprehensive   General Range of Motion no range of motion deficits identified   Strength Comprehensive (MMT)   General Manual Muscle Testing (MMT) Assessment no strength deficits identified   Bed Mobility   Bed Mobility sit-supine   Sit-Supine [place] (Bed Mobility) minimum assist (75% patient effort);2 person assist   Transfers   Transfers sit-stand transfer   Sit-Stand Transfer   Sit-Stand [place] (Transfers) minimum assist (75% patient effort)   Sit/Stand Transfer Comments Patient took steps to bed wtih CGA, patient reporting dizziness with standing, but oxgyen and BP WNL    Activities of Daily Living   BADL Assessment/Intervention lower body dressing   Lower Body Dressing Assessment/Training   [place] Level (Lower Body Dressing) dependent (less than 25% patient effort)   Clinical Impression   Criteria for Skilled Therapeutic Interventions Met (OT) Yes, treatment indicated   OT Diagnosis decreased ADL independence   OT Problem List-Impairments impacting ADL problems related to;activity tolerance impaired;post-surgical precautions;pain   Assessment of Occupational Performance 3-5 Performance Deficits   Identified Performance Deficits trsfs, functional mobiltiy, bed mobility, dressing   Planned Therapy Interventions (OT) ADL retraining;balance training;bed mobility training;transfer training;home program guidelines;progressive activity/exercise;risk factor education   Clinical Decision Making Complexity (OT) problem focused assessment/low complexity   Risk & Benefits of therapy have been explained evaluation/treatment results reviewed;care plan/treatment goals reviewed   OT Total Evaluation Time   OT Eval, Low Complexity Minutes (93386) 15   OT Goals   Therapy Frequency (OT) 2 times/day   OT Predicted Duration/Target Date for Goal Attainment 09/13/24   OT Goals Cardiac Phase 1   OT: Understanding of cardiac education to maximize quality of life, condition management, and health outcomes Patient;Verbalize   OT: Perform aerobic activity with stable cardiovascular response continuous;5 minutes;NuStep   OT: Functional/aerobic ambulation tolerance with stable cardiovascular response in order to return to home and community environment Independent;Greater than 300 feet   OT: Navigation of stairs simulating home set up with stable cardiovascular response in order to return to home and community environment Independent;Greater than 10 stairs   Self-Care/Home Management   Self-Care/Home Mgmt/ADL, Compensatory, Meal Prep Minutes (05077) 8   Treatment Detail/Skilled Intervention Spouse  present, patient alert and oriented, introduced cardiac information including sternal precautions, inpatient cardiac rehab, outpatient cardiac rehab and exercise once at home.   Therapeutic Procedures/Exercise   Therapeutic Procedure: strength, endurance, ROM, flexibillity minutes (54971) 8   Symptoms Noted During/After Treatment none   Treatment Detail/Skilled Intervention Patient completed 5 BLE exercise x10 reps, reporting mild dizziness vitals WNL BP in 110s/60s, HR in 70s   Treatment Time Includes (CR Only) Monitoring of vital signs (see vital signs flowsheet for details)   OT Discharge Planning   OT Plan Ambulate with 4WW   OT Discharge Recommendation (DC Rec) home with assist;home with outpatient cardiac rehab   OT Rationale for DC Rec Patient very independent, and has assistance at discharge if needed.   OT Brief overview of current status Min A for trsfs/bed mobility   Total Session Time   Timed Code Treatment Minutes 16   Total Session Time (sum of timed and untimed services) 31

## 2024-09-06 NOTE — PROGRESS NOTES
Room air SpO2 90 %. BS clear bilat, diminished bilat. Pt performed IS at 1000 ml, flutter valve PEP 5 x 5 minutes, good technique, pt is performing independently. Encouraged pt to continue to perform Q 1 hours w/a as tolerated. RT to follow.

## 2024-09-06 NOTE — PLAN OF CARE
Goal Outcome Evaluation:      Plan of Care Reviewed With: patient    Overall Patient Progress: improvingOverall Patient Progress: improving    Outcome Evaluation: Patient dangled at bedside last evening. Titrated vasoactive drips to achieve BP goals.  [facility] - ICU    RN Progress Note:            Pertinent Assessments:      Please refer to flowsheet rows for full assessment     Patient alert and oriented. Moves all extremities.            Key Events - This Shift:       Toradol, oxycodone and scheduled tylenol or pain.   02 titrated down to 1 liter per NC, sats 95%  Titrated vasoactive drips for BP goals.  Dangled/ stood/ marched in place at HS, tolerated well.   Up to chair this AM, tolerated well.   Taking sips of water and ice chips, BS hypo patient reports passing flatus.                  Barriers to Discharge / Downgrade:     Chest tube, pacer wires, internal jugular line, A-line

## 2024-09-07 ENCOUNTER — APPOINTMENT (OUTPATIENT)
Dept: OCCUPATIONAL THERAPY | Facility: HOSPITAL | Age: 69
DRG: 220 | End: 2024-09-07
Attending: SURGERY
Payer: MEDICARE

## 2024-09-07 ENCOUNTER — APPOINTMENT (OUTPATIENT)
Dept: RADIOLOGY | Facility: HOSPITAL | Age: 69
DRG: 220 | End: 2024-09-07
Attending: THORACIC SURGERY (CARDIOTHORACIC VASCULAR SURGERY)
Payer: MEDICARE

## 2024-09-07 LAB
CA-I BLD-MCNC: 4.6 MG/DL (ref 4.4–5.2)
GLUCOSE BLDC GLUCOMTR-MCNC: 100 MG/DL (ref 70–99)
GLUCOSE BLDC GLUCOMTR-MCNC: 107 MG/DL (ref 70–99)
GLUCOSE BLDC GLUCOMTR-MCNC: 111 MG/DL (ref 70–99)
GLUCOSE BLDC GLUCOMTR-MCNC: 120 MG/DL (ref 70–99)
MAGNESIUM SERPL-MCNC: 2.3 MG/DL (ref 1.7–2.3)
PHOSPHATE SERPL-MCNC: 2.4 MG/DL (ref 2.5–4.5)
POTASSIUM SERPL-SCNC: 4.2 MMOL/L (ref 3.4–5.3)

## 2024-09-07 PROCEDURE — 250N000011 HC RX IP 250 OP 636: Performed by: SURGERY

## 2024-09-07 PROCEDURE — 71045 X-RAY EXAM CHEST 1 VIEW: CPT

## 2024-09-07 PROCEDURE — 250N000013 HC RX MED GY IP 250 OP 250 PS 637: Performed by: SURGERY

## 2024-09-07 PROCEDURE — 210N000001 HC R&B IMCU HEART CARE

## 2024-09-07 PROCEDURE — 82330 ASSAY OF CALCIUM: CPT | Performed by: SURGERY

## 2024-09-07 PROCEDURE — 97110 THERAPEUTIC EXERCISES: CPT | Mod: GO

## 2024-09-07 PROCEDURE — 84100 ASSAY OF PHOSPHORUS: CPT | Performed by: SURGERY

## 2024-09-07 PROCEDURE — 97535 SELF CARE MNGMENT TRAINING: CPT | Mod: GO

## 2024-09-07 PROCEDURE — 84132 ASSAY OF SERUM POTASSIUM: CPT | Performed by: SURGERY

## 2024-09-07 PROCEDURE — 250N000013 HC RX MED GY IP 250 OP 250 PS 637: Performed by: PHYSICIAN ASSISTANT

## 2024-09-07 PROCEDURE — 36415 COLL VENOUS BLD VENIPUNCTURE: CPT | Performed by: SURGERY

## 2024-09-07 PROCEDURE — 94799 UNLISTED PULMONARY SVC/PX: CPT

## 2024-09-07 PROCEDURE — 999N000157 HC STATISTIC RCP TIME EA 10 MIN

## 2024-09-07 PROCEDURE — 83735 ASSAY OF MAGNESIUM: CPT | Performed by: SURGERY

## 2024-09-07 RX ORDER — HYDROMORPHONE HCL IN WATER/PF 6 MG/30 ML
0.2 PATIENT CONTROLLED ANALGESIA SYRINGE INTRAVENOUS EVERY 4 HOURS PRN
Status: DISCONTINUED | OUTPATIENT
Start: 2024-09-07 | End: 2024-09-09

## 2024-09-07 RX ORDER — HYDRALAZINE HYDROCHLORIDE 20 MG/ML
10 INJECTION INTRAMUSCULAR; INTRAVENOUS EVERY 4 HOURS PRN
Status: DISCONTINUED | OUTPATIENT
Start: 2024-09-07 | End: 2024-09-11 | Stop reason: HOSPADM

## 2024-09-07 RX ORDER — FUROSEMIDE 10 MG/ML
20 INJECTION INTRAMUSCULAR; INTRAVENOUS
Status: DISCONTINUED | OUTPATIENT
Start: 2024-09-07 | End: 2024-09-09

## 2024-09-07 RX ORDER — FUROSEMIDE 10 MG/ML
40 INJECTION INTRAMUSCULAR; INTRAVENOUS ONCE
Status: COMPLETED | OUTPATIENT
Start: 2024-09-07 | End: 2024-09-07

## 2024-09-07 RX ADMIN — OXYCODONE HYDROCHLORIDE 5 MG: 5 TABLET ORAL at 23:32

## 2024-09-07 RX ADMIN — PANTOPRAZOLE SODIUM 40 MG: 40 TABLET, DELAYED RELEASE ORAL at 08:49

## 2024-09-07 RX ADMIN — OXYCODONE HYDROCHLORIDE 5 MG: 5 TABLET ORAL at 00:29

## 2024-09-07 RX ADMIN — FUROSEMIDE 20 MG: 10 INJECTION, SOLUTION INTRAMUSCULAR; INTRAVENOUS at 13:05

## 2024-09-07 RX ADMIN — FUROSEMIDE 40 MG: 10 INJECTION, SOLUTION INTRAVENOUS at 09:19

## 2024-09-07 RX ADMIN — METOPROLOL TARTRATE 12.5 MG: 25 TABLET, FILM COATED ORAL at 20:08

## 2024-09-07 RX ADMIN — METOPROLOL TARTRATE 12.5 MG: 25 TABLET, FILM COATED ORAL at 08:50

## 2024-09-07 RX ADMIN — POTASSIUM & SODIUM PHOSPHATES POWDER PACK 280-160-250 MG 1 PACKET: 280-160-250 PACK at 15:59

## 2024-09-07 RX ADMIN — ACETAMINOPHEN 975 MG: 325 TABLET ORAL at 13:07

## 2024-09-07 RX ADMIN — OXYCODONE HYDROCHLORIDE 5 MG: 5 TABLET ORAL at 06:08

## 2024-09-07 RX ADMIN — POLYETHYLENE GLYCOL 3350 17 G: 17 POWDER, FOR SOLUTION ORAL at 08:49

## 2024-09-07 RX ADMIN — HEPARIN SODIUM 5000 UNITS: 10000 INJECTION, SOLUTION INTRAVENOUS; SUBCUTANEOUS at 06:09

## 2024-09-07 RX ADMIN — POTASSIUM & SODIUM PHOSPHATES POWDER PACK 280-160-250 MG 1 PACKET: 280-160-250 PACK at 08:49

## 2024-09-07 RX ADMIN — OXYCODONE HYDROCHLORIDE 5 MG: 5 TABLET ORAL at 17:35

## 2024-09-07 RX ADMIN — SENNOSIDES AND DOCUSATE SODIUM 1 TABLET: 8.6; 5 TABLET ORAL at 20:02

## 2024-09-07 RX ADMIN — ASPIRIN 81 MG CHEWABLE TABLET 81 MG: 81 TABLET CHEWABLE at 08:49

## 2024-09-07 RX ADMIN — SENNOSIDES AND DOCUSATE SODIUM 1 TABLET: 8.6; 5 TABLET ORAL at 09:19

## 2024-09-07 RX ADMIN — ACETAMINOPHEN 975 MG: 325 TABLET ORAL at 04:52

## 2024-09-07 RX ADMIN — ACETAMINOPHEN 975 MG: 325 TABLET ORAL at 20:02

## 2024-09-07 RX ADMIN — Medication 1 TABLET: at 08:50

## 2024-09-07 RX ADMIN — HEPARIN SODIUM 5000 UNITS: 10000 INJECTION, SOLUTION INTRAVENOUS; SUBCUTANEOUS at 13:06

## 2024-09-07 RX ADMIN — LIDOCAINE 2 PATCH: 4 PATCH TOPICAL at 13:05

## 2024-09-07 RX ADMIN — OXYCODONE HYDROCHLORIDE 5 MG: 5 TABLET ORAL at 13:05

## 2024-09-07 RX ADMIN — HEPARIN SODIUM 5000 UNITS: 10000 INJECTION, SOLUTION INTRAVENOUS; SUBCUTANEOUS at 21:40

## 2024-09-07 RX ADMIN — POTASSIUM & SODIUM PHOSPHATES POWDER PACK 280-160-250 MG 1 PACKET: 280-160-250 PACK at 13:04

## 2024-09-07 ASSESSMENT — ACTIVITIES OF DAILY LIVING (ADL)
ADLS_ACUITY_SCORE: 29
ADLS_ACUITY_SCORE: 25
ADLS_ACUITY_SCORE: 29
ADLS_ACUITY_SCORE: 29
ADLS_ACUITY_SCORE: 24
ADLS_ACUITY_SCORE: 29
ADLS_ACUITY_SCORE: 25
ADLS_ACUITY_SCORE: 31
ADLS_ACUITY_SCORE: 30
ADLS_ACUITY_SCORE: 31
ADLS_ACUITY_SCORE: 29
ADLS_ACUITY_SCORE: 25
ADLS_ACUITY_SCORE: 31
ADLS_ACUITY_SCORE: 30
ADLS_ACUITY_SCORE: 31
ADLS_ACUITY_SCORE: 29
ADLS_ACUITY_SCORE: 31
ADLS_ACUITY_SCORE: 29
ADLS_ACUITY_SCORE: 31
ADLS_ACUITY_SCORE: 29
ADLS_ACUITY_SCORE: 24

## 2024-09-07 NOTE — PLAN OF CARE
Problem: Adult Inpatient Plan of Care  Goal: Plan of Care Review  Description: The Plan of Care Review/Shift note should be completed every shift.  The Outcome Evaluation is a brief statement about your assessment that the patient is improving, declining, or no change.  This information will be displayed automatically on your shift  note.  Outcome: Progressing   Pt A& O x 3. VSS. Lung sound diminished. Chest tube in place (draining )on suction. Encourage IS and flutter valve. Chest incision approximated. On oxygen 2 lpm via N/C. Reports incision pain on chest-oxycodone and tylenol. Maintain sternal precaution. Patient phelps Cath out- she is continent-voiding adequately. Patient ambulate in room and hallway with on assist and walker. Monitor

## 2024-09-07 NOTE — PROGRESS NOTES
CVTS Daily Progress Note   POD 2 s/p bioprosthetic AVR, ascending aortic aneurysm repair, and BRITTNEY excision  Attending: Andrew  LOS: 2    SUBJECTIVE/INTERVAL EVENTS:    Transferred out of ICU overnight, CXR performed overnight for concern of chest tube not being placed to suction following transfer. Patient progressing well. Maintaining oxygen saturations on supplemental oxygen via NC. Normotensive. OOB, pain well controlled. -BM / +flatus. Tolerating PO intake. UOP adequate. Chest tube output appropriate. Hgb stable. Patient denies new chest pain, shortness of breath, abdominal pain, and nausea. Continues to complain of feeling some chest congestion/phlegm that is difficult to get up and out but declines Mucinex as she states it makes her mucous membranes feel dry.    OBJECTIVE:  Temp:  [97.6  F (36.4  C)-98.9  F (37.2  C)] 98  F (36.7  C)  Pulse:  [64-80] 71  Resp:  [16-22] 16  BP: ()/(55-68) 105/68  SpO2:  [89 %-99 %] 95 %  Vitals:    09/05/24 0523 09/06/24 0615 09/07/24 0608   Weight: 93 kg (205 lb) 99 kg (218 lb 4.8 oz) 99.5 kg (219 lb 6.4 oz)       Clinically Significant Risk Factors          # Hypocalcemia: Lowest Ca = 7.7 mg/dL in last 2 days, will monitor and replace as appropriate     # Hypoalbuminemia: Lowest albumin = 3.4 g/dL at 9/6/2024  4:16 AM, will monitor as appropriate    # Coagulation Defect: INR = 1.48 (Ref range: 0.85 - 1.15) and/or PTT = 92 Seconds (Ref range: 22 - 38 Seconds), will monitor for bleeding  # Thrombocytopenia: Lowest platelets = 92 in last 2 days, will monitor for bleeding                       Current Medications:    Scheduled Meds:  Current Facility-Administered Medications   Medication Dose Route Frequency Provider Last Rate Last Admin    acetaminophen (TYLENOL) Suppository 650 mg  650 mg Rectal Q8H Maci Guzman NP   650 mg at 09/05/24 1329    acetaminophen (TYLENOL) tablet 975 mg  975 mg Oral Q8H Maci Guzman NP   975 mg at 09/07/24 1307    aspirin (ASA)  chewable tablet 81 mg  81 mg Oral or NG Tube Daily Maci Guzman NP   81 mg at 09/07/24 0849    furosemide (LASIX) injection 20 mg  20 mg Intravenous bid 08 & 14 Maci Guzman NP   20 mg at 09/07/24 1305    heparin ANTICOAGULANT injection 5,000 Units  5,000 Units Subcutaneous Q8H Maci Guzman NP   5,000 Units at 09/07/24 1306    insulin aspart (NovoLOG) injection (RAPID ACTING)  1-7 Units Subcutaneous TID AC Maci Guzman NP        insulin aspart (NovoLOG) injection (RAPID ACTING)  1-5 Units Subcutaneous At Bedtime Maci Guzman NP        Lidocaine (LIDOCARE) 4 % Patch 1-2 patch  1-2 patch Transdermal Q24H Maci Guzman NP   2 patch at 09/07/24 1305    metoprolol tartrate (LOPRESSOR) half-tab 12.5 mg  12.5 mg Oral BID Maci Guzman NP   12.5 mg at 09/07/24 0850    multivitamin w/minerals (THERA-VIT-M) tablet 1 tablet  1 tablet Oral Daily Maci Guzman NP   1 tablet at 09/07/24 0850    pantoprazole (PROTONIX) EC tablet 40 mg  40 mg Oral Daily Maci Guzman NP   40 mg at 09/07/24 0849    polyethylene glycol (MIRALAX) Packet 17 g  17 g Oral Daily Maci Guzman NP   17 g at 09/07/24 0849    potassium & sodium phosphates (NEUTRA-PHOS) Packet 1 packet  1 packet Oral or Feeding Tube Q4H Alfredo Morales MD   1 packet at 09/07/24 1304    senna-docusate (SENOKOT-S/PERICOLACE) 8.6-50 MG per tablet 1 tablet  1 tablet Oral BID Maci Guzman NP   1 tablet at 09/07/24 0919     Continuous Infusions:  Current Facility-Administered Medications   Medication Dose Route Frequency Provider Last Rate Last Admin    Med Instruction - Transition from IV Insulin Infusion to Sub-Q Insulin   Does not apply Continuous PRN Maci Guzman NP         PRN Meds:  Current Facility-Administered Medications   Medication Dose Route Frequency Provider Last Rate Last Admin    [START ON 9/8/2024] acetaminophen (TYLENOL) tablet 650 mg  650 mg Oral Q4H PRN Maci Guzman, RUIZ        bisacodyl  (DULCOLAX) suppository 10 mg  10 mg Rectal Daily PRN Maci Guzman NP        calcium gluconate 1 g in 50 mL in sodium chloride intermittent infusion  1 g Intravenous Once PRMaci Mayer NP   1 g at 09/05/24 1358    calcium gluconate 2 g in  mL intermittent infusion  2 g Intravenous Once PRN Maci Guzman NP        cyclobenzaprine (FLEXERIL) tablet 10 mg  10 mg Oral TID PRN Maci Guzman NP        glucose gel 15-30 g  15-30 g Oral Q15 Min PRMaci Mayer NP        Or    dextrose 50 % injection 25-50 mL  25-50 mL Intravenous Q15 Min Maci Lott NP        Or    glucagon injection 1 mg  1 mg Subcutaneous Q15 Min PRMaci Mayer NP        hydrALAZINE (APRESOLINE) injection 10 mg  10 mg Intravenous Q4H PRMaci Mayer NP        HYDROmorphone (DILAUDID) injection 0.2 mg  0.2 mg Intravenous Q4H PRMaci Mayer NP        ketorolac (TORADOL) injection 15 mg  15 mg Intravenous Q6H PRMaci Mayer NP   15 mg at 09/06/24 1758    magnesium hydroxide (MILK OF MAGNESIA) suspension 30 mL  30 mL Oral Daily PRMaci Mayer NP        Med Instruction - Transition from IV Insulin Infusion to Sub-Q Insulin   Does not apply Continuous PRMaci Mayer NP        naloxone (NARCAN) injection 0.2 mg  0.2 mg Intravenous Q2 Min PRMaci Mayer NP        Or    naloxone (NARCAN) injection 0.4 mg  0.4 mg Intravenous Q2 Min PRMaci Mayer NP        Or    naloxone (NARCAN) injection 0.2 mg  0.2 mg Intramuscular Q2 Min PRMaci Mayer NP        Or    naloxone (NARCAN) injection 0.4 mg  0.4 mg Intramuscular Q2 Min PRMaci Mayer NP        ondansetron (ZOFRAN ODT) ODT tab 4 mg  4 mg Oral Q6H PRMaci Mayer, NP        Or    ondansetron (ZOFRAN) injection 4 mg  4 mg Intravenous Q6H Maci Lott NP   4 mg at 09/05/24 1842    oxyCODONE (ROXICODONE) tablet 5 mg  5 mg Oral Q4H Maci Lott NP   5 mg at 09/07/24 1305    Or    oxyCODONE  (ROXICODONE) tablet 10 mg  10 mg Oral Q4H PRN Maci Guzman NP   10 mg at 09/06/24 1920    prochlorperazine (COMPAZINE) injection 5 mg  5 mg Intravenous Q6H PRN Maci Guzman NP        Or    prochlorperazine (COMPAZINE) tablet 5 mg  5 mg Oral Q6H PRN Maci Guzman NP           Cardiographics:    Telemetry monitoring demonstrates SR with rates in the 60-70s per my personal review.    Imaging:  Recent Results (from the past 24 hour(s))   XR Chest Port 1 View    Narrative    EXAM: XR CHEST PORT 1 VIEW  LOCATION: [facility]  DATE: 9/7/2024    INDICATION: chest tube disconnected from suction  COMPARISON: May 6, 2024      Impression    IMPRESSION: Interval removal of right IJ central venous catheter/sheath. Stable mediastinal drains, median sternotomy wires, and aortic valve prosthesis. No pneumothorax. Unchanged left basilar opacity. Stable cardiomediastinal silhouette.       Labs, personally reviewed.  Hemoglobin   Date Value Ref Range Status   09/06/2024 9.6 (L) 11.7 - 15.7 g/dL Final   09/05/2024 10.7 (L) 11.7 - 15.7 g/dL Final   09/05/2024 8.6 (L) 11.7 - 15.7 g/dL Final   10/03/2012 14.1 12.0 - 16.0 g/dL      Hemoglobin POCT   Date Value Ref Range Status   09/05/2024 9.5 (L) 11.7 - 15.7 g/dL Final   09/05/2024 8.6 (L) 11.7 - 15.7 g/dL Final   09/05/2024 10.7 (L) 11.7 - 15.7 g/dL Final     WBC Count   Date Value Ref Range Status   09/06/2024 8.7 4.0 - 11.0 10e3/uL Final   09/05/2024 14.3 (H) 4.0 - 11.0 10e3/uL Final   09/05/2024 15.6 (H) 4.0 - 11.0 10e3/uL Final     Platelet Count   Date Value Ref Range Status   09/06/2024 92 (L) 150 - 450 10e3/uL Final   09/05/2024 107 (L) 150 - 450 10e3/uL Final   09/05/2024 121 (L) 150 - 450 10e3/uL Final   10/03/2012 241 140 - 440 thou/cu mm      Creatinine   Date Value Ref Range Status   09/06/2024 0.65 0.51 - 0.95 mg/dL Final   09/05/2024 0.68 0.51 - 0.95 mg/dL Final   08/28/2024 0.72 0.51 - 0.95 mg/dL Final     Potassium   Date Value Ref  Range Status   09/07/2024 4.2 3.4 - 5.3 mmol/L Final   09/06/2024 4.3 3.4 - 5.3 mmol/L Final   09/05/2024 4.1 3.4 - 5.3 mmol/L Final   03/26/2021 3.7 3.5 - 5.0 mmol/L Final     Potassium POCT   Date Value Ref Range Status   09/05/2024 3.7 3.4 - 5.3 mmol/L Final   09/05/2024 3.2 (L) 3.4 - 5.3 mmol/L Final   09/05/2024 4.5 3.4 - 5.3 mmol/L Final     Magnesium   Date Value Ref Range Status   09/07/2024 2.3 1.7 - 2.3 mg/dL Final   09/06/2024 2.2 1.7 - 2.3 mg/dL Final   09/05/2024 3.2 (H) 1.7 - 2.3 mg/dL Final          I/O:  I/O last 3 completed shifts:  In: 1667.4 [P.O.:1180; I.V.:487.4]  Out: 1386 [Urine:950; Chest Tube:436]       Physical Exam:    General: Patient seen up in chair, NAD. Conversant, pleasant, calm, cooperative on exam.  HEENT: no sclera icterus, moist mucosa  CV: RRR on monitor. WWP, mild LE edema.  Incision C/D/I, no erythema or induration  Pulm: Unlabored breathing on supplemental oxygen via NC. Chest tubes in place, serosanguinous output, no air leak  Abd: SNTND  Ext: Mild pedal edema, warm  Neuro: A&O, CNs grossly intact, face symmetric, speech clear      ASSESSMENT/PLAN:    Maria Ines Molina is a 69 year old female with a history of bicuspid aortic valve and aortic insufficiency with ascending aortic aneurysm who is s/p bioprosthetic AVR and ascending aortic aneurysm repair.    Active Problems:    Aneurysm of ascending aorta without rupture (H24)        NEURO:  - Scheduled Tylenol/lidocaine patches and PRN Tylenol/oxycodone/dilaudid/ketorolac for pain    CV:  - Normotensive  - Metoprolol 12.5 mg two times a day with hold parameters  - ASA 81mg  - Hold PTA lisinopril  - Statin not indicated  - Chest tubes to remain today  - Diuresis as below  - Will need post-op echo closer to discharge, once chest tubes have been removed    PULM:  - Maintaining oxygen saturations on supplemental oxygen per NC; wean as tolerated  - Encourage pulmonary toilet    GI  NUTRITION:  - Diet: ADAT to regular  - Bowel  regimen    RENAL  FE:  - Adequate UOP  - Cr WNL  - Newsome removed early this morning  - Diuresis with 40mg IV Lasix once this morning followed by 20mg IV this afternoon  - Continue electrolyte replacement protocol    HEME:  - Acute blood loss anemia  - Hgb stable, no bleeding concerns. Hep SQ, ASA    ID:  - Dorothy op ppx complete, afebrile; no concerns for infection    ENDO:  - Transition to SSI  - look to discontinue over next 24 hrs if maintaining euglycemia without need for supplemental insulin    PPx:  - DVT: SCDs, SQ heparin TID, OOB/ambulation   - GI: Protonix 40mg PO daily    DISPO:  - General telemetry status  - Medically Ready for Discharge: Anticipated in 2-4 Days        Patient seen and discussed with Dr. Morales.      Maci Guzman, CNP, [facility]-  Cardiothoracic Surgery  [facility] Pager [phone]

## 2024-09-07 NOTE — TREATMENT PLAN
/79 (BP Location: Left arm)   Pulse 68   Temp 98.8  F (37.1  C) (Oral)   Resp 18   Wt 99.5 kg (219 lb 6.4 oz)   SpO2 96%   BMI 36.51 kg/m         Intake/Output Summary (Last 24 hours) at 9/7/2024 1615  Last data filed at 9/7/2024 1330  Gross per 24 hour   Intake 1030 ml   Output 2096 ml   Net -1066 ml       No Known Allergies     RCAT Treatment Plan    Patient Score: 8  Patient Acuity: 4    Clinical Indication for Therapy: prevent atelectasis- s/p CVTC surgery    Therapy Ordered: Aerobika QID - reviewed technique with pt. IS - QID(reviewed technique with pt). Pt able to use devices on own. Pt has no questions regarding use or technique.     Assessment Summary: Good voicing. Pt alert and oriented. NPC strong cough. Pt denies SOB. Pt speaks in complete sentences. Pt on RA. No accessory muscle use. No nasal flaring. No retractions. Pt achieveing 6604-7976 mls with IS.      Pt technique reviewed for both IS & Aerobika. Pt able to use both effectively. Pt agreeable to using both devices on own QID. Pt will call if she needs assistance or has quesitons or concerns.           Billy Duron, RT  9/7/2024

## 2024-09-07 NOTE — PLAN OF CARE
sc  Patient Name: Maria Ines Molina   MRN: [id number]   Date of Admission: 9/5/2024    Procedure: Procedure(s):  AORTIC VALVE REPLACEMENT AND ASCENDING AORTIC REPLACEMENT, LEFT ATRIAL APPENDAGE EXCISION, EPIAORTIC ULTRASOUND  ANESTHESIA TRANSESOPHAGEAL ECHOCARDIOGRAM    Post Op day #:1    Subjective (Patient focus/Primary Problem for shift): Pain management/ movement          Pain Goal 2 Pain Rating 5           Pain Medication/ Regime effective to reduce patient painYes    Objective (Physical assessment):           Rhythm: normal sinus rhythm            Bowel Activity: no if Yes indicate when:           Bowel Medications: yes            Incision: healing well          Incentive Spirometry Q 1-2 hour when awake:  yes Volume: 1000            Epicardial Pacing Wires:  yes            Patient Activity:           Up to chair for meals: no          Ambulation with RN x2 (Not including CR): no            Is patient in home clothes:no             Chest Tubes   Pleural: No Draining: No               Suction: yes              Mediastinal: yes Draining: yes               Suction: yes   Dressing Change Daily:yes If No, why?Done on day shift.                     Urinary Catheter: yes           Preventative WOC consult (need MD order): no       Assessment (Nursing primary shift focus):     Plan (Patient Care Plan/focus): Pain management, movement and elimination.      Augustina Diane RN   9/6/2024   7:53 PM       Goal Outcome Evaluation:  Pt is alert and oriented x 4.  She transferred from ICU today at 1630.     Pt reports feeling puffy but was given IV lasix today.     Blood sugars are with meals and HS. This shift was 115 and 113, no sliding scale coverage given.     Cardiac rhythm is NSR.     Protocols for mag, K+, phos.

## 2024-09-07 NOTE — PLAN OF CARE
Problem: Risk for Delirium  Goal: Improved Sleep  Outcome: Progressing  Intervention: Promote Sleep  Recent Flowsheet Documentation  Taken 9/7/2024 0437 by Tatyana Morrison RN  Sleep/Rest Enhancement:   noise level reduced   regular sleep/rest pattern promoted  Problem: Adult Inpatient Plan of Care  Goal: Optimal Comfort and Wellbeing  Outcome: Progressing  Intervention: Monitor Pain and Promote Comfort  Recent Flowsheet Documentation  Taken 9/7/2024 0608 by Tatyana Morrison RN  Intervention: Provide Person-Centered Care  Recent Flowsheet Documentation  Taken 9/7/2024 0437 by Tatyana Morrison RN  Trust Relationship/Rapport:   questions answered   questions encouraged   care explained   Goal Outcome Evaluation:    Pt restful over noc, reports sleeping well. Pain well controlled. Chest incision WNL. Pacer wires capped. Chest tubes placed to suction. Intermittent airleak observed. 176 ml total output from chest tube over noc. RN found pt in bed at midnight assessment without chest tubes connected to suction. No suction set up in the room. CV surgery updated, CXR obtained. Placed on O2 at 2L to maintain O2 sats >90, was able to wean down to 1L this am. Newsome pulled this am. Pt up to chair with minimal assist this am. Passing flatus. NSR on tele. BP soft at midnight, pt asymptomatic, bp normalized without intervention. Weight up one pound this am, mild edema to hands and feet. Pt using IS independently.

## 2024-09-08 ENCOUNTER — APPOINTMENT (OUTPATIENT)
Dept: OCCUPATIONAL THERAPY | Facility: HOSPITAL | Age: 69
DRG: 220 | End: 2024-09-08
Attending: SURGERY
Payer: MEDICARE

## 2024-09-08 LAB
ANION GAP SERPL CALCULATED.3IONS-SCNC: 8 MMOL/L (ref 7–15)
BUN SERPL-MCNC: 13.9 MG/DL (ref 8–23)
CA-I BLD-MCNC: 4.5 MG/DL (ref 4.4–5.2)
CALCIUM SERPL-MCNC: 8.5 MG/DL (ref 8.8–10.4)
CHLORIDE SERPL-SCNC: 99 MMOL/L (ref 98–107)
CREAT SERPL-MCNC: 0.7 MG/DL (ref 0.51–0.95)
EGFRCR SERPLBLD CKD-EPI 2021: >90 ML/MIN/1.73M2
GLUCOSE BLDC GLUCOMTR-MCNC: 111 MG/DL (ref 70–99)
GLUCOSE SERPL-MCNC: 110 MG/DL (ref 70–99)
HCO3 SERPL-SCNC: 30 MMOL/L (ref 22–29)
MAGNESIUM SERPL-MCNC: 2.1 MG/DL (ref 1.7–2.3)
PHOSPHATE SERPL-MCNC: 2.1 MG/DL (ref 2.5–4.5)
POTASSIUM SERPL-SCNC: 4.1 MMOL/L (ref 3.4–5.3)
SODIUM SERPL-SCNC: 137 MMOL/L (ref 135–145)

## 2024-09-08 PROCEDURE — 250N000011 HC RX IP 250 OP 636: Performed by: SURGERY

## 2024-09-08 PROCEDURE — 97535 SELF CARE MNGMENT TRAINING: CPT | Mod: GO

## 2024-09-08 PROCEDURE — 250N000009 HC RX 250: Performed by: SURGERY

## 2024-09-08 PROCEDURE — 258N000003 HC RX IP 258 OP 636: Performed by: SURGERY

## 2024-09-08 PROCEDURE — 210N000001 HC R&B IMCU HEART CARE

## 2024-09-08 PROCEDURE — 84100 ASSAY OF PHOSPHORUS: CPT | Performed by: SURGERY

## 2024-09-08 PROCEDURE — 83735 ASSAY OF MAGNESIUM: CPT | Performed by: SURGERY

## 2024-09-08 PROCEDURE — 82330 ASSAY OF CALCIUM: CPT | Performed by: SURGERY

## 2024-09-08 PROCEDURE — 97110 THERAPEUTIC EXERCISES: CPT | Mod: GO

## 2024-09-08 PROCEDURE — 36415 COLL VENOUS BLD VENIPUNCTURE: CPT | Performed by: SURGERY

## 2024-09-08 PROCEDURE — 80048 BASIC METABOLIC PNL TOTAL CA: CPT | Performed by: SURGERY

## 2024-09-08 PROCEDURE — 250N000013 HC RX MED GY IP 250 OP 250 PS 637: Performed by: SURGERY

## 2024-09-08 RX ORDER — METOPROLOL TARTRATE 25 MG/1
25 TABLET, FILM COATED ORAL 2 TIMES DAILY
Status: DISCONTINUED | OUTPATIENT
Start: 2024-09-08 | End: 2024-09-09

## 2024-09-08 RX ADMIN — POLYETHYLENE GLYCOL 3350 17 G: 17 POWDER, FOR SOLUTION ORAL at 08:46

## 2024-09-08 RX ADMIN — ASPIRIN 81 MG CHEWABLE TABLET 81 MG: 81 TABLET CHEWABLE at 08:47

## 2024-09-08 RX ADMIN — ACETAMINOPHEN 975 MG: 325 TABLET ORAL at 21:42

## 2024-09-08 RX ADMIN — HEPARIN SODIUM 5000 UNITS: 10000 INJECTION, SOLUTION INTRAVENOUS; SUBCUTANEOUS at 21:42

## 2024-09-08 RX ADMIN — METOPROLOL TARTRATE 12.5 MG: 25 TABLET, FILM COATED ORAL at 08:46

## 2024-09-08 RX ADMIN — SENNOSIDES AND DOCUSATE SODIUM 1 TABLET: 8.6; 5 TABLET ORAL at 08:46

## 2024-09-08 RX ADMIN — ACETAMINOPHEN 975 MG: 325 TABLET ORAL at 05:22

## 2024-09-08 RX ADMIN — SENNOSIDES AND DOCUSATE SODIUM 1 TABLET: 8.6; 5 TABLET ORAL at 21:42

## 2024-09-08 RX ADMIN — HEPARIN SODIUM 5000 UNITS: 10000 INJECTION, SOLUTION INTRAVENOUS; SUBCUTANEOUS at 15:32

## 2024-09-08 RX ADMIN — LIDOCAINE 1 PATCH: 4 PATCH TOPICAL at 15:34

## 2024-09-08 RX ADMIN — OXYCODONE HYDROCHLORIDE 5 MG: 5 TABLET ORAL at 18:19

## 2024-09-08 RX ADMIN — PANTOPRAZOLE SODIUM 40 MG: 40 TABLET, DELAYED RELEASE ORAL at 08:46

## 2024-09-08 RX ADMIN — FUROSEMIDE 20 MG: 10 INJECTION, SOLUTION INTRAMUSCULAR; INTRAVENOUS at 08:47

## 2024-09-08 RX ADMIN — FUROSEMIDE 20 MG: 10 INJECTION, SOLUTION INTRAMUSCULAR; INTRAVENOUS at 15:31

## 2024-09-08 RX ADMIN — SODIUM PHOSPHATE, MONOBASIC, MONOHYDRATE AND SODIUM PHOSPHATE, DIBASIC, ANHYDROUS 15 MMOL: 142; 276 INJECTION, SOLUTION INTRAVENOUS at 17:44

## 2024-09-08 RX ADMIN — Medication 1 TABLET: at 08:46

## 2024-09-08 RX ADMIN — HEPARIN SODIUM 5000 UNITS: 10000 INJECTION, SOLUTION INTRAVENOUS; SUBCUTANEOUS at 05:22

## 2024-09-08 RX ADMIN — ACETAMINOPHEN 975 MG: 325 TABLET ORAL at 12:11

## 2024-09-08 RX ADMIN — OXYCODONE HYDROCHLORIDE 5 MG: 5 TABLET ORAL at 06:17

## 2024-09-08 RX ADMIN — METOPROLOL TARTRATE 25 MG: 25 TABLET, FILM COATED ORAL at 21:42

## 2024-09-08 RX ADMIN — OXYCODONE HYDROCHLORIDE 5 MG: 5 TABLET ORAL at 12:11

## 2024-09-08 ASSESSMENT — ACTIVITIES OF DAILY LIVING (ADL)
ADLS_ACUITY_SCORE: 24
ADLS_ACUITY_SCORE: 25
ADLS_ACUITY_SCORE: 25
ADLS_ACUITY_SCORE: 24
ADLS_ACUITY_SCORE: 25
ADLS_ACUITY_SCORE: 24

## 2024-09-08 NOTE — PROGRESS NOTES
CVTS Daily Progress Note   POD 3 s/p bioprosthetic AVR, ascending aortic aneurysm repair, and BRITTNEY excision  Attending: Andrew  LOS: 3    SUBJECTIVE/INTERVAL EVENTS:    NAEO, patient progressing well. Maintaining oxygen saturations on RA. Normotensive. OOB, pain well controlled. -BM / +flatus. Tolerating PO intake. UOP adequate. Chest tube output appropriate. Patient denies new chest pain, shortness of breath, abdominal pain, and nausea. Asks when she might be able to discharge home and inquires about afib mitigation.    OBJECTIVE:  Temp:  [97.8  F (36.6  C)-99.1  F (37.3  C)] 98.4  F (36.9  C)  Pulse:  [66-77] 77  Resp:  [18-20] 18  BP: (114-140)/(67-92) 125/75  SpO2:  [92 %-96 %] 96 %  Vitals:    09/05/24 0523 09/06/24 0615 09/07/24 0608 09/08/24 0458   Weight: 93 kg (205 lb) 99 kg (218 lb 4.8 oz) 99.5 kg (219 lb 6.4 oz) 97.7 kg (215 lb 4.8 oz)       Clinically Significant Risk Factors              # Hypoalbuminemia: Lowest albumin = 3.4 g/dL at 9/6/2024  4:16 AM, will monitor as appropriate                           Current Medications:    Scheduled Meds:  Current Facility-Administered Medications   Medication Dose Route Frequency Provider Last Rate Last Admin    acetaminophen (TYLENOL) tablet 975 mg  975 mg Oral Q8H Maci Guzman NP   975 mg at 09/08/24 1211    aspirin (ASA) chewable tablet 81 mg  81 mg Oral or NG Tube Daily Maci Guzman NP   81 mg at 09/08/24 0847    furosemide (LASIX) injection 20 mg  20 mg Intravenous bid 08 & 14 Maci Guzman NP   20 mg at 09/08/24 1531    heparin ANTICOAGULANT injection 5,000 Units  5,000 Units Subcutaneous Q8H Maci Guzman NP   5,000 Units at 09/08/24 1532    Lidocaine (LIDOCARE) 4 % Patch 1-2 patch  1-2 patch Transdermal Q24H Maci Guzman NP   1 patch at 09/08/24 1534    metoprolol tartrate (LOPRESSOR) half-tab 12.5 mg  12.5 mg Oral BID Maci Guzman NP   12.5 mg at 09/08/24 0846    multivitamin w/minerals (THERA-VIT-M) tablet 1 tablet  1  tablet Oral Daily Maci Guzman NP   1 tablet at 09/08/24 0846    pantoprazole (PROTONIX) EC tablet 40 mg  40 mg Oral Daily Maci Guzman NP   40 mg at 09/08/24 0846    polyethylene glycol (MIRALAX) Packet 17 g  17 g Oral Daily Maci Guzman NP   17 g at 09/08/24 0846    senna-docusate (SENOKOT-S/PERICOLACE) 8.6-50 MG per tablet 1 tablet  1 tablet Oral BID Maci Guzman NP   1 tablet at 09/08/24 0846    sodium phosphate 15 mmol in sodium chloride 0.9 % 250 mL intermittent infusion  15 mmol Intravenous Once Alfredo Morales MD   15 mmol at 09/08/24 1744     Continuous Infusions:  Current Facility-Administered Medications   Medication Dose Route Frequency Provider Last Rate Last Admin    Med Instruction - Transition from IV Insulin Infusion to Sub-Q Insulin   Does not apply Continuous PRMaci Mayer NP         PRN Meds:  Current Facility-Administered Medications   Medication Dose Route Frequency Provider Last Rate Last Admin    acetaminophen (TYLENOL) tablet 650 mg  650 mg Oral Q4H PRN Maci Guzman NP        bisacodyl (DULCOLAX) suppository 10 mg  10 mg Rectal Daily PRN Maci Guzman NP        calcium gluconate 1 g in 50 mL in sodium chloride intermittent infusion  1 g Intravenous Once PRN Maci Guzman NP   1 g at 09/05/24 1358    calcium gluconate 2 g in  mL intermittent infusion  2 g Intravenous Once PRN Maci Guzman NP        cyclobenzaprine (FLEXERIL) tablet 10 mg  10 mg Oral TID PRN Maci Guzman NP        glucose gel 15-30 g  15-30 g Oral Q15 Min PRN Maci Guzman NP        Or    dextrose 50 % injection 25-50 mL  25-50 mL Intravenous Q15 Min PRMaci Mayer NP        Or    glucagon injection 1 mg  1 mg Subcutaneous Q15 Min PRN Maci Guzman NP        hydrALAZINE (APRESOLINE) injection 10 mg  10 mg Intravenous Q4H PRN Maci Guzman NP        HYDROmorphone (DILAUDID) injection 0.2 mg  0.2 mg Intravenous Q4H ESTEFANYN Maci Guzman, NP         magnesium hydroxide (MILK OF MAGNESIA) suspension 30 mL  30 mL Oral Daily PRN Maci Guzman NP        Med Instruction - Transition from IV Insulin Infusion to Sub-Q Insulin   Does not apply Continuous PRN Maci Guzman NP        naloxone (NARCAN) injection 0.2 mg  0.2 mg Intravenous Q2 Min PRN Maci Guzman NP        Or    naloxone (NARCAN) injection 0.4 mg  0.4 mg Intravenous Q2 Min PRMaci Mayer NP        Or    naloxone (NARCAN) injection 0.2 mg  0.2 mg Intramuscular Q2 Min PRN Maci Guzman NP        Or    naloxone (NARCAN) injection 0.4 mg  0.4 mg Intramuscular Q2 Min PRMaci Mayer NP        ondansetron (ZOFRAN ODT) ODT tab 4 mg  4 mg Oral Q6H PRN Maci Guzman NP        Or    ondansetron (ZOFRAN) injection 4 mg  4 mg Intravenous Q6H PRMaci Mayer NP   4 mg at 09/05/24 1842    oxyCODONE (ROXICODONE) tablet 5 mg  5 mg Oral Q4H PRMaci Mayer NP   5 mg at 09/08/24 1819    Or    oxyCODONE (ROXICODONE) tablet 10 mg  10 mg Oral Q4H PRMaci Mayer NP   10 mg at 09/06/24 1920       Cardiographics:    Telemetry monitoring demonstrates SR with rates in the 60-70s per my personal review.    Imaging:  No results found for this or any previous visit (from the past 24 hour(s)).      Labs, personally reviewed.  Hemoglobin   Date Value Ref Range Status   09/06/2024 9.6 (L) 11.7 - 15.7 g/dL Final   09/05/2024 10.7 (L) 11.7 - 15.7 g/dL Final   09/05/2024 8.6 (L) 11.7 - 15.7 g/dL Final   10/03/2012 14.1 12.0 - 16.0 g/dL      Hemoglobin POCT   Date Value Ref Range Status   09/05/2024 9.5 (L) 11.7 - 15.7 g/dL Final   09/05/2024 8.6 (L) 11.7 - 15.7 g/dL Final   09/05/2024 10.7 (L) 11.7 - 15.7 g/dL Final     WBC Count   Date Value Ref Range Status   09/06/2024 8.7 4.0 - 11.0 10e3/uL Final   09/05/2024 14.3 (H) 4.0 - 11.0 10e3/uL Final   09/05/2024 15.6 (H) 4.0 - 11.0 10e3/uL Final     Platelet Count   Date Value Ref Range Status   09/06/2024 92 (L) 150 - 450 10e3/uL Final    09/05/2024 107 (L) 150 - 450 10e3/uL Final   09/05/2024 121 (L) 150 - 450 10e3/uL Final   10/03/2012 241 140 - 440 thou/cu mm      Creatinine   Date Value Ref Range Status   09/08/2024 0.70 0.51 - 0.95 mg/dL Final   09/06/2024 0.65 0.51 - 0.95 mg/dL Final   09/05/2024 0.68 0.51 - 0.95 mg/dL Final     Potassium   Date Value Ref Range Status   09/08/2024 4.1 3.4 - 5.3 mmol/L Final   09/07/2024 4.2 3.4 - 5.3 mmol/L Final   09/06/2024 4.3 3.4 - 5.3 mmol/L Final   03/26/2021 3.7 3.5 - 5.0 mmol/L Final     Potassium POCT   Date Value Ref Range Status   09/05/2024 3.7 3.4 - 5.3 mmol/L Final   09/05/2024 3.2 (L) 3.4 - 5.3 mmol/L Final   09/05/2024 4.5 3.4 - 5.3 mmol/L Final     Magnesium   Date Value Ref Range Status   09/08/2024 2.1 1.7 - 2.3 mg/dL Final   09/07/2024 2.3 1.7 - 2.3 mg/dL Final   09/06/2024 2.2 1.7 - 2.3 mg/dL Final          I/O:  I/O last 3 completed shifts:  In: 1180 [P.O.:1180]  Out: 2960 [Urine:2650; Chest Tube:310]       Physical Exam:    General: Patient seen up in chair, NAD. Conversant, pleasant, calm, cooperative on exam.  HEENT: no sclera icterus, moist mucosa  CV: RRR on monitor. WWP, mild LE edema.  Incision C/D/I, no erythema or induration  Pulm: Unlabored breathing on supplemental oxygen via NC. Chest tubes in place, serosanguinous output, no air leak  Abd: SNTND  Ext: Mild pedal edema, warm  Neuro: A&O, CNs grossly intact, face symmetric, speech clear      ASSESSMENT/PLAN:    Maria Ines Molina is a 69 year old female with a history of bicuspid aortic valve and aortic insufficiency with ascending aortic aneurysm who is s/p bioprosthetic AVR and ascending aortic aneurysm repair.    Active Problems:    Aneurysm of ascending aorta without rupture (H24)        NEURO:  - Scheduled Tylenol/lidocaine patches and PRN Tylenol/oxycodone/dilaudid/Flexeril for pain    CV:  - Normotensive  - Metoprolol 25 mg two times a day with hold parameters  - ASA 81mg  - Hold PTA lisinopril  - Statin not indicated  -  TPW and mediastinal chest tube removed today, pericardial drain to remain per Dr. Morales  - Diuresis as below  - Will need post-op echo closer to discharge, once chest tubes have been removed  - Anticipate likely need for warfarin AC x3 months for bioprosthetic AVR, per surgeon preference with INR goal 2-3.  Defer initiation until all chest tubes removed.    PULM:  - Maintaining oxygen saturations on RA  - Encourage pulmonary toilet    GI  NUTRITION:  - Diet: Regular  - Bowel regimen; awaiting postop BM    RENAL  FE:  - Good UOP  - Newsome removed 9/7  - Diuresis with 20mg IV Lasix BID  - Continue electrolyte replacement protocol    HEME:  - Acute blood loss anemia  - Hgb stable, no bleeding concerns. Hep SQ, ASA    ID:  - Dorothy op ppx complete, afebrile; no concerns for infection    ENDO:  - Discontinue SSI as maintaining euglycemia without need for supplemental insulin    PPx:  - DVT: SCDs, SQ heparin TID, OOB/ambulation   - GI: Protonix 40mg PO daily    DISPO:  - General telemetry status  - Medically Ready for Discharge: Anticipated in 2-4 Days        Patient seen and discussed with Dr. Morales.      Maci Guzman, CNP, [id number]-AG  Cardiothoracic Surgery  [employer] Pager [phone]

## 2024-09-08 NOTE — PLAN OF CARE
Goal Outcome Evaluation:  Pt  is A0x4, endorses incisional pain, oxycodone & Tylenol given at different times to manage pain, effective. Chestube output volume 120ml. Pt walked 810 ft during shift. Chest incision site wiped w/ anti bacterial soap. Call light at bedside, up in chair w/ chair alarm on

## 2024-09-08 NOTE — PROGRESS NOTES
CVTS BRIEF PROGRESS NOTE    Ventricular TPW pulled without sustained ectopy or arrhythmia.    After a brief period of observation and no sudden increase in chest tube output or change in output character, mediastinal chest tube pulled without immediate complication.  Pericardial drain left in place to suction.  Occlusive dressing must remain in place for 24 hrs; reinforce prn.    Maci Guzman CNP, ACN-AG  Cardiothoracic Surgery  Pager [phone]

## 2024-09-09 ENCOUNTER — APPOINTMENT (OUTPATIENT)
Dept: OCCUPATIONAL THERAPY | Facility: HOSPITAL | Age: 69
DRG: 220 | End: 2024-09-09
Attending: SURGERY
Payer: MEDICARE

## 2024-09-09 LAB
CA-I BLD-MCNC: 4.4 MG/DL (ref 4.4–5.2)
CA-I BLD-MCNC: 4.6 MG/DL (ref 4.4–5.2)
INR PPP: 1.01 (ref 0.85–1.15)
MAGNESIUM SERPL-MCNC: 2 MG/DL (ref 1.7–2.3)
PATH REPORT.COMMENTS IMP SPEC: NORMAL
PATH REPORT.COMMENTS IMP SPEC: NORMAL
PATH REPORT.FINAL DX SPEC: NORMAL
PATH REPORT.GROSS SPEC: NORMAL
PATH REPORT.MICROSCOPIC SPEC OTHER STN: NORMAL
PATH REPORT.RELEVANT HX SPEC: NORMAL
PHOSPHATE SERPL-MCNC: 2.9 MG/DL (ref 2.5–4.5)
PHOTO IMAGE: NORMAL
PLATELET # BLD AUTO: 129 10E3/UL (ref 150–450)
POTASSIUM SERPL-SCNC: 3.4 MMOL/L (ref 3.4–5.3)
POTASSIUM SERPL-SCNC: 3.6 MMOL/L (ref 3.4–5.3)

## 2024-09-09 PROCEDURE — 250N000011 HC RX IP 250 OP 636: Mod: JZ | Performed by: SURGERY

## 2024-09-09 PROCEDURE — 85049 AUTOMATED PLATELET COUNT: CPT | Performed by: SURGERY

## 2024-09-09 PROCEDURE — 88311 DECALCIFY TISSUE: CPT | Mod: 26 | Performed by: PATHOLOGY

## 2024-09-09 PROCEDURE — 82330 ASSAY OF CALCIUM: CPT | Performed by: SURGERY

## 2024-09-09 PROCEDURE — 210N000001 HC R&B IMCU HEART CARE

## 2024-09-09 PROCEDURE — 84100 ASSAY OF PHOSPHORUS: CPT | Performed by: SURGERY

## 2024-09-09 PROCEDURE — 250N000013 HC RX MED GY IP 250 OP 250 PS 637: Performed by: SURGERY

## 2024-09-09 PROCEDURE — 36415 COLL VENOUS BLD VENIPUNCTURE: CPT

## 2024-09-09 PROCEDURE — 97535 SELF CARE MNGMENT TRAINING: CPT | Mod: GO

## 2024-09-09 PROCEDURE — 97110 THERAPEUTIC EXERCISES: CPT | Mod: GO

## 2024-09-09 PROCEDURE — 36415 COLL VENOUS BLD VENIPUNCTURE: CPT | Performed by: SURGERY

## 2024-09-09 PROCEDURE — 250N000011 HC RX IP 250 OP 636

## 2024-09-09 PROCEDURE — 88305 TISSUE EXAM BY PATHOLOGIST: CPT | Mod: 26 | Performed by: PATHOLOGY

## 2024-09-09 PROCEDURE — 250N000013 HC RX MED GY IP 250 OP 250 PS 637

## 2024-09-09 PROCEDURE — 85610 PROTHROMBIN TIME: CPT

## 2024-09-09 PROCEDURE — 84132 ASSAY OF SERUM POTASSIUM: CPT | Performed by: SURGERY

## 2024-09-09 PROCEDURE — 83735 ASSAY OF MAGNESIUM: CPT | Performed by: SURGERY

## 2024-09-09 RX ORDER — POTASSIUM CHLORIDE 1500 MG/1
40 TABLET, EXTENDED RELEASE ORAL ONCE
Status: COMPLETED | OUTPATIENT
Start: 2024-09-09 | End: 2024-09-09

## 2024-09-09 RX ORDER — WARFARIN SODIUM 5 MG/1
5 TABLET ORAL
Status: COMPLETED | OUTPATIENT
Start: 2024-09-09 | End: 2024-09-09

## 2024-09-09 RX ORDER — OXYCODONE HYDROCHLORIDE 5 MG/1
5 TABLET ORAL EVERY 4 HOURS PRN
Status: DISCONTINUED | OUTPATIENT
Start: 2024-09-09 | End: 2024-09-11 | Stop reason: HOSPADM

## 2024-09-09 RX ORDER — POTASSIUM CHLORIDE 1500 MG/1
20 TABLET, EXTENDED RELEASE ORAL ONCE
Status: COMPLETED | OUTPATIENT
Start: 2024-09-09 | End: 2024-09-09

## 2024-09-09 RX ORDER — MAGNESIUM OXIDE 400 MG/1
400 TABLET ORAL EVERY 4 HOURS
Status: COMPLETED | OUTPATIENT
Start: 2024-09-09 | End: 2024-09-09

## 2024-09-09 RX ORDER — FUROSEMIDE 10 MG/ML
20 INJECTION INTRAMUSCULAR; INTRAVENOUS
Status: DISCONTINUED | OUTPATIENT
Start: 2024-09-09 | End: 2024-09-10

## 2024-09-09 RX ADMIN — HEPARIN SODIUM 5000 UNITS: 10000 INJECTION, SOLUTION INTRAVENOUS; SUBCUTANEOUS at 05:58

## 2024-09-09 RX ADMIN — ACETAMINOPHEN 975 MG: 325 TABLET ORAL at 12:05

## 2024-09-09 RX ADMIN — CALCIUM GLUCONATE 1 G: 20 INJECTION, SOLUTION INTRAVENOUS at 12:41

## 2024-09-09 RX ADMIN — WARFARIN SODIUM 5 MG: 5 TABLET ORAL at 18:37

## 2024-09-09 RX ADMIN — METOPROLOL TARTRATE 25 MG: 25 TABLET, FILM COATED ORAL at 08:59

## 2024-09-09 RX ADMIN — MAGNESIUM OXIDE TAB 400 MG (241.3 MG ELEMENTAL MG) 400 MG: 400 (241.3 MG) TAB at 09:00

## 2024-09-09 RX ADMIN — LIDOCAINE 1 PATCH: 4 PATCH TOPICAL at 13:54

## 2024-09-09 RX ADMIN — SENNOSIDES AND DOCUSATE SODIUM 1 TABLET: 8.6; 5 TABLET ORAL at 08:59

## 2024-09-09 RX ADMIN — OXYCODONE HYDROCHLORIDE 5 MG: 5 TABLET ORAL at 00:01

## 2024-09-09 RX ADMIN — POLYETHYLENE GLYCOL 3350 17 G: 17 POWDER, FOR SOLUTION ORAL at 09:00

## 2024-09-09 RX ADMIN — METOPROLOL TARTRATE 37.5 MG: 25 TABLET, FILM COATED ORAL at 21:35

## 2024-09-09 RX ADMIN — SENNOSIDES AND DOCUSATE SODIUM 1 TABLET: 8.6; 5 TABLET ORAL at 21:35

## 2024-09-09 RX ADMIN — ASPIRIN 81 MG CHEWABLE TABLET 81 MG: 81 TABLET CHEWABLE at 08:59

## 2024-09-09 RX ADMIN — OXYCODONE HYDROCHLORIDE 5 MG: 5 TABLET ORAL at 18:24

## 2024-09-09 RX ADMIN — METOPROLOL TARTRATE 12.5 MG: 25 TABLET, FILM COATED ORAL at 11:53

## 2024-09-09 RX ADMIN — MAGNESIUM OXIDE TAB 400 MG (241.3 MG ELEMENTAL MG) 400 MG: 400 (241.3 MG) TAB at 05:58

## 2024-09-09 RX ADMIN — OXYCODONE HYDROCHLORIDE 5 MG: 5 TABLET ORAL at 12:05

## 2024-09-09 RX ADMIN — ACETAMINOPHEN 650 MG: 325 TABLET ORAL at 18:24

## 2024-09-09 RX ADMIN — ACETAMINOPHEN 975 MG: 325 TABLET ORAL at 04:28

## 2024-09-09 RX ADMIN — Medication 1 TABLET: at 08:59

## 2024-09-09 RX ADMIN — FUROSEMIDE 20 MG: 10 INJECTION, SOLUTION INTRAMUSCULAR; INTRAVENOUS at 11:52

## 2024-09-09 RX ADMIN — FUROSEMIDE 20 MG: 10 INJECTION, SOLUTION INTRAMUSCULAR; INTRAVENOUS at 18:24

## 2024-09-09 RX ADMIN — OXYCODONE HYDROCHLORIDE 5 MG: 5 TABLET ORAL at 23:54

## 2024-09-09 RX ADMIN — POTASSIUM CHLORIDE 40 MEQ: 1500 TABLET, EXTENDED RELEASE ORAL at 05:58

## 2024-09-09 RX ADMIN — POTASSIUM CHLORIDE 20 MEQ: 1500 TABLET, EXTENDED RELEASE ORAL at 12:41

## 2024-09-09 RX ADMIN — HEPARIN SODIUM 5000 UNITS: 10000 INJECTION, SOLUTION INTRAVENOUS; SUBCUTANEOUS at 13:54

## 2024-09-09 RX ADMIN — ACETAMINOPHEN 975 MG: 325 TABLET ORAL at 21:35

## 2024-09-09 RX ADMIN — PANTOPRAZOLE SODIUM 40 MG: 40 TABLET, DELAYED RELEASE ORAL at 08:59

## 2024-09-09 RX ADMIN — FUROSEMIDE 20 MG: 10 INJECTION, SOLUTION INTRAMUSCULAR; INTRAVENOUS at 08:59

## 2024-09-09 RX ADMIN — HEPARIN SODIUM 5000 UNITS: 10000 INJECTION, SOLUTION INTRAVENOUS; SUBCUTANEOUS at 21:35

## 2024-09-09 RX ADMIN — OXYCODONE HYDROCHLORIDE 5 MG: 5 TABLET ORAL at 05:58

## 2024-09-09 ASSESSMENT — ACTIVITIES OF DAILY LIVING (ADL)
ADLS_ACUITY_SCORE: 24
ADLS_ACUITY_SCORE: 27
ADLS_ACUITY_SCORE: 24
ADLS_ACUITY_SCORE: 27
ADLS_ACUITY_SCORE: 27
ADLS_ACUITY_SCORE: 24
ADLS_ACUITY_SCORE: 27
ADLS_ACUITY_SCORE: 24
ADLS_ACUITY_SCORE: 27
ADLS_ACUITY_SCORE: 24
ADLS_ACUITY_SCORE: 24
ADLS_ACUITY_SCORE: 27
ADLS_ACUITY_SCORE: 27
ADLS_ACUITY_SCORE: 25
ADLS_ACUITY_SCORE: 27

## 2024-09-09 NOTE — PHARMACY-ANTICOAGULATION SERVICE
Clinical Pharmacy - Warfarin Dosing Consult     Pharmacy has been consulted to manage this patient s warfarin therapy.  Indication: Bioprosthetic Heart Valve  Therapy Goal: INR 2-3  Provider/Team: Amanda/cardiovascular surgery  Warfarin Prior to Admission: No  Dose Comments: INR at baseline of 1.01 today, wt =96.2kg, will initiate with 5mg dose today    INR   Date Value Ref Range Status   09/09/2024 1.01 0.85 - 1.15 Final   09/05/2024 1.48 (H) 0.85 - 1.15 Final       Recommend warfarin 5 mg today.  Pharmacy will monitor Maria Ines Molina daily and order warfarin doses to achieve specified goal.      Please contact pharmacy as soon as possible if the warfarin needs to be held for a procedure or if the warfarin goals change.      Rubi Mckeon RPH on 9/9/2024 at 1:21 PM

## 2024-09-09 NOTE — PLAN OF CARE
Goal Outcome Evaluation:      Plan of Care Reviewed With: patient    Overall Patient Progress: improving Overall Patient Progress: improving       sc  Patient Name: Maria Ines Molina   MRN: [id number]   Date of Admission: 9/5/2024     Procedure: Procedure(s):  AORTIC VALVE REPLACEMENT AND ASCENDING AORTIC REPLACEMENT, LEFT ATRIAL APPENDAGE EXCISION, EPIAORTIC ULTRASOUND  ANESTHESIA TRANSESOPHAGEAL ECHOCARDIOGRAM     Post Op day #:4     Subjective (Patient focus/Primary Problem for shift): Encourage some independence          Pain Goal 0 Pain Rating 3-5           Pain Medication/ Regime effective to reduce patient pain PRN Oxycodone and scheduled Tylenol.      Objective (Physical assessment):           Rhythm: normal sinus rhythm             Bowel Activity: no if Yes indicate when:           Bowel Medications: yes             Incision: healing well          Incentive Spirometry Q 1-2 hour when awake:  yes Volume:           Epicardial Pacing Wires:  no             Patient Activity:           Up to chair for meals: yes          Ambulation with RN x2 (Not including CR): yes            Is patient in home clothes:no              Chest Tubes              Last chest tube removed today           Urinary Catheter: no            Preventative WOC consult (need MD order): no    Pt is doing very well with ambulating, asking multiple times to go for a walk on this shift. Pt is assist x 1 with a walker. VSS on RA and tele is NSR. Passing flatus with normoactive bowel sounds, but still no BM since surgery.

## 2024-09-09 NOTE — PLAN OF CARE
sc  Patient Name: Maria Ines Molina   MRN: [id number]   Date of Admission: 9/5/2024    Procedure: Procedure(s):  AORTIC VALVE REPLACEMENT AND ASCENDING AORTIC REPLACEMENT, LEFT ATRIAL APPENDAGE EXCISION, EPIAORTIC ULTRASOUND  ANESTHESIA TRANSESOPHAGEAL ECHOCARDIOGRAM    Post Op day #:3    Subjective (Patient focus/Primary Problem for shift): Increase activity          Pain Goal 0 Pain Rating 4           Pain Medication/ Regime effective to reduce patient painPRN Oxycodone and scheduled Tylenol.     Objective (Physical assessment):           Rhythm: normal sinus rhythm            Bowel Activity: no if Yes indicate when:           Bowel Medications: yes            Incision: healing well          Incentive Spirometry Q 1-2 hour when awake:  yes Volume:           Epicardial Pacing Wires:  yes            Patient Activity:           Up to chair for meals: yes          Ambulation with RN x2 (Not including CR): yes            Is patient in home clothes:no             Chest Tubes   Pleural: no Draining: not applicable               Suction: not applicable              Mediastinal: yes Draining: yes               Suction: yes   Dressing Change Daily:yes If No, why?                     Urinary Catheter: no           Preventative WOC consult (need MD order): no       Assessment (Nursing primary shift focus): Assumed care 1900 to 2330. A&O x 4. Assist x 1 with a walker. Tele is NSR. C/O incisional chest pain, scheduled Tylenol given (see MAR). Up to toilet. Passing flatus. Call light within reach, able to make needs known. Bed alarm on for safety.      Plan (Patient Care Plan/focus): Bowel medication and increase activity.      Aster Stewart RN   9/9/2024   12:06 AM

## 2024-09-09 NOTE — PLAN OF CARE
Problem: Adult Inpatient Plan of Care  Goal: Optimal Comfort and Wellbeing  Outcome: Progressing  Intervention: Monitor Pain and Promote Comfort  Recent Flowsheet Documentation  Taken 9/9/2024 0423 by Tatyana Morrison RN  Pain Management Interventions: medication (see MAR)  Taken 9/9/2024 0001 by Tatyana Morrison RN  Pain Management Interventions: medication (see MAR)  Intervention: Provide Person-Centered Care  Recent Flowsheet Documentation  Taken 9/9/2024 0423 by Tatyana Morrison RN  Trust Relationship/Rapport:   questions answered   questions encouraged   care explained  Taken 9/9/2024 0001 by Tatyana Morrison RN  Trust Relationship/Rapport:   questions answered   questions encouraged   care explained   Goal Outcome Evaluation:    Pt restful, comfortable over noc. Pain is well controlled. Up walking the halls x2 over noc with standby assist. Chest tube with 50ml serosanguinous output. Electrolyte replacement per md order. NSR on tele. Vitals stable. O2 sats stable on room air. Passing flatus, no bm. Falls precautions in place.

## 2024-09-09 NOTE — PROGRESS NOTES
CVTS Daily Progress Note   POD4 s/p bioprosthetic AVR, ascending aortic aneurysm repair, and BRITTNEY excision  Attending: Andrew  LOS: 4    SUBJECTIVE/INTERVAL EVENTS:    NAEO, patient progressing well. Maintaining oxygen saturations on RA. Normotensive. OOB, pain well controlled. -BM / +flatus. Tolerating PO intake. UOP adequate. Chest tube output appropriate. Patient denies new chest pain, shortness of breath, abdominal pain, and nausea. Asks when she might be able to discharge home and inquires about afib mitigation.    OBJECTIVE:  Temp:  [98.1  F (36.7  C)-98.9  F (37.2  C)] 98.9  F (37.2  C)  Pulse:  [73-95] 78  Resp:  [18-20] 18  BP: (109-153)/(55-86) 109/55  SpO2:  [93 %-96 %] 93 %  Vitals:    09/05/24 0523 09/06/24 0615 09/07/24 0608 09/08/24 0458   Weight: 93 kg (205 lb) 99 kg (218 lb 4.8 oz) 99.5 kg (219 lb 6.4 oz) 97.7 kg (215 lb 4.8 oz)    09/09/24 0457   Weight: 97.5 kg (215 lb)       Clinically Significant Risk Factors              # Hypoalbuminemia: Lowest albumin = 3.4 g/dL at 9/6/2024  4:16 AM, will monitor as appropriate     # Thrombocytopenia: Lowest platelets = 129 in last 2 days, will monitor for bleeding                       Current Medications:    Scheduled Meds:  Current Facility-Administered Medications   Medication Dose Route Frequency Provider Last Rate Last Admin    acetaminophen (TYLENOL) tablet 975 mg  975 mg Oral Q8H Maci Guzman NP   975 mg at 09/09/24 0428    aspirin (ASA) chewable tablet 81 mg  81 mg Oral or NG Tube Daily Maci Guzman NP   81 mg at 09/08/24 0847    furosemide (LASIX) injection 20 mg  20 mg Intravenous TID Ivon Patel PA-C        heparin ANTICOAGULANT injection 5,000 Units  5,000 Units Subcutaneous Q8H Maci Guzman NP   5,000 Units at 09/09/24 0558    Lidocaine (LIDOCARE) 4 % Patch 1-2 patch  1-2 patch Transdermal Q24H Maci Guzman, NP   1 patch at 09/08/24 1534    magnesium oxide (MAG-OX) tablet 400 mg  400 mg Oral Q4H Andrew  Alfredo Dial MD   400 mg at 09/09/24 0558    metoprolol tartrate (LOPRESSOR) tablet 25 mg  25 mg Oral BID Maci Guzman NP   25 mg at 09/08/24 2142    multivitamin w/minerals (THERA-VIT-M) tablet 1 tablet  1 tablet Oral Daily Maci Guzman NP   1 tablet at 09/08/24 0846    pantoprazole (PROTONIX) EC tablet 40 mg  40 mg Oral Daily Maci Guzman NP   40 mg at 09/08/24 0846    polyethylene glycol (MIRALAX) Packet 17 g  17 g Oral Daily Maci Guzman NP   17 g at 09/08/24 0846    senna-docusate (SENOKOT-S/PERICOLACE) 8.6-50 MG per tablet 1 tablet  1 tablet Oral BID Maci Guzman NP   1 tablet at 09/08/24 2142     Continuous Infusions:  Current Facility-Administered Medications   Medication Dose Route Frequency Provider Last Rate Last Admin     PRN Meds:  Current Facility-Administered Medications   Medication Dose Route Frequency Provider Last Rate Last Admin    acetaminophen (TYLENOL) tablet 650 mg  650 mg Oral Q4H PRN Maci Guzman NP        bisacodyl (DULCOLAX) suppository 10 mg  10 mg Rectal Daily PRN Maci Guzman NP        calcium gluconate 1 g in 50 mL in sodium chloride intermittent infusion  1 g Intravenous Once PRN Maci Guzman NP   1 g at 09/05/24 1358    calcium gluconate 2 g in  mL intermittent infusion  2 g Intravenous Once PRN Maci Guzman NP        cyclobenzaprine (FLEXERIL) tablet 10 mg  10 mg Oral TID PRN Maci Guzman NP        glucose gel 15-30 g  15-30 g Oral Q15 Min PRN Maci Guzman NP        Or    dextrose 50 % injection 25-50 mL  25-50 mL Intravenous Q15 Min PRMaci Mayer NP        Or    glucagon injection 1 mg  1 mg Subcutaneous Q15 Min PRN Maci Guzman NP        hydrALAZINE (APRESOLINE) injection 10 mg  10 mg Intravenous Q4H PRN Thomas, Jennilyn M, NP        magnesium hydroxide (MILK OF MAGNESIA) suspension 30 mL  30 mL Oral Daily PRN Maci Guzman, NP        naloxone (NARCAN) injection 0.2 mg  0.2 mg Intravenous Q2 Min PRN  Maci Guzman NP        Or    naloxone (NARCAN) injection 0.4 mg  0.4 mg Intravenous Q2 Min PRN Maci Guzman NP        Or    naloxone (NARCAN) injection 0.2 mg  0.2 mg Intramuscular Q2 Min PRN Maci Guzman NP        Or    naloxone (NARCAN) injection 0.4 mg  0.4 mg Intramuscular Q2 Min PRN Maci Guzman NP        ondansetron (ZOFRAN ODT) ODT tab 4 mg  4 mg Oral Q6H PRN Maci Guzman NP        Or    ondansetron (ZOFRAN) injection 4 mg  4 mg Intravenous Q6H PRN Maci Guzman NP   4 mg at 09/05/24 1842    oxyCODONE IR (ROXICODONE) half-tab 2.5 mg  2.5 mg Oral Q4H PRN Ivon Patel PA-C        Or    oxyCODONE (ROXICODONE) tablet 5 mg  5 mg Oral Q4H PRN Ivon Patel PA-C           Cardiographics:    Telemetry monitoring demonstrates SR with rates in the 70s per my personal review.    Imaging:  No results found for this or any previous visit (from the past 24 hour(s)).      Labs, personally reviewed.  Hemoglobin   Date Value Ref Range Status   09/06/2024 9.6 (L) 11.7 - 15.7 g/dL Final   09/05/2024 10.7 (L) 11.7 - 15.7 g/dL Final   09/05/2024 8.6 (L) 11.7 - 15.7 g/dL Final   10/03/2012 14.1 12.0 - 16.0 g/dL      Hemoglobin POCT   Date Value Ref Range Status   09/05/2024 9.5 (L) 11.7 - 15.7 g/dL Final   09/05/2024 8.6 (L) 11.7 - 15.7 g/dL Final   09/05/2024 10.7 (L) 11.7 - 15.7 g/dL Final     WBC Count   Date Value Ref Range Status   09/06/2024 8.7 4.0 - 11.0 10e3/uL Final   09/05/2024 14.3 (H) 4.0 - 11.0 10e3/uL Final   09/05/2024 15.6 (H) 4.0 - 11.0 10e3/uL Final     Platelet Count   Date Value Ref Range Status   09/09/2024 129 (L) 150 - 450 10e3/uL Final   09/06/2024 92 (L) 150 - 450 10e3/uL Final   09/05/2024 107 (L) 150 - 450 10e3/uL Final   10/03/2012 241 140 - 440 thou/cu mm      Creatinine   Date Value Ref Range Status   09/08/2024 0.70 0.51 - 0.95 mg/dL Final   09/06/2024 0.65 0.51 - 0.95 mg/dL Final   09/05/2024 0.68 0.51 - 0.95 mg/dL Final     Potassium   Date Value Ref  Range Status   09/09/2024 3.4 3.4 - 5.3 mmol/L Final   09/08/2024 4.1 3.4 - 5.3 mmol/L Final   09/07/2024 4.2 3.4 - 5.3 mmol/L Final   03/26/2021 3.7 3.5 - 5.0 mmol/L Final     Potassium POCT   Date Value Ref Range Status   09/05/2024 3.7 3.4 - 5.3 mmol/L Final   09/05/2024 3.2 (L) 3.4 - 5.3 mmol/L Final   09/05/2024 4.5 3.4 - 5.3 mmol/L Final     Magnesium   Date Value Ref Range Status   09/09/2024 2.0 1.7 - 2.3 mg/dL Final   09/08/2024 2.1 1.7 - 2.3 mg/dL Final   09/07/2024 2.3 1.7 - 2.3 mg/dL Final          I/O:  I/O last 3 completed shifts:  In: 1088 [P.O.:1020; I.V.:68]  Out: 3555 [Urine:3425; Chest Tube:130]       Physical Exam:    General: Patient seen up in chair, NAD. Conversant, pleasant, calm, cooperative on exam.  HEENT: no sclera icterus, moist mucosa  CV: RRR on monitor. mild LE edema. Incision C/D/I, no erythema or induration  Pulm: Unlabored breathing on room air. Chest tubes in place, serous output, no air leak  Abd: SNTND  Ext: Mild pedal edema, warm  Neuro: A&O, CNs grossly intact, face symmetric, speech clear      ASSESSMENT/PLAN:    Maria Ines Molina is a 69 year old female with a history of bicuspid aortic valve and aortic insufficiency with ascending aortic aneurysm who is s/p bioprosthetic AVR and ascending aortic aneurysm repair.    Active Problems:    Aneurysm of ascending aorta without rupture (H24)        NEURO:  - Scheduled Tylenol/lidocaine patches and PRN Tylenol/oxycodone/Flexeril for pain    CV:  - Normotensive  - Metoprolol 25 mg BID with hold parameters  - ASA 81mg  - Binh CT and TPW removed POD#3, pericardial drain removed POD#4 w/o immediate complications  - Hold PTA lisinopril  - Statin not indicated  - Diuresis as below  - Will need post-op echo closer to discharge and closer to preop weight  - Anticoagulation x3 months postop for bioAVR. Ok for DOAC per surgeon. Will check insurance coverage and start either DOAC or warfarin pending coverage. If warfarin, INR goal 2-3.      PULM:  - Maintaining oxygen saturations on RA  - Encourage pulmonary toilet    GI  NUTRITION:  - Diet: Regular  - Bowel regimen; LBM preop    RENAL  FE:  - Good UOP, net -2.4 L yesterday although still feeling edematous  - Cr stable at baseline.  - Diuresis with 20mg IV Lasix TID  - Continue electrolyte replacement protocol    HEME:  - Acute blood loss anemia  - Hgb stable, no bleeding concerns. Hep SQ, ASA    ID:  - Dorothy op ppx complete, afebrile; no concerns for infection    ENDO:  - HbA1c 5.5%, euglycemic    PPx:  - DVT: SCDs, SQ heparin TID, OOB/ambulation   - GI: Protonix 40mg PO daily    DISPO:  - General telemetry status (Transferred POD#2)  - Medically Ready for Discharge: Anticipated Tomorrow or 9/11 pending diuresis and BM        Patient seen w/ Dr. Brambila and discussed with Dr. Morales.      Carissa Patel PA-C  [facility] Cardiothoracic Surgery  Pager: [phone]  September 9, 2024

## 2024-09-09 NOTE — CONSULTS
9/9 test claim for DOAC's- Eliquis $486 for 30 days supply and Xarelto $482 for 30 days supply, patient meeting a deductible and once met the cost will be $47. First 30 day vouchers avail in the discharge pharmacy if needed. Thank you for allowing me to help with your patient  Radha Pagan [facility]  Pharmacy Discharge Liaison [facility]

## 2024-09-09 NOTE — PROGRESS NOTES
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

## 2024-09-10 ENCOUNTER — APPOINTMENT (OUTPATIENT)
Dept: OCCUPATIONAL THERAPY | Facility: HOSPITAL | Age: 69
DRG: 220 | End: 2024-09-10
Attending: SURGERY
Payer: MEDICARE

## 2024-09-10 ENCOUNTER — APPOINTMENT (OUTPATIENT)
Dept: CARDIOLOGY | Facility: HOSPITAL | Age: 69
DRG: 220 | End: 2024-09-10
Payer: MEDICARE

## 2024-09-10 ENCOUNTER — APPOINTMENT (OUTPATIENT)
Dept: RADIOLOGY | Facility: HOSPITAL | Age: 69
DRG: 220 | End: 2024-09-10
Payer: MEDICARE

## 2024-09-10 PROBLEM — I71.21 ANEURYSM OF ASCENDING AORTA WITHOUT RUPTURE (H): Status: RESOLVED | Noted: 2024-03-01 | Resolved: 2024-09-10

## 2024-09-10 PROBLEM — I35.9 AORTIC VALVE DISEASE: Status: RESOLVED | Noted: 2024-03-01 | Resolved: 2024-09-10

## 2024-09-10 LAB
CA-I BLD-MCNC: 4.7 MG/DL (ref 4.4–5.2)
INR PPP: 1.02 (ref 0.85–1.15)
LVEF ECHO: NORMAL
MAGNESIUM SERPL-MCNC: 2 MG/DL (ref 1.7–2.3)
PHOSPHATE SERPL-MCNC: 3.4 MG/DL (ref 2.5–4.5)
POTASSIUM SERPL-SCNC: 3.5 MMOL/L (ref 3.4–5.3)

## 2024-09-10 PROCEDURE — 250N000013 HC RX MED GY IP 250 OP 250 PS 637: Performed by: SURGERY

## 2024-09-10 PROCEDURE — 97535 SELF CARE MNGMENT TRAINING: CPT | Mod: GO

## 2024-09-10 PROCEDURE — 250N000013 HC RX MED GY IP 250 OP 250 PS 637

## 2024-09-10 PROCEDURE — 36415 COLL VENOUS BLD VENIPUNCTURE: CPT | Performed by: SURGERY

## 2024-09-10 PROCEDURE — 83735 ASSAY OF MAGNESIUM: CPT | Performed by: SURGERY

## 2024-09-10 PROCEDURE — 84132 ASSAY OF SERUM POTASSIUM: CPT | Performed by: SURGERY

## 2024-09-10 PROCEDURE — 999N000208 ECHOCARDIOGRAM LIMITED

## 2024-09-10 PROCEDURE — 255N000002 HC RX 255 OP 636

## 2024-09-10 PROCEDURE — 250N000011 HC RX IP 250 OP 636: Performed by: SURGERY

## 2024-09-10 PROCEDURE — 93325 DOPPLER ECHO COLOR FLOW MAPG: CPT | Mod: 26 | Performed by: INTERNAL MEDICINE

## 2024-09-10 PROCEDURE — 250N000011 HC RX IP 250 OP 636

## 2024-09-10 PROCEDURE — 97110 THERAPEUTIC EXERCISES: CPT | Mod: GO

## 2024-09-10 PROCEDURE — 36415 COLL VENOUS BLD VENIPUNCTURE: CPT

## 2024-09-10 PROCEDURE — 210N000001 HC R&B IMCU HEART CARE

## 2024-09-10 PROCEDURE — 93308 TTE F-UP OR LMTD: CPT | Mod: 26 | Performed by: INTERNAL MEDICINE

## 2024-09-10 PROCEDURE — 82330 ASSAY OF CALCIUM: CPT | Performed by: SURGERY

## 2024-09-10 PROCEDURE — 93321 DOPPLER ECHO F-UP/LMTD STD: CPT | Mod: 26 | Performed by: INTERNAL MEDICINE

## 2024-09-10 PROCEDURE — 84100 ASSAY OF PHOSPHORUS: CPT | Performed by: SURGERY

## 2024-09-10 PROCEDURE — 85610 PROTHROMBIN TIME: CPT

## 2024-09-10 PROCEDURE — 71046 X-RAY EXAM CHEST 2 VIEWS: CPT

## 2024-09-10 RX ORDER — POTASSIUM CHLORIDE 1500 MG/1
20 TABLET, EXTENDED RELEASE ORAL ONCE
Status: COMPLETED | OUTPATIENT
Start: 2024-09-10 | End: 2024-09-10

## 2024-09-10 RX ORDER — METOPROLOL TARTRATE 25 MG/1
50 TABLET, FILM COATED ORAL 2 TIMES DAILY
Status: DISCONTINUED | OUTPATIENT
Start: 2024-09-10 | End: 2024-09-11 | Stop reason: HOSPADM

## 2024-09-10 RX ORDER — ACETAZOLAMIDE 250 MG/1
250 TABLET ORAL ONCE
Status: COMPLETED | OUTPATIENT
Start: 2024-09-10 | End: 2024-09-10

## 2024-09-10 RX ORDER — MAGNESIUM OXIDE 400 MG/1
400 TABLET ORAL EVERY 4 HOURS
Status: COMPLETED | OUTPATIENT
Start: 2024-09-10 | End: 2024-09-10

## 2024-09-10 RX ORDER — BISACODYL 10 MG
10 SUPPOSITORY, RECTAL RECTAL
Status: COMPLETED | OUTPATIENT
Start: 2024-09-10 | End: 2024-09-10

## 2024-09-10 RX ORDER — FUROSEMIDE 10 MG/ML
20 INJECTION INTRAMUSCULAR; INTRAVENOUS
Status: DISCONTINUED | OUTPATIENT
Start: 2024-09-10 | End: 2024-09-10

## 2024-09-10 RX ORDER — FUROSEMIDE 10 MG/ML
20 INJECTION INTRAMUSCULAR; INTRAVENOUS
Status: DISCONTINUED | OUTPATIENT
Start: 2024-09-10 | End: 2024-09-11

## 2024-09-10 RX ORDER — WARFARIN SODIUM 5 MG/1
5 TABLET ORAL
Status: DISCONTINUED | OUTPATIENT
Start: 2024-09-10 | End: 2024-09-10

## 2024-09-10 RX ADMIN — ACETAZOLAMIDE 250 MG: 250 TABLET ORAL at 12:10

## 2024-09-10 RX ADMIN — HEPARIN SODIUM 5000 UNITS: 10000 INJECTION, SOLUTION INTRAVENOUS; SUBCUTANEOUS at 06:07

## 2024-09-10 RX ADMIN — OXYCODONE HYDROCHLORIDE 2.5 MG: 5 TABLET ORAL at 23:45

## 2024-09-10 RX ADMIN — POTASSIUM CHLORIDE 20 MEQ: 1500 TABLET, EXTENDED RELEASE ORAL at 12:11

## 2024-09-10 RX ADMIN — METOPROLOL TARTRATE 37.5 MG: 25 TABLET, FILM COATED ORAL at 09:03

## 2024-09-10 RX ADMIN — SENNOSIDES AND DOCUSATE SODIUM 1 TABLET: 8.6; 5 TABLET ORAL at 09:03

## 2024-09-10 RX ADMIN — PANTOPRAZOLE SODIUM 40 MG: 40 TABLET, DELAYED RELEASE ORAL at 09:03

## 2024-09-10 RX ADMIN — APIXABAN 5 MG: 5 TABLET, FILM COATED ORAL at 12:10

## 2024-09-10 RX ADMIN — Medication 1 TABLET: at 09:02

## 2024-09-10 RX ADMIN — OXYCODONE HYDROCHLORIDE 2.5 MG: 5 TABLET ORAL at 18:03

## 2024-09-10 RX ADMIN — METOPROLOL TARTRATE 50 MG: 25 TABLET, FILM COATED ORAL at 21:20

## 2024-09-10 RX ADMIN — ACETAMINOPHEN 975 MG: 325 TABLET ORAL at 21:20

## 2024-09-10 RX ADMIN — PERFLUTREN 2 ML: 6.52 INJECTION, SUSPENSION INTRAVENOUS at 11:50

## 2024-09-10 RX ADMIN — FUROSEMIDE 20 MG: 10 INJECTION, SOLUTION INTRAMUSCULAR; INTRAVENOUS at 09:02

## 2024-09-10 RX ADMIN — FUROSEMIDE 20 MG: 10 INJECTION, SOLUTION INTRAMUSCULAR; INTRAVENOUS at 12:10

## 2024-09-10 RX ADMIN — ASPIRIN 81 MG CHEWABLE TABLET 81 MG: 81 TABLET CHEWABLE at 09:04

## 2024-09-10 RX ADMIN — LIDOCAINE 2 PATCH: 4 PATCH TOPICAL at 14:16

## 2024-09-10 RX ADMIN — POLYETHYLENE GLYCOL 3350 17 G: 17 POWDER, FOR SOLUTION ORAL at 09:04

## 2024-09-10 RX ADMIN — MAGNESIUM OXIDE TAB 400 MG (241.3 MG ELEMENTAL MG) 400 MG: 400 (241.3 MG) TAB at 12:10

## 2024-09-10 RX ADMIN — MAGNESIUM HYDROXIDE 30 ML: 400 SUSPENSION ORAL at 12:10

## 2024-09-10 RX ADMIN — OXYCODONE HYDROCHLORIDE 2.5 MG: 5 TABLET ORAL at 12:15

## 2024-09-10 RX ADMIN — METOPROLOL TARTRATE 12.5 MG: 25 TABLET, FILM COATED ORAL at 12:10

## 2024-09-10 RX ADMIN — APIXABAN 5 MG: 5 TABLET, FILM COATED ORAL at 21:21

## 2024-09-10 RX ADMIN — OXYCODONE HYDROCHLORIDE 5 MG: 5 TABLET ORAL at 06:06

## 2024-09-10 RX ADMIN — ACETAMINOPHEN 975 MG: 325 TABLET ORAL at 14:16

## 2024-09-10 RX ADMIN — POTASSIUM CHLORIDE 20 MEQ: 1500 TABLET, EXTENDED RELEASE ORAL at 09:03

## 2024-09-10 RX ADMIN — MAGNESIUM OXIDE TAB 400 MG (241.3 MG ELEMENTAL MG) 400 MG: 400 (241.3 MG) TAB at 09:03

## 2024-09-10 RX ADMIN — ACETAMINOPHEN 975 MG: 325 TABLET ORAL at 06:06

## 2024-09-10 RX ADMIN — SENNOSIDES AND DOCUSATE SODIUM 1 TABLET: 8.6; 5 TABLET ORAL at 21:21

## 2024-09-10 RX ADMIN — FUROSEMIDE 20 MG: 10 INJECTION, SOLUTION INTRAMUSCULAR; INTRAVENOUS at 16:26

## 2024-09-10 ASSESSMENT — ACTIVITIES OF DAILY LIVING (ADL)
ADLS_ACUITY_SCORE: 27
ADLS_ACUITY_SCORE: 27
ADLS_ACUITY_SCORE: 26
ADLS_ACUITY_SCORE: 27
ADLS_ACUITY_SCORE: 26
ADLS_ACUITY_SCORE: 27
ADLS_ACUITY_SCORE: 26
ADLS_ACUITY_SCORE: 27
ADLS_ACUITY_SCORE: 26
ADLS_ACUITY_SCORE: 27
ADLS_ACUITY_SCORE: 26
ADLS_ACUITY_SCORE: 27
ADLS_ACUITY_SCORE: 26
ADLS_ACUITY_SCORE: 27
ADLS_ACUITY_SCORE: 26

## 2024-09-10 NOTE — PLAN OF CARE
Problem: Cardiovascular Surgery  Goal: Effective Bowel Elimination  Outcome: Not Progressing     Problem: Cardiovascular Surgery  Goal: Improved Activity Tolerance  Outcome: Progressing  Intervention: Optimize Tolerance for Activity  Recent Flowsheet Documentation  Taken 9/10/2024 0800 by Taylor Mujica RN  Environmental Support: calm environment promoted     Problem: Cardiovascular Surgery  Goal: Effective Cardiac Function  Outcome: Progressing   Goal Outcome Evaluation:       VSS. Monitor shows NSR. C/o incisional chest discomfort 4/10. Medicated x1 with Oxycodone 2.5mg PO with some relief of discomfort. On Scheduled Tylenol PO and Lido patches. Showered this am. Incision care done. No BM yet. Given MOM in addition to scheduled senna and miralax. Ambulated in room, up in chair x2. Appetite good. Continued on IV Lasix.

## 2024-09-10 NOTE — PLAN OF CARE
Problem: Risk for Delirium  Goal: Improved Sleep  Outcome: Progressing     Problem: Cardiovascular Surgery  Goal: Improved Activity Tolerance  Outcome: Progressing  Intervention: Optimize Tolerance for Activity  Recent Flowsheet Documentation  Taken 9/10/2024 0430 by Tatyana Morrison, RN  Environmental Support: calm environment promoted  Taken 9/9/2024 3191 by Tatyana Morrison, RN  Environmental Support: calm environment promoted   Goal Outcome Evaluation:    Pt had restful noc. Pain well controlled. Up walking the halls frequently. NSR on tele. Passing flatus, no BM. Plan for shower today. Electrolyte replacement per protocol.

## 2024-09-10 NOTE — PROGRESS NOTES
CVTS Daily Progress Note   POD5 s/p bioprosthetic AVR, ascending aortic aneurysm repair, and BRITTNEY excision  Attending: Andrew  LOS: 5    SUBJECTIVE/INTERVAL EVENTS:    NAEO, patient progressing well. Maintaining oxygen saturations on RA. Normotensive. OOB, pain well controlled. -BM / +flatus. Tolerating PO intake. UOP adequate. Patient denies new chest pain, shortness of breath, abdominal pain, and nausea. Discussed anticoagulation s/p bioAVR as Dr. Morales ok w/ either warfarin or DOAC but DOAC w/ potentially high OOP costs. Per patient, she would prefer to pay OOP for DOAC if necessary over 3 months of warfarin.    OBJECTIVE:  Temp:  [97.9  F (36.6  C)-98.9  F (37.2  C)] 98.2  F (36.8  C)  Pulse:  [72-87] 85  Resp:  [18-20] 18  BP: (103-131)/(60-79) 111/68  SpO2:  [92 %-95 %] 94 %  Vitals:    09/07/24 0608 09/08/24 0458 09/09/24 0457 09/09/24 1018   Weight: 99.5 kg (219 lb 6.4 oz) 97.7 kg (215 lb 4.8 oz) 97.5 kg (215 lb) 96.2 kg (212 lb)    09/10/24 0347   Weight: 94.5 kg (208 lb 6.4 oz)       Clinically Significant Risk Factors              # Hypoalbuminemia: Lowest albumin = 3.4 g/dL at 9/6/2024  4:16 AM, will monitor as appropriate     # Thrombocytopenia: Lowest platelets = 129 in last 2 days, will monitor for bleeding                       Current Medications:    Scheduled Meds:  Current Facility-Administered Medications   Medication Dose Route Frequency Provider Last Rate Last Admin    acetaminophen (TYLENOL) tablet 975 mg  975 mg Oral Q8H Maci Guzman NP   975 mg at 09/10/24 0606    apixaban ANTICOAGULANT (ELIQUIS) tablet 5 mg  5 mg Oral BID Ivon Patel PA-C        aspirin (ASA) chewable tablet 81 mg  81 mg Oral or NG Tube Daily Maci Guzman NP   81 mg at 09/10/24 0904    furosemide (LASIX) injection 20 mg  20 mg Intravenous BID Ivon Patel, NOHEMI   20 mg at 09/10/24 0902    Lidocaine (LIDOCARE) 4 % Patch 1-2 patch  1-2 patch Transdermal Q24H Maci Guzman, NP   1 patch  at 09/09/24 1354    magnesium oxide (MAG-OX) tablet 400 mg  400 mg Oral Q4H Alfredo Morales MD   400 mg at 09/10/24 0903    metoprolol tartrate (LOPRESSOR) half-tab 12.5 mg  12.5 mg Oral Once Ivon Patel PA-C        metoprolol tartrate (LOPRESSOR) tablet 50 mg  50 mg Oral BID Ivon Patel PA-C        multivitamin w/minerals (THERA-VIT-M) tablet 1 tablet  1 tablet Oral Daily Maci Guzman NP   1 tablet at 09/10/24 0902    pantoprazole (PROTONIX) EC tablet 40 mg  40 mg Oral Daily Maci Guzman NP   40 mg at 09/10/24 0903    polyethylene glycol (MIRALAX) Packet 17 g  17 g Oral Daily Maci Guzman NP   17 g at 09/10/24 0904    senna-docusate (SENOKOT-S/PERICOLACE) 8.6-50 MG per tablet 1 tablet  1 tablet Oral BID Maci Guzman NP   1 tablet at 09/10/24 0903     Continuous Infusions:  Current Facility-Administered Medications   Medication Dose Route Frequency Provider Last Rate Last Admin     PRN Meds:  Current Facility-Administered Medications   Medication Dose Route Frequency Provider Last Rate Last Admin    acetaminophen (TYLENOL) tablet 650 mg  650 mg Oral Q4H PRN Maci Guzman NP   650 mg at 09/09/24 1824    bisacodyl (DULCOLAX) suppository 10 mg  10 mg Rectal Daily PRN Maci Guzman NP        calcium gluconate 1 g in 50 mL in sodium chloride intermittent infusion  1 g Intravenous Once PRN Maci Guzman NP   1 g at 09/09/24 1241    calcium gluconate 2 g in  mL intermittent infusion  2 g Intravenous Once PRN Maci Guzman NP        cyclobenzaprine (FLEXERIL) tablet 10 mg  10 mg Oral TID PRN Maci Guzman NP        glucose gel 15-30 g  15-30 g Oral Q15 Min PRN Maci Guzman NP        Or    dextrose 50 % injection 25-50 mL  25-50 mL Intravenous Q15 Min PRN Thomas, Jennilyn M, NP        Or    glucagon injection 1 mg  1 mg Subcutaneous Q15 Min Maci Lott NP        hydrALAZINE (APRESOLINE) injection 10 mg  10 mg Intravenous Q4H PRJULIANO Guzman,  Maci BLOOD NP        magnesium hydroxide (MILK OF MAGNESIA) suspension 30 mL  30 mL Oral Daily PRN Maci Guzman NP        naloxone (NARCAN) injection 0.2 mg  0.2 mg Intravenous Q2 Min PRN Maci Guzman NP        Or    naloxone (NARCAN) injection 0.4 mg  0.4 mg Intravenous Q2 Min PRN Maci Guzman NP        Or    naloxone (NARCAN) injection 0.2 mg  0.2 mg Intramuscular Q2 Min PRN Maci Guzman NP        Or    naloxone (NARCAN) injection 0.4 mg  0.4 mg Intramuscular Q2 Min PRN Maci Guzman NP        ondansetron (ZOFRAN ODT) ODT tab 4 mg  4 mg Oral Q6H PRN Maci Guzman NP        Or    ondansetron (ZOFRAN) injection 4 mg  4 mg Intravenous Q6H PRN Maci Guzman NP   4 mg at 09/05/24 1842    oxyCODONE IR (ROXICODONE) half-tab 2.5 mg  2.5 mg Oral Q4H PRN Ivon Patel PA-C        Or    oxyCODONE (ROXICODONE) tablet 5 mg  5 mg Oral Q4H PRN Ivon Patel PA-C   5 mg at 09/10/24 0606       Cardiographics:    Telemetry monitoring demonstrates SR with rates in the 70s per my personal review.    Imaging:  No results found for this or any previous visit (from the past 24 hour(s)).      Labs, personally reviewed.  Hemoglobin   Date Value Ref Range Status   09/06/2024 9.6 (L) 11.7 - 15.7 g/dL Final   09/05/2024 10.7 (L) 11.7 - 15.7 g/dL Final   09/05/2024 8.6 (L) 11.7 - 15.7 g/dL Final   10/03/2012 14.1 12.0 - 16.0 g/dL      Hemoglobin POCT   Date Value Ref Range Status   09/05/2024 9.5 (L) 11.7 - 15.7 g/dL Final   09/05/2024 8.6 (L) 11.7 - 15.7 g/dL Final   09/05/2024 10.7 (L) 11.7 - 15.7 g/dL Final     WBC Count   Date Value Ref Range Status   09/06/2024 8.7 4.0 - 11.0 10e3/uL Final   09/05/2024 14.3 (H) 4.0 - 11.0 10e3/uL Final   09/05/2024 15.6 (H) 4.0 - 11.0 10e3/uL Final     Platelet Count   Date Value Ref Range Status   09/09/2024 129 (L) 150 - 450 10e3/uL Final   09/06/2024 92 (L) 150 - 450 10e3/uL Final   09/05/2024 107 (L) 150 - 450 10e3/uL Final   10/03/2012 241 140 - 440  thou/cu mm      Creatinine   Date Value Ref Range Status   09/08/2024 0.70 0.51 - 0.95 mg/dL Final   09/06/2024 0.65 0.51 - 0.95 mg/dL Final   09/05/2024 0.68 0.51 - 0.95 mg/dL Final     Potassium   Date Value Ref Range Status   09/10/2024 3.5 3.4 - 5.3 mmol/L Final   09/09/2024 3.6 3.4 - 5.3 mmol/L Final   09/09/2024 3.4 3.4 - 5.3 mmol/L Final   03/26/2021 3.7 3.5 - 5.0 mmol/L Final     Potassium POCT   Date Value Ref Range Status   09/05/2024 3.7 3.4 - 5.3 mmol/L Final   09/05/2024 3.2 (L) 3.4 - 5.3 mmol/L Final   09/05/2024 4.5 3.4 - 5.3 mmol/L Final     Magnesium   Date Value Ref Range Status   09/10/2024 2.0 1.7 - 2.3 mg/dL Final   09/09/2024 2.0 1.7 - 2.3 mg/dL Final   09/08/2024 2.1 1.7 - 2.3 mg/dL Final          I/O:  I/O last 3 completed shifts:  In: 790 [P.O.:790]  Out: 3125 [Urine:3125]       Physical Exam:    General: Patient seen up in chair, NAD. Conversant, pleasant, calm, cooperative on exam.  HEENT: no sclera icterus, moist mucosa  CV: RRR on monitor. mild LE edema. Incision C/D/I, no erythema or induration  Pulm: Unlabored breathing on room air. Chest tubes in place, serous output, no air leak  Abd: SNTND  Ext: Mild pedal edema, warm  Neuro: A&O, CNs grossly intact, face symmetric, speech clear      ASSESSMENT/PLAN:    Maria Ines Molina is a 69 year old female with a history of bicuspid aortic valve and aortic insufficiency with ascending aortic aneurysm who is s/p bioprosthetic AVR and ascending aortic aneurysm repair.    Active Problems:    Aneurysm of ascending aorta without rupture (H24)        NEURO:  - Scheduled Tylenol/lidocaine patches and PRN Tylenol/oxycodone/Flexeril for pain    CV:  - Normotensive  - Binh CT and TPW removed 9/8, pericardial drain removed 9/9  - Metoprolol 50 mg BID with hold parameters  - ASA 81mg  - Hold PTA lisinopril  - Statin not indicated  - Diuresis as below  - New baseline echo today.  - Eliquis x3 months postop for bioAVR. Ok for DOAC per Andrew. Patient ok w/  OOP costs if able to avoid warfarin.    PULM:  - Maintaining oxygen saturations on RA  - Encourage pulmonary toilet    GI  NUTRITION:  - Diet: Regular  - Bowel regimen; LBM preop - increase bowel meds todaay    RENAL  FE:  - Good UOP, net -2.3 L yesterday although still feeling edematous  - Cr stable at baseline.  - Diuresis with 20mg IV Lasix TID + diamox today to prevent contraction alkalosis  - Continue electrolyte replacement protocol    HEME:  - Acute blood loss anemia  - Hgb stable, no bleeding concerns. Hep SQ, ASA    ID:  - Dorothy op ppx complete, afebrile; no concerns for infection    ENDO:  - HbA1c 5.5%, euglycemic    PPx:  - DVT: SCDs, SQ heparin TID, OOB/ambulation   - GI: Protonix 40mg PO daily    DISPO:  - General telemetry status (Transferred POD#2)  - Medically Ready for Discharge: Anticipated Tomorrow pending BM        Patient seen w/ Dr. Brambila and Dr. Snow. Discussed with Dr. Morales.      Carissa Patel PA-C  [facility] Cardiothoracic Surgery  Pager: [phone]  September 10, 2024

## 2024-09-10 NOTE — PLAN OF CARE
Problem: Adult Inpatient Plan of Care  Goal: Readiness for Transition of Care  Outcome: Progressing   Goal Outcome Evaluation:    Patient ambulating well, doing calf raises in her room and is very pleasant. No bowel movement yet, she would like to try Milk of Magnesium in the morning if still no bowel movement. Resting between cares. Will have shower tomorrow.

## 2024-09-10 NOTE — DISCHARGE SUMMARY
Cardiovascular Surgery Discharge Summary    Primary Care Physician:  Trinity Lemus Np  Discharge Provider: Ivon Patel PA-C   Admission Date: 9/5/2024   Admission Diagnoses: Aortic valve disease [I35.9]  Aneurysm of ascending aorta without rupture (H24) [I71.21]  Other specified symptoms and signs involving the circulatory and respiratory systems [R09.89]   Discharge Date: 9/11/2024  Disposition: Home   Condition at Discharge: Good  Code Status: Full Code     Principal Diagnosis:   Aortic valve disease s/p bioAVR, ascending aortic aneurysm repair, and left atrial appendage excision    Discharge Diagnoses:    Active Problems:  Patient Active Problem List   Diagnosis    Knee osteoarthritis    Obesity    Status post coronary angiogram    Coronary artery disease involving native coronary artery of native heart without angina pectoris    Essential hypertension, benign    Hyperlipidemia    Pain in joint, lower leg          Consult/s: Dietary, critical care medicine, cardiology      Surgery:   9/5/2024 with Dr. Morales    PROCEDURE PERFORMED:  1.  Wheat procedure (Aortic valve replacement with a 27 mm Goss Inspiris bioprosthetic valve and ascending aortic replacement with a 30 mm Gelweave graft from the sinotubular junction to the distal ascending aorta.  2.  Left atrial appendage excision.    OPERATIVE FINDINGS:  The aortic valve had fusion of the right and left cusps, and the cusps and annulus were severely calcified. The left main and right coronary ostia were in the usual anatomic position. After placement of a 27 mm Goss Inspiris bioprosthetic valve, the  left main and right coronary ostia were patent. After reperfusion, sinus rhythm resumed. There was no paravalvular leak and normal prosthetic valve function.  Left ventricular function was normal preoperatively and unchanged after bypass on low-dose inotropic support.     Discharge Medications:        Review of your medicines        START  taking        Dose / Directions   acetaminophen 325 MG tablet  Commonly known as: TYLENOL  Used for: S/P AVR      Dose: 650 mg  Take 2 tablets (650 mg) by mouth every 4 hours as needed for other (For optimal non-opioid multimodal pain management to improve pain control.).  Quantity: 60 tablet  Refills: 0     apixaban ANTICOAGULANT 5 MG tablet  Commonly known as: ELIQUIS  Indication: DVT-PE Prophylaxis, x3 months s/p bioAVR. Ok per Andrew.  Used for: S/P AVR      Dose: 5 mg  Take 1 tablet (5 mg) by mouth 2 times daily. Ok to stop 3 months postop (12/5/2024)  Quantity: 60 tablet  Refills: 3     aspirin 81 MG chewable tablet  Commonly known as: ASA  Used for: S/P AVR      Dose: 81 mg  Start taking on: September 12, 2024  Take 1 tablet (81 mg) by mouth daily.  Quantity: 90 tablet  Refills: 3     Lidocaine 4 % Patch  Commonly known as: LIDOCARE  Used for: S/P AVR      Dose: 1-2 patch  Place 1-2 patches over 12 hours onto the skin every 24 hours. To prevent lidocaine toxicity, patient should be patch free for 12 hrs daily.  Quantity: 6 patch  Refills: 0     metoprolol tartrate 50 MG tablet  Commonly known as: LOPRESSOR  Used for: S/P AVR      Dose: 50 mg  Take 1 tablet (50 mg) by mouth 2 times daily.  Quantity: 180 tablet  Refills: 3     oxyCODONE 5 MG tablet  Commonly known as: ROXICODONE  Used for: S/P AVR      Dose: 2.5 mg  Take 0.5 tablets (2.5 mg) by mouth every 4 hours as needed for moderate pain.  Quantity: 10 tablet  Refills: 0     polyethylene glycol 17 GM/Dose powder  Commonly known as: MIRALAX  Used for: S/P AVR      Dose: 17 g  Take 17 g by mouth daily.  Quantity: 510 g  Refills: 0            CONTINUE these medicines which have NOT CHANGED        Dose / Directions   multivitamin w/minerals tablet      Dose: 1 tablet  Take 1 tablet by mouth daily  Refills: 0            STOP taking      lisinopril 10 MG tablet  Commonly known as: ZESTRIL                  Where to get your medicines        These medications  "were sent to [place] [id number] [facility]  [address], [facility] [address]      Phone: [phone]   acetaminophen 325 MG tablet  apixaban ANTICOAGULANT 5 MG tablet  aspirin 81 MG chewable tablet  Lidocaine 4 % Patch  metoprolol tartrate 50 MG tablet  oxyCODONE 5 MG tablet  polyethylene glycol 17 GM/Dose powder           Discharge Instructions:    Follow up appointment with Primary Care Physician: Trinity Lemus Np within 7 days of discharge.  Follow up appointment with Specialist:    Follow with CV Surgery as scheduled.    Follow-up with cardiology as scheduled    Diet: Cardiac    Activity/Restrictions: As tolerated with sternal precautions in mind (see below). No driving for 4 weeks or while on pain medication.     - Shower and wash your incisions daily with soap and water. No tub baths/hot tubs for 4 weeks. An antibacterial soap such as Dial or Safeguard is recommended.    - Check your incisions every day. If you notice any redness, drainage, or anything unusual, please call the surgeons office.    - No driving for 4 weeks after surgery or while on pain medication     - If you have had a valve repair or replacement, check with your cardiologist regarding the need for antibiotics before dental visits or other medical procedures.    - Do not lift anything more than 10 pounds for 8 weeks after surgery. After 8 weeks, advance lifting gradually as tolerated.    - You may have watery drainage from your chest tube site for 2-3 weeks after surgery. Your may cover with a Band-Aid to protect your clothing. Remove the Band-Aid every day and wash the site.    - If you have a leg lesion, you may have swelling for 2-3 months. Elevate your leg any time you are not walking.    - If you feel any \"popping\" or \"clicking\" sensations in your chest, your arms are out too far or you are putting too much weight into arm movements. Do not reach over your head or out to " the side to pull something. Do not do any arm exercises or use any exercise equipment that involves arm movement. If you feel your sternum moving, call the surgeon's office.    - Increase your daily activity as explained by Cardiac Rehab. You are encouraged to enroll in an Outpatient Cardiac Rehab Program.    - No active sports using your upper arms for 3 months. This includes fishing, hunting, bowling, swimming, tennis or golf.    - No physical activity such as cutting the grass, raking, vacuuming, changing sheets on your bed, snow shoveling, or using a  for 3 months.    - Use incentive spirometer 6-8 times per day for 2 weeks.       Hospital Summary:   Maria Ines Molina is a 69 year old female who was admitted to [facility] on 9/5/2024 for planned elective cardiac surgery. She was referred to CV surgery for evaluation for possible aortic valve replacement and ascending aortic aneurysm repair in s/o bicuspid aortic valve.     Patient was deemed an appropriate candidate for bioAVR, ascending aortic aneurysm repair, and left atrial appendage excision, and was taken to the operating room on 9/5/2024 where patient underwent the above procedures. Surgery was uneventful and patient was brought to the ICU post-operatively. She was extubated on POD#0 and weaned from pressors. Patient was awake and alert, afebrile, and with stable vitals. Insulin drip was discontinued and she was transitioned to a sliding scale. She was transferred to general telemetry status on POD#2 where patient has had return of bowel function, is maintaining oxygen saturations on room air, had their chest tubes removed, and has no complaints of chest pain or shortness of breath. On 09/11/24 (POD#6) patient was stable enough to be discharged to home.    Of note:  - New baseline echo completed inpatient.   - Anticoagulation x3 months postop s/p bioAVR. Per surgeon, ok for DOAC. Patient accepting of OOP costs and would prefer DOAC over  warfarin even if more expensive. Sent w/ 3 months eliquis.   - Patient is below preop weight, so will not be sent w/ additional diuresis and K+ supplementation. Patient should call clinic if they gain > 3lbs in one day or > 5lbs in one week.    Vital signs:  /70 (BP Location: Left arm, Patient Position: Chair)   Pulse 81   Temp 98.5  F (36.9  C) (Oral)   Resp 20   Wt 90.7 kg (200 lb)   SpO2 95%   BMI 33.28 kg/m     200 lbs 0 oz   Body mass index is 33.28 kg/m .       Physical Exam:    Pertinent exam findings on day of discharge include:  Gen: Seen up in chair. NAD. Pleasant and conversant.   CV: RRR on monitor. No appreciable edema.  Pulm: Non-labored breathing on room air.  Abd: Soft, non-tender, non-distended  Neuro: CNs grossly intact  Inc: C/D/I      Carissa Patel PA-C  [facility] Cardiothoracic Surgery  Pager: [phone]  September 11, 2024

## 2024-09-11 ENCOUNTER — APPOINTMENT (OUTPATIENT)
Dept: OCCUPATIONAL THERAPY | Facility: HOSPITAL | Age: 69
DRG: 220 | End: 2024-09-11
Attending: SURGERY
Payer: MEDICARE

## 2024-09-11 VITALS
HEART RATE: 75 BPM | SYSTOLIC BLOOD PRESSURE: 113 MMHG | WEIGHT: 200 LBS | RESPIRATION RATE: 20 BRPM | BODY MASS INDEX: 33.28 KG/M2 | OXYGEN SATURATION: 97 % | DIASTOLIC BLOOD PRESSURE: 67 MMHG | TEMPERATURE: 98.5 F

## 2024-09-11 PROBLEM — R09.89 OTHER SPECIFIED SYMPTOMS AND SIGNS INVOLVING THE CIRCULATORY AND RESPIRATORY SYSTEMS: Status: ACTIVE | Noted: 2024-09-11

## 2024-09-11 PROBLEM — I71.20 THORACIC AORTIC ANEURYSM WITHOUT RUPTURE (H): Status: RESOLVED | Noted: 2020-08-11 | Resolved: 2024-09-11

## 2024-09-11 PROBLEM — Q23.81 BICUSPID AORTIC VALVE DETERMINED BY IMAGING: Status: RESOLVED | Noted: 2021-02-13 | Resolved: 2024-09-11

## 2024-09-11 PROBLEM — I71.20 THORACIC AORTIC ANEURYSM WITHOUT RUPTURE (H): Status: ACTIVE | Noted: 2020-08-11

## 2024-09-11 PROBLEM — M25.569 PAIN IN JOINT, LOWER LEG: Status: ACTIVE | Noted: 2024-09-11

## 2024-09-11 PROBLEM — I10 ESSENTIAL HYPERTENSION, BENIGN: Status: ACTIVE | Noted: 2020-08-11

## 2024-09-11 PROBLEM — R09.89 OTHER SPECIFIED SYMPTOMS AND SIGNS INVOLVING THE CIRCULATORY AND RESPIRATORY SYSTEMS: Status: RESOLVED | Noted: 2024-09-11 | Resolved: 2024-09-11

## 2024-09-11 PROBLEM — E78.5 HYPERLIPIDEMIA: Status: ACTIVE | Noted: 2020-08-11

## 2024-09-11 LAB
MAGNESIUM SERPL-MCNC: 2.3 MG/DL (ref 1.7–2.3)
PHOSPHATE SERPL-MCNC: 3.7 MG/DL (ref 2.5–4.5)
POTASSIUM SERPL-SCNC: 3.8 MMOL/L (ref 3.4–5.3)

## 2024-09-11 PROCEDURE — 84132 ASSAY OF SERUM POTASSIUM: CPT | Performed by: SURGERY

## 2024-09-11 PROCEDURE — 250N000013 HC RX MED GY IP 250 OP 250 PS 637: Performed by: SURGERY

## 2024-09-11 PROCEDURE — 36415 COLL VENOUS BLD VENIPUNCTURE: CPT | Performed by: SURGERY

## 2024-09-11 PROCEDURE — 250N000013 HC RX MED GY IP 250 OP 250 PS 637

## 2024-09-11 PROCEDURE — 84100 ASSAY OF PHOSPHORUS: CPT | Performed by: SURGERY

## 2024-09-11 PROCEDURE — 83735 ASSAY OF MAGNESIUM: CPT | Performed by: SURGERY

## 2024-09-11 PROCEDURE — 97110 THERAPEUTIC EXERCISES: CPT | Mod: GO

## 2024-09-11 PROCEDURE — 97535 SELF CARE MNGMENT TRAINING: CPT | Mod: GO

## 2024-09-11 RX ORDER — ACETAMINOPHEN 325 MG/1
650 TABLET ORAL EVERY 4 HOURS PRN
Qty: 60 TABLET | Refills: 0 | Status: SHIPPED | OUTPATIENT
Start: 2024-09-11 | End: 2024-09-20

## 2024-09-11 RX ORDER — POTASSIUM CHLORIDE 1500 MG/1
20 TABLET, EXTENDED RELEASE ORAL ONCE
Status: COMPLETED | OUTPATIENT
Start: 2024-09-11 | End: 2024-09-11

## 2024-09-11 RX ORDER — OXYCODONE HYDROCHLORIDE 5 MG/1
2.5 TABLET ORAL EVERY 4 HOURS PRN
Qty: 10 TABLET | Refills: 0 | Status: SHIPPED | OUTPATIENT
Start: 2024-09-11 | End: 2024-09-20

## 2024-09-11 RX ORDER — METOPROLOL TARTRATE 50 MG
50 TABLET ORAL 2 TIMES DAILY
Qty: 180 TABLET | Refills: 3 | Status: SHIPPED | OUTPATIENT
Start: 2024-09-11 | End: 2024-10-01

## 2024-09-11 RX ORDER — ASPIRIN 81 MG/1
81 TABLET, CHEWABLE ORAL DAILY
Qty: 90 TABLET | Refills: 3 | Status: SHIPPED | OUTPATIENT
Start: 2024-09-12

## 2024-09-11 RX ORDER — LIDOCAINE 4 G/G
1-2 PATCH TOPICAL EVERY 24 HOURS
Qty: 6 PATCH | Refills: 0 | Status: SHIPPED | OUTPATIENT
Start: 2024-09-11 | End: 2024-09-20

## 2024-09-11 RX ORDER — POLYETHYLENE GLYCOL 3350 17 G/17G
17 POWDER, FOR SOLUTION ORAL DAILY
Qty: 510 G | Refills: 0 | Status: SHIPPED | OUTPATIENT
Start: 2024-09-11 | End: 2024-09-20

## 2024-09-11 RX ADMIN — METOPROLOL TARTRATE 50 MG: 25 TABLET, FILM COATED ORAL at 08:00

## 2024-09-11 RX ADMIN — ACETAMINOPHEN 975 MG: 325 TABLET ORAL at 04:42

## 2024-09-11 RX ADMIN — ASPIRIN 81 MG CHEWABLE TABLET 81 MG: 81 TABLET CHEWABLE at 07:56

## 2024-09-11 RX ADMIN — PANTOPRAZOLE SODIUM 40 MG: 40 TABLET, DELAYED RELEASE ORAL at 07:56

## 2024-09-11 RX ADMIN — APIXABAN 5 MG: 5 TABLET, FILM COATED ORAL at 07:59

## 2024-09-11 RX ADMIN — Medication 1 TABLET: at 07:57

## 2024-09-11 RX ADMIN — SENNOSIDES AND DOCUSATE SODIUM 1 TABLET: 8.6; 5 TABLET ORAL at 07:56

## 2024-09-11 RX ADMIN — POLYETHYLENE GLYCOL 3350 17 G: 17 POWDER, FOR SOLUTION ORAL at 07:56

## 2024-09-11 RX ADMIN — OXYCODONE HYDROCHLORIDE 2.5 MG: 5 TABLET ORAL at 11:43

## 2024-09-11 RX ADMIN — OXYCODONE HYDROCHLORIDE 2.5 MG: 5 TABLET ORAL at 06:43

## 2024-09-11 RX ADMIN — POTASSIUM CHLORIDE 20 MEQ: 1500 TABLET, EXTENDED RELEASE ORAL at 07:56

## 2024-09-11 RX ADMIN — ACETAMINOPHEN 650 MG: 325 TABLET ORAL at 11:43

## 2024-09-11 ASSESSMENT — ACTIVITIES OF DAILY LIVING (ADL)
ADLS_ACUITY_SCORE: 24
ADLS_ACUITY_SCORE: 24
ADLS_ACUITY_SCORE: 23
ADLS_ACUITY_SCORE: 24
ADLS_ACUITY_SCORE: 23
ADLS_ACUITY_SCORE: 24
ADLS_ACUITY_SCORE: 24
ADLS_ACUITY_SCORE: 23
ADLS_ACUITY_SCORE: 24
ADLS_ACUITY_SCORE: 23
ADLS_ACUITY_SCORE: 25
ADLS_ACUITY_SCORE: 24

## 2024-09-11 NOTE — PLAN OF CARE
Problem: Adult Inpatient Plan of Care  Goal: Plan of Care Review  Description: The Plan of Care Review/Shift note should be completed every shift.  The Outcome Evaluation is a brief statement about your assessment that the patient is improving, declining, or no change.  This information will be displayed automatically on your shift  note.  Outcome: Progressing     Problem: Risk for Delirium  Goal: Optimal Coping  Outcome: Progressing  Intervention: Optimize Psychosocial Adjustment to Delirium  Recent Flowsheet Documentation  Taken 9/10/2024 2127 by Noe Chaidez RN  Supportive Measures:   active listening utilized   verbalization of feelings encouraged  Taken 9/10/2024 1624 by Noe Chaidez RN  Supportive Measures:   active listening utilized   verbalization of feelings encouraged  Goal: Improved Behavioral Control  Intervention: Minimize Safety Risk  Recent Flowsheet Documentation  Taken 9/10/2024 2127 by Noe Chaidez RN  Enhanced Safety Measures: patient/family teach back on injury risk  Taken 9/10/2024 1624 by Noe Chaidez RN  Enhanced Safety Measures: patient/family teach back on injury risk  Goal: Improved Attention and Thought Clarity  Intervention: Maximize Cognitive Function  Recent Flowsheet Documentation  Taken 9/10/2024 2127 by Noe Chaidez RN  Reorientation Measures:   calendar in view   clock in view  Taken 9/10/2024 1624 by Noe Chaidez RN  Reorientation Measures:   calendar in view   clock in view     Problem: Skin Injury Risk Increased  Goal: Skin Health and Integrity  Outcome: Progressing  Intervention: Plan: Nurse Driven Intervention: Moisture Management  Recent Flowsheet Documentation  Taken 9/10/2024 2127 by Noe Chaidez RN  Moisture Interventions: Encourage regular toileting  Taken 9/10/2024 1624 by Noe Chaidez RN  Moisture Interventions: Encourage regular toileting  Intervention: Plan: Nurse Driven Intervention: Friction and Shear  Recent Flowsheet Documentation  Taken 9/10/2024 2127  by Noe Chaidez RN  Friction/Shear Interventions: HOB 30 degrees or less  Taken 9/10/2024 1624 by Noe Chaidez RN  Friction/Shear Interventions: HOB 30 degrees or less  Intervention: Optimize Skin Protection  Recent Flowsheet Documentation  Taken 9/10/2024 2127 by Noe Chaidez RN  Activity Management: activity adjusted per tolerance  Head of Bed (HOB) Positioning: HOB at 30 degrees  Taken 9/10/2024 1624 by Noe Chaidez RN  Activity Management: activity adjusted per tolerance  Head of Bed (HOB) Positioning: HOB at 30 degrees     Problem: Pain Acute  Goal: Optimal Pain Control and Function  Outcome: Progressing  Intervention: Develop Pain Management Plan  Recent Flowsheet Documentation  Taken 9/10/2024 1624 by Noe Chaidez RN  Pain Management Interventions: emotional support  Intervention: Prevent or Manage Pain  Recent Flowsheet Documentation  Taken 9/10/2024 2127 by Noe Chaidez RN  Medication Review/Management: medications reviewed  Taken 9/10/2024 1624 by Noe Chaidez RN  Medication Review/Management: medications reviewed  Intervention: Optimize Psychosocial Wellbeing  Recent Flowsheet Documentation  Taken 9/10/2024 2127 by Noe Chaidez RN  Supportive Measures:   active listening utilized   verbalization of feelings encouraged  Taken 9/10/2024 1624 by Noe Chaidez RN  Supportive Measures:   active listening utilized   verbalization of feelings encouraged     Problem: Cardiovascular Surgery  Goal: Improved Activity Tolerance  Outcome: Progressing  Intervention: Optimize Tolerance for Activity  Recent Flowsheet Documentation  Taken 9/10/2024 2127 by Noe Chaidez RN  Environmental Support: calm environment promoted  Taken 9/10/2024 1624 by Noe Chaidez RN  Environmental Support: calm environment promoted  Goal: Optimal Coping with Heart Surgery  Intervention: Support Psychosocial Response to Surgery  Recent Flowsheet Documentation  Taken 9/10/2024 2127 by Noe Chaidez RN  Supportive Measures:   active  listening utilized   verbalization of feelings encouraged  Taken 9/10/2024 1624 by Noe Chaidez RN  Supportive Measures:   active listening utilized   verbalization of feelings encouraged  Goal: Absence of Bleeding  Outcome: Progressing  Goal: Effective Bowel Elimination  Outcome: Progressing  Goal: Optimal Cerebral Tissue Perfusion  Intervention: Protect and Optimize Cerebral Perfusion  Recent Flowsheet Documentation  Taken 9/10/2024 2127 by Noe Chaidez RN  Head of Bed (HOB) Positioning: HOB at 30 degrees  Taken 9/10/2024 1624 by Noe Chaidez RN  Head of Bed (HOB) Positioning: HOB at 30 degrees  Goal: Absence of Infection Signs and Symptoms  Outcome: Progressing  Intervention: Prevent or Manage Infection  Recent Flowsheet Documentation  Taken 9/10/2024 2127 by Noe Chaidez RN  Infection Prevention:   hand hygiene promoted   rest/sleep promoted  Taken 9/10/2024 1624 by Noe Chaidez RN  Infection Prevention:   hand hygiene promoted   rest/sleep promoted  Goal: Anesthesia/Sedation Recovery  Intervention: Optimize Anesthesia Recovery  Recent Flowsheet Documentation  Taken 9/10/2024 2127 by Noe Chaidez RN  Safety Promotion/Fall Prevention:   activity supervised   nonskid shoes/slippers when out of bed   patient and family education   safety round/check completed  Reorientation Measures:   calendar in view   clock in view  Taken 9/10/2024 1624 by Noe Chaidez RN  Safety Promotion/Fall Prevention:   activity supervised   nonskid shoes/slippers when out of bed   patient and family education   safety round/check completed  Reorientation Measures:   calendar in view   clock in view  Level Incentive Spirometer (mL): 1500  Number of Repetitions (IS): 10  Goal: Acceptable Pain Control  Outcome: Progressing  Intervention: Prevent or Manage Pain  Recent Flowsheet Documentation  Taken 9/10/2024 1624 by Noe Chaidez RN  Pain Management Interventions: emotional support  Goal: Effective Urinary Elimination  Outcome:  Progressing  Goal: Effective Oxygenation and Ventilation  Outcome: Progressing  Intervention: Promote Airway Secretion Clearance  Recent Flowsheet Documentation  Taken 9/10/2024 1624 by Noe Chaidez RN  Level Incentive Spirometer (mL): 1500  Number of Repetitions (IS): 10   Goal Outcome Evaluation:                    ..sc  Patient Name: Maria Ines Molina   MRN: [id number]   Date of Admission: 9/5/2024    Procedure: Procedure(s):  AORTIC VALVE REPLACEMENT AND ASCENDING AORTIC REPLACEMENT, LEFT ATRIAL APPENDAGE EXCISION, EPIAORTIC ULTRASOUND  ANESTHESIA TRANSESOPHAGEAL ECHOCARDIOGRAM    Post Op day #:5    Subjective (Patient focus/Primary Problem for shift): pain control and prevent constipation          Pain Goal 0 Pain Rating 0/10          Pain Medication/ Regime effective to reduce patient pain scheduled Acertaminophen and prn oxycodone    Objective (Physical assessment):           Rhythm: normal sinus rhythm            Bowel Activity: yes if Yes indicate when: 9/10          Bowel Medications: yes            Incision: healing well          Incentive Spirometry Q 1-2 hour when awake:  yes Volume: 1500          Epicardial Pacing Wires:  no            Patient Activity:           Up to chair for meals: yes          Ambulation with RN x2 (Not including CR): yes            Is patient in home clothes:no             Chest Tubes   Pleural: no Draining: not applicable               Suction: not applicable              Mediastinal: no Draining: not applicable               Suction: not applicable   Dressing Change Daily:no If No, why?n/a                     Urinary Catheter: not applicable           Preventative WOC consult (need MD order): no       Assessment (Nursing primary shift focus):     Alert. Oriented x 4.     Denied shortness of breath. On room air.     With mid incisional chest pain 6/10 especially with movement. Scheduled and prn pain medication helped resolve the pain. Patient able to move better.     +1 lower  extremity edema noted.     Stand by assist. Sternal precaution. Falls education, precaution and interventions placed. Patient appropriately calls for assistance.         Plan (Patient Care Plan/focus):     Pain control with less use of narcotic.  Increase activity.   Go home soon.      Noe Chaidez RN   9/10/2024   9:54 PM

## 2024-09-11 NOTE — PLAN OF CARE
"  Problem: Adult Inpatient Plan of Care  Goal: Plan of Care Review  Description: The Plan of Care Review/Shift note should be completed every shift.  The Outcome Evaluation is a brief statement about your assessment that the patient is improving, declining, or no change.  This information will be displayed automatically on your shift  note.  Outcome: Adequate for Care Transition  Goal: Patient-Specific Goal (Individualized)  Description: You can add care plan individualizations to a care plan. Examples of Individualization might be:  \"Parent requests to be called daily at 9am for status\", \"I have a hard time hearing out of my right ear\", or \"Do not touch me to wake me up as it startles  me\".  Outcome: Adequate for Care Transition  Goal: Absence of Hospital-Acquired Illness or Injury  Outcome: Adequate for Care Transition  Intervention: Identify and Manage Fall Risk  Recent Flowsheet Documentation  Taken 9/11/2024 0800 by Eveline Quijano RN  Safety Promotion/Fall Prevention:   activity supervised   clutter free environment maintained   lighting adjusted   nonskid shoes/slippers when out of bed   room door open   room near nurse's station   safety round/check completed  Intervention: Prevent Skin Injury  Recent Flowsheet Documentation  Taken 9/11/2024 0750 by Eveline Quijano RN  Body Position: position changed independently  Intervention: Prevent and Manage VTE (Venous Thromboembolism) Risk  Recent Flowsheet Documentation  Taken 9/11/2024 0800 by Eveline Quijano RN  VTE Prevention/Management: (up in the chair) SCDs off (sequential compression devices)  Intervention: Prevent Infection  Recent Flowsheet Documentation  Taken 9/11/2024 0800 by Eveline Quijano RN  Infection Prevention:   hand hygiene promoted   rest/sleep promoted  Goal: Optimal Comfort and Wellbeing  Outcome: Adequate for Care Transition  Intervention: Provide Person-Centered Care  Recent Flowsheet Documentation  Taken " 9/11/2024 0800 by Eveline Quijano RN  Trust Relationship/Rapport:   care explained   choices provided   emotional support provided   empathic listening provided   questions answered  Goal: Readiness for Transition of Care  Outcome: Adequate for Care Transition     Problem: Risk for Delirium  Goal: Optimal Coping  Outcome: Adequate for Care Transition  Intervention: Optimize Psychosocial Adjustment to Delirium  Recent Flowsheet Documentation  Taken 9/11/2024 0800 by Eveline Quijano RN  Supportive Measures:   active listening utilized   decision-making supported  Goal: Improved Behavioral Control  Outcome: Adequate for Care Transition  Intervention: Minimize Safety Risk  Recent Flowsheet Documentation  Taken 9/11/2024 0800 by Eveline Quijano RN  Communication Enhancement Strategies: call light answered in person  Enhanced Safety Measures:   patient/family teach back on injury risk   review medications for side effects with activity  Trust Relationship/Rapport:   care explained   choices provided   emotional support provided   empathic listening provided   questions answered  Goal: Improved Attention and Thought Clarity  Outcome: Adequate for Care Transition  Intervention: Maximize Cognitive Function  Recent Flowsheet Documentation  Taken 9/11/2024 0800 by Eveline Quijano RN  Sensory Stimulation Regulation:   lighting decreased   quiet environment promoted  Reorientation Measures:   calendar in view   clock in view  Goal: Improved Sleep  Outcome: Adequate for Care Transition     Problem: Skin Injury Risk Increased  Goal: Skin Health and Integrity  Outcome: Adequate for Care Transition  Intervention: Plan: Nurse Driven Intervention: Moisture Management  Recent Flowsheet Documentation  Taken 9/11/2024 0800 by Eveline Quijano RN  Moisture Interventions: Encourage regular toileting  Intervention: Plan: Nurse Driven Intervention: Friction and Shear  Recent Flowsheet  Documentation  Taken 9/11/2024 0800 by Eveline Quijano RN  Friction/Shear Interventions: HOB 30 degrees or less  Intervention: Optimize Skin Protection  Recent Flowsheet Documentation  Taken 9/11/2024 0750 by Eveline Quijano RN  Activity Management:   activity adjusted per tolerance   up in chair     Problem: Pain Acute  Goal: Optimal Pain Control and Function  Outcome: Adequate for Care Transition  Intervention: Prevent or Manage Pain  Recent Flowsheet Documentation  Taken 9/11/2024 0800 by Eveline Quijano RN  Sensory Stimulation Regulation:   lighting decreased   quiet environment promoted  Medication Review/Management: medications reviewed  Intervention: Optimize Psychosocial Wellbeing  Recent Flowsheet Documentation  Taken 9/11/2024 0800 by Eveline Quijano RN  Supportive Measures:   active listening utilized   decision-making supported     Problem: Fall Injury Risk  Goal: Absence of Fall and Fall-Related Injury  Outcome: Adequate for Care Transition  Intervention: Identify and Manage Contributors  Recent Flowsheet Documentation  Taken 9/11/2024 0800 by Eveline Quijano RN  Medication Review/Management: medications reviewed  Intervention: Promote Injury-Free Environment  Recent Flowsheet Documentation  Taken 9/11/2024 0800 by Eveline Quijano RN  Safety Promotion/Fall Prevention:   activity supervised   clutter free environment maintained   lighting adjusted   nonskid shoes/slippers when out of bed   room door open   room near nurse's station   safety round/check completed     Problem: Cardiovascular Surgery  Goal: Improved Activity Tolerance  Outcome: Adequate for Care Transition  Intervention: Optimize Tolerance for Activity  Recent Flowsheet Documentation  Taken 9/11/2024 0800 by Eveline Quijano RN  Environmental Support: calm environment promoted  Goal: Optimal Coping with Heart Surgery  Outcome: Adequate for Care Transition  Intervention: Support  Psychosocial Response to Surgery  Recent Flowsheet Documentation  Taken 9/11/2024 0800 by Eveline Quijano RN  Supportive Measures:   active listening utilized   decision-making supported  Goal: Absence of Bleeding  Outcome: Adequate for Care Transition  Goal: Effective Bowel Elimination  Outcome: Adequate for Care Transition  Goal: Effective Cardiac Function  Outcome: Adequate for Care Transition  Goal: Optimal Cerebral Tissue Perfusion  Outcome: Adequate for Care Transition  Intervention: Protect and Optimize Cerebral Perfusion  Recent Flowsheet Documentation  Taken 9/11/2024 0800 by Eveline Quijano RN  Sensory Stimulation Regulation:   lighting decreased   quiet environment promoted  Goal: Fluid and Electrolyte Balance  Outcome: Adequate for Care Transition  Goal: Blood Glucose Level Within Targeted Range  Outcome: Adequate for Care Transition  Goal: Absence of Infection Signs and Symptoms  Outcome: Adequate for Care Transition  Intervention: Prevent or Manage Infection  Recent Flowsheet Documentation  Taken 9/11/2024 0800 by Eveline Quijano RN  Infection Prevention:   hand hygiene promoted   rest/sleep promoted  Goal: Anesthesia/Sedation Recovery  Outcome: Adequate for Care Transition  Intervention: Optimize Anesthesia Recovery  Recent Flowsheet Documentation  Taken 9/11/2024 0800 by Eveline Quijano RN  Safety Promotion/Fall Prevention:   activity supervised   clutter free environment maintained   lighting adjusted   nonskid shoes/slippers when out of bed   room door open   room near nurse's station   safety round/check completed  Administration (IS): self-administered  Reorientation Measures:   calendar in view   clock in view  Level Incentive Spirometer (mL): 1500  Number of Repetitions (IS): 6  Goal: Acceptable Pain Control  Outcome: Adequate for Care Transition  Goal: Nausea and Vomiting Relief  Outcome: Adequate for Care Transition  Goal: Effective Urinary  Elimination  Outcome: Adequate for Care Transition  Goal: Effective Oxygenation and Ventilation  Outcome: Adequate for Care Transition  Intervention: Promote Airway Secretion Clearance  Recent Flowsheet Documentation  Taken 9/11/2024 0800 by Eveline Quijano RN  Administration (IS): self-administered  Level Incentive Spirometer (mL): 1500  Cough And Deep Breathing: done independently per patient  Number of Repetitions (IS): 6   Goal Outcome Evaluation:       Discharge education reviewed by the writer with the patient. Patient agreeable except feels the metoprolol needs to be a lower dose. Will up date the Doctor. Belongings packed and  coming at 2:30 PM to take patient home with the family. Medications given to the patient. Call light in reach.

## 2024-09-11 NOTE — PROGRESS NOTES
sc  Patient Name: Maria Ines Molina   MRN: [id number]   Date of Admission: 9/5/2024    Procedure: Procedure(s):  AORTIC VALVE REPLACEMENT AND ASCENDING AORTIC REPLACEMENT, LEFT ATRIAL APPENDAGE EXCISION, EPIAORTIC ULTRASOUND  ANESTHESIA TRANSESOPHAGEAL ECHOCARDIOGRAM    Post Op day #:6    Subjective (Patient focus/Primary Problem for shift): ain          Pain Goal0 Pain Rating0           Pain Medication/ Regime effective to reduce patient painscheduled tylenol, oxycodone    Objective (Physical assessment):           Rhythm: normal sinus rhythm            Bowel Activity: yes if Yes indicate when: today and yesterday          Bowel Medications: yes            Incision: healing well          Incentive Spirometry Q 1-2 hour when awake:  yes Volume: 1500          Epicardial Pacing Wires:  no            Patient Activity:           Up to chair for meals: yes          Ambulation with RN x2 (Not including CR): yes            Is patient in home clothes:no             Chest Tubes   Pleural: not applicable Draining: not applicable               Suction: not applicable              Mediastinal: not applicable Draining: not applicable               Suction: not applicable   Dressing Change Daily:not applicable                      Urinary Catheter: not applicable           Preventative WOC consult (need MD order): not applicable       Assessment (Nursing primary shift focus): Patient alert and oriented x 4. NSR. Stated pain med helped her incisional discomfort. Still has edema in feet and reminded to elevate legs in recliner chair. Watched DVD on going home after heart surgery and no further questions.     Plan (Patient Care Plan/focus): Plan for discharge late afternoon when  done with therapy. Patient educated on going home after heart surgery. She said she is comfortable and looking forward to discharge. Doctors in earlier to discuss plan of care. Call light in reach.       Eveline Quijano RN   9/11/2024   9:33  AM

## 2024-09-11 NOTE — PLAN OF CARE
Cardiac Rehab Discharge Summary    Reason for therapy discharge:    Discharged to home with outpatient therapy.    Progress towards therapy goal(s). See goals on Care Plan in [facility] electronic health record for goal details.  Goals met    Therapy recommendation(s):    Continue home exercise program.    Samantha MODI, OTR/L, CLT 9/11/2024 , 2:35 PM

## 2024-09-11 NOTE — PLAN OF CARE
Patient Name: Maria Ines Molina   MRN: [id number]   Date of Admission: 2024    Procedure: Procedure(s):  AORTIC VALVE REPLACEMENT AND ASCENDING AORTIC REPLACEMENT, LEFT ATRIAL APPENDAGE EXCISION, EPIAORTIC ULTRASOUND  ANESTHESIA TRANSESOPHAGEAL ECHOCARDIOGRAM    Post Op day #:6    Subjective (Patient focus/Primary Problem for shift):           Pain Goal: No pain Pain Ratin/10           Pain Medication/ Regime effective to reduce patient pain: Scheduled tylenol & PRN oxycodone    Objective (Physical assessment):           Rhythm: normal sinus rhythm            Bowel Activity: yes if Yes indicate when: 9/10          Bowel Medications: yes            Incision: healing well          Incentive Spirometry Q 1-2 hour when awake:  yes Volume: NA          Epicardial Pacing Wires:  no            Patient Activity:           Up to chair for meals: yes          Ambulation with RN x2 (Not including CR): yes            Is patient in home clothes:no             Chest Tubes   Pleural: not applicable Draining: not applicable               Suction: not applicable              Mediastinal: not applicable Draining: not applicable               Suction: not applicable   Dressing Change Daily:no If No, why? Removed                     Urinary Catheter: no           Preventative WOC consult (need MD order): no       Assessment (Nursing primary shift focus): Pt endorses pain 4/10 in her incision. Pain being managed well per pt. Up ambulating with staff multiple times a shift with a SBA. NSR. A/O. VSS. O2 sats in the upper 90s on RA.    Plan (Patient Care Plan/focus): Continue with bowel meds to help induce BM. Ambulate with staff and up to chair for meals.       Peace Joyce RN   2024   6:17 AM

## 2024-09-13 ENCOUNTER — TELEPHONE (OUTPATIENT)
Dept: CARDIOLOGY | Facility: CLINIC | Age: 69
End: 2024-09-13
Payer: MEDICARE

## 2024-09-13 NOTE — TELEPHONE ENCOUNTER
Called pt and left VM requesting call back, CV RN contact info provided.    Cardiovascular Surgery  [facility]    DISCHARGE FOLLOW UP PHONE CALL      POST OP MONITORING  How is your pain on a 0-10 scale, how are you managing your pain? Pain is being managed with tylenol.    ACTIVITY  How is your activity tolerance? Up and walking.  Are you still doing sternal precautions? Yes.  Do you hear any clicking when you are moving or taking a deep breath? No.    Are you weighing yourself daily? 200    SIGNS AND SYMPTOMS OF INFECTION  INCREASE IN PAIN - No  FEVER - No  DRAINAGE - No If so, color: NA  REDNESS - No  SWELLING - No    ASSISTANCE  Do you have someone at home to assist you with your daily activities? Spouse helping pt at home.    MEDICATIONS  Is someone helping you to set up your medications? Pt is managing.  Do you have any questions about your medications? No.    Are you on a blood thinner? Eliquis.  Who is managing your INRs? NA    FOLLOW UP  You are scheduled to see your primary care physician on 9/16.  You are scheduled to see our surgery advanced practive provider for post operative follow up on 10/1.    You are scheduled for cardiac rehab on 9/18.  You are scheduled to see your cardiologist on 10/7.    CONTACT INFORMATION  Please feel free to call us with any questions or symptoms that are concerning for you at [phone]. If it is after 4:00 in the afternoon, or a weekend please call [phone] and ask for the on call specialist. We want to do everything we can to help prevent you needing to return to the ED, so please do not hesitate to call us.    Anette Lazo RN  Cardiovascular Surgery  [phone]  September 13, 2024 11:08 AM      ----- Message from Anette LAZARO sent at 9/11/2024  8:22 AM CDT -----  Regarding: FW: discharge  POC 9/13  ----- Message -----  From: Ivon Patel PA-C  Sent: 9/11/2024   8:19 AM CDT  To: Anette Lazo RN  Subject: discharge                                         Discharging to home today!    - New baseline echo completed inpatient.   - Anticoagulation x3 months postop s/p bioAVR. Per surgeon, ok for DOAC. Patient accepting of OOP costs and would prefer DOAC over warfarin even if more expensive. Sent w/ 3 months eliquis.   - Patient is below preop weight, so will not be sent w/ additional diuresis and K+ supplementation. Patient should call clinic if they gain > 3lbs in one day or > 5lbs in one week.    Thanks!    -Carissa

## 2024-09-18 ENCOUNTER — HOSPITAL ENCOUNTER (OUTPATIENT)
Dept: CARDIAC REHAB | Facility: HOSPITAL | Age: 69
Discharge: HOME | End: 2024-09-18
Attending: SURGERY
Payer: MEDICARE

## 2024-09-18 DIAGNOSIS — Z95.2 S/P AVR (AORTIC VALVE REPLACEMENT): ICD-10-CM

## 2024-09-18 DIAGNOSIS — Z95.2 S/P AVR: ICD-10-CM

## 2024-09-18 PROCEDURE — 93797 PHYS/QHP OP CAR RHAB WO ECG: CPT | Mod: XU

## 2024-09-18 PROCEDURE — 93798 PHYS/QHP OP CAR RHAB W/ECG: CPT

## 2024-09-20 ENCOUNTER — HOSPITAL ENCOUNTER (OUTPATIENT)
Dept: CARDIAC REHAB | Facility: HOSPITAL | Age: 69
Discharge: HOME | End: 2024-09-20
Attending: INTERNAL MEDICINE
Payer: MEDICARE

## 2024-09-20 ENCOUNTER — TELEPHONE (OUTPATIENT)
Dept: CARDIOLOGY | Facility: CLINIC | Age: 69
End: 2024-09-20
Payer: MEDICARE

## 2024-09-20 ENCOUNTER — OFFICE VISIT (OUTPATIENT)
Dept: CARDIOLOGY | Facility: CLINIC | Age: 69
End: 2024-09-20
Payer: MEDICARE

## 2024-09-20 VITALS
BODY MASS INDEX: 32.95 KG/M2 | DIASTOLIC BLOOD PRESSURE: 75 MMHG | SYSTOLIC BLOOD PRESSURE: 108 MMHG | HEART RATE: 74 BPM | RESPIRATION RATE: 14 BRPM | WEIGHT: 198 LBS

## 2024-09-20 DIAGNOSIS — Z86.79 S/P ASCENDING AORTIC ANEURYSM REPAIR: ICD-10-CM

## 2024-09-20 DIAGNOSIS — T81.41XA SUPERFICIAL INCISIONAL INFECTION OF SURGICAL SITE: Primary | ICD-10-CM

## 2024-09-20 DIAGNOSIS — Z98.890 S/P ASCENDING AORTIC ANEURYSM REPAIR: ICD-10-CM

## 2024-09-20 DIAGNOSIS — Z95.2 S/P AVR (AORTIC VALVE REPLACEMENT): ICD-10-CM

## 2024-09-20 PROCEDURE — 99024 POSTOP FOLLOW-UP VISIT: CPT

## 2024-09-20 PROCEDURE — 93798 PHYS/QHP OP CAR RHAB W/ECG: CPT

## 2024-09-20 RX ORDER — SULFAMETHOXAZOLE/TRIMETHOPRIM 800-160 MG
1 TABLET ORAL 2 TIMES DAILY
Qty: 14 TABLET | Refills: 0 | Status: SHIPPED | OUTPATIENT
Start: 2024-09-20 | End: 2024-10-01

## 2024-09-20 RX ORDER — ACETAMINOPHEN 325 MG/1
325-650 TABLET ORAL EVERY 6 HOURS PRN
COMMUNITY
End: 2024-10-01

## 2024-09-20 RX ORDER — ACETAMINOPHEN 650 MG
TABLET, EXTENDED RELEASE ORAL
Qty: 30 ML | Refills: 0 | Status: SHIPPED | OUTPATIENT
Start: 2024-09-20

## 2024-09-20 NOTE — PATIENT INSTRUCTIONS
- You may have a superficial incisional infection.   - Bactrim twice a day for 7 days  - Betadine paint to incision for 7 days  - Continue to cleanse incision daily in shower w/ antibacterial soap  - Leave open to air unless draining onto clothes, then can cover w/ gauze.  - Call if the site develops drainage, the redness worsens, or if it does not start to improve.  - Surgically doing overall well. Sternum is stable. Incision as above.  - Hemodynamics are stable. No medication changes were needed today.  - Do not pick at the dermabond (skin glue).  - Continue strict sternal precautions.  - Follow up with your cardiologist as scheduled.  - Patient scheduled next week in clinic for her routine postop follow-up

## 2024-09-20 NOTE — PROGRESS NOTES
"BRIEF CV SURGERY CLINIC NOTE     Reason for Visit: Maria Ines Molina is a 69 year old female who is s/p  bioAVR (27 mm Goss Inspiris), ascending aortic aneurysm repair, and left atrial appendage excision w/ Dr. Morales on 9/5/2024.  Patient's hospital course was uncomplicated and she was discharged to home on POD#6. Pt. called our office w/ concerns of possible infection/necrosis and malodorous drainaged on her sternal incision on 9/20. She was seen by her PCP on 9/16 and per the physical exam documented there were no apparent concerns at that time. Pt is seen in clinic today for a wound check.      S: Patient reports incision has had \"necrotic tissue\" since Sunday or Monday. States that her PCP saw it on Monday and said to continue to monitor. Wednesday the PT in rehab also saw the same.   The past few days upon awakening, she notes that it has an unpleasant odor and possibly a larger area of concern. Reports that showering seems to remove some but that it seems to be getting larger. Denies increased pain, fevers, or spreading erythema.     O: /75 (BP Location: Right arm, Patient Position: Sitting, Cuff Size: Adult Large)   Pulse 74   Resp 14   Wt 89.8 kg (198 lb)   BMI 32.95 kg/m       Exam:  Constitutional: NAD, pleasant, conversant.  Cardiovascular: RRR. 2+ pulses in all extremities  Respiratory: Nonlabored effort on room air.  Incision: 1 cm superificial dehiscence at the superior most portion of incision  Neurologic: A&Ox3, LONG, CN grossly intact.       A/P: Maria Ines Molina is a 69 year oldfemale s/p bioAVR, ascending aortic aneurysm repair, and LAAE on 9/5/2024 presenting w/ c/o sternal incision concerns.      - Possible superficial incisional infection, more likely just superficial dehiscence  - Exposed monocryl cut  - Bactrim twice a day for 7 days  - Betadine paint to incision for 7 days  - Continue to cleanse incision daily in shower w/ antibacterial soap  - Leave open to air unless draining " onto clothes, then can cover w/ gauze.  - Call if the site develops drainage, the redness worsens, or if it does not start to improve.  - Surgically doing overall well. Sternum is stable. Incision as above.  - Hemodynamics are stable. No medication changes were needed today.  - Do not pick at the dermabond (skin glue).  - Continue strict sternal precautions.  - Follow up with your cardiologist as scheduled.  - Patient scheduled next week in clinic for her routine postop follow-up        Carissa Patel PA-C  [facility] Cardiothoracic Surgery  Pager: [phone]  September 20, 2024

## 2024-09-23 ENCOUNTER — HOSPITAL ENCOUNTER (OUTPATIENT)
Dept: CARDIAC REHAB | Facility: HOSPITAL | Age: 69
Discharge: HOME | End: 2024-09-23
Attending: INTERNAL MEDICINE
Payer: MEDICARE

## 2024-09-23 PROCEDURE — 93798 PHYS/QHP OP CAR RHAB W/ECG: CPT

## 2024-09-25 ENCOUNTER — HOSPITAL ENCOUNTER (OUTPATIENT)
Dept: CARDIAC REHAB | Facility: HOSPITAL | Age: 69
Discharge: HOME | End: 2024-09-25
Attending: INTERNAL MEDICINE
Payer: MEDICARE

## 2024-09-25 PROCEDURE — 93798 PHYS/QHP OP CAR RHAB W/ECG: CPT

## 2024-09-27 ENCOUNTER — HOSPITAL ENCOUNTER (OUTPATIENT)
Dept: CARDIAC REHAB | Facility: HOSPITAL | Age: 69
Discharge: HOME | End: 2024-09-27
Attending: INTERNAL MEDICINE
Payer: MEDICARE

## 2024-09-27 NOTE — PROGRESS NOTES
"  HEART CARE FOLLOW UP NOTE      [facility]  [phone]      Assessment/Recommendations   Assessment: 69 year old female with recent bio-AVR and aneurysm repair    Plan:  Aortic stenosis with aortopathy  Doing well after repair 9/5/2024, on antibiotics for sternal wound infection      -Continue Eliquis 5mg BID x 3 months, 12/5/2024      -Annual echocardiogram due 9/2025      -Her 1st degree have been advised to get screening, daughter got screened and negative but others have not followed through       -Return to clinic 1 year     HTN  A bit low, no orthostatic symptoms       -Continue Lopressor 50mg in am and 37.5mg in pm     Hyperlipidemia   LDL = 95      -Lifestyle modification      -Add Lipitor 20mg for LDL < 70 for nonocclusive CAD on angiogram     The longitudinal plan of care for the diagnosis(es)/condition(s) as documented were addressed during this visit. Due to the added complexity in care, I will continue to support Maria Ines in the subsequent management and with ongoing continuity of care.          History of Present Illness/Subjective    Indication for visit:  Maria Ines Molina returns for follow up of aortic stenosis s/p AVR and was last seen on 9/202/2024 by Carissa Patel.      HPI: Maria Ines Molina is a 69 year old female with a history of HTN, bicuspid aortic stenosis with aortopathy who presents to establish care with cardiology after recent elective repair.       She reports some discomfort in her chest at the incision some exertional dyspnea now, no orthopnea or PND.  Random nonexertional pain over left chest.      I reviewed notes from PCP, CV surgery prior to this visit.         Physical Examination  Past Cardiac History   Vitals: /70 (BP Location: Left arm, Patient Position: Sitting, Cuff Size: Adult Large)   Pulse 87   Resp 16   Ht 1.664 m (5' 5.5\")   Wt 91.2 kg (201 lb)   SpO2 96%   BMI 32.94 kg/m    BMI= Body mass index is 32.94 kg/m .  Wt Readings from Last 3 " Encounters:   10/07/24 91.2 kg (201 lb)   10/01/24 90.7 kg (200 lb)   09/20/24 89.8 kg (198 lb)       General Appearance:   no distress, normal body habitus   ENT/Mouth: membranes moist, no oral lesions or bleeding gums.      EYES:  no scleral icterus, normal conjunctivae   Neck: no carotid bruits or thyromegaly   Chest/Lungs:   lungs are clear to auscultation, no rales or wheezing,  sternal scar, equal chest wall expansion    Cardiovascular:   Regular. Normal first and second heart sounds with no murmurs, rubs, or gallops; the carotid, radial and posterior tibial pulses are intact, Jugular venous pressure normal, No edema bilaterally    Abdomen:  no organomegaly, masses, bruits, or tenderness; bowel sounds are present   Extremities: no cyanosis or clubbing   Skin: no xanthelasma, warm, upper sternal incision with mild separation   Neurologic: normal  bilateral, no tremors           Bicuspid aortic stenosis with aortopathy: s/p 27mm Goss Inspiris bio-AVR and 30mm Gelweave graft from STJ to ascending aorta 9/5/2024, no CABG needed        Most Recent Echocardiogram: 9/10/2024  1. The left ventricle is normal in size. Left ventricular function is normal.  The ejection fraction is 55-60%.  There is mild concentric left ventricular hypertrophy.  2. Normal right ventricle size and systolic function.  3. Well seated 27 mm Goss Inspiris bioprosthetic valve in aortic position.  Normal gradients with peak oziel: 1.9 m/sec, mean gradient: 8 mm Hg, DVI: 0.71,  EAGLE: 2.9 cm2, SVi: 46 ml/m2. No paravalvular regurgitation.  4. 30 mm Gelweave graft from the sinotubular junction to ascending aorta noted  (unable to visualize distal anastomosis).     Compared to the prior study dated 7/23/2024, there is interval replacement of  native aortic valve with aortic bioprosthetic valve and ascending aortic  graft.    Most Recent Stress Test: None    Most Recent Angiogram: 3/1/2024  LEFT MAIN: Normal size, short vessel that bifurcates  into left anterior descending and left circumflex arteries.  The left main has mild luminal irregularities.  LEFT ANTERIOR DESCENDING: Normal size vessel that gives rise to a large diagonal branch, multiple medium diagonal 2 and 3 branches, multiple septal perforators.  The LAD wraps around the apex distally.  The very proximal LAD has 30% stenosis followed by mild to moderate diffuse disease distally.  The diagonal branches have mild to moderate diffuse disease.  LEFT CIRCUMFLEX: Nondominant vessel that gives rise to a large bifurcating OM branch and terminates into a small vessel distally.  The left circumflex and its branches have mild diffuse disease.  RIGHT CORONARY ARTERY: Large dominant vessel that gives rise to right posterior descending and posterolateral system.  The proximal to mid right coronary artery has a 50% ossific stenosis followed by mild diffuse disease of the right coronary artery and its branches distally.    ECG (reviewed by myself): 2024 NSR 67 bpm           Medical History  Family History Social History   Past Medical History:   Diagnosis Date    Bicuspid aortic valve determined by imaging 2021    Hyperlipidemia     Hypertension     Obesity     Thoracic aortic aneurysm (H)      Father hyperlipidemia    Social History     Socioeconomic History    Marital status:      Spouse name: Not on file    Number of children: Not on file    Years of education: Not on file    Highest education level: Not on file   Occupational History    Not on file   Tobacco Use    Smoking status: Former     Current packs/day: 0.00     Types: Cigarettes     Quit date: 1984     Years since quittin.7    Smokeless tobacco: Never   Substance and Sexual Activity    Alcohol use: Not Currently    Drug use: Never    Sexual activity: Not on file   Other Topics Concern    Not on file   Social History Narrative    Not on file     Social Determinants of Health     Financial Resource Strain: Low Risk   (9/9/2024)    Financial Resource Strain     Within the past 12 months, have you or your family members you live with been unable to get utilities (heat, electricity) when it was really needed?: No   Food Insecurity: Low Risk  (9/9/2024)    Food Insecurity     Within the past 12 months, did you worry that your food would run out before you got money to buy more?: No     Within the past 12 months, did the food you bought just not last and you didn t have money to get more?: No   Transportation Needs: High Risk (9/9/2024)    Transportation Needs     Within the past 12 months, has lack of transportation kept you from medical appointments, getting your medicines, non-medical meetings or appointments, work, or from getting things that you need?: Yes   Physical Activity: Insufficiently Active (3/7/2024)    Received from [facility]    Exercise Vital Sign     Days of Exercise per Week: 4 days     Minutes of Exercise per Session: 30 min   Stress: Not on file   Social Connections: Not on file   Interpersonal Safety: High Risk (9/5/2024)    Interpersonal Safety     Do you feel physically and emotionally safe where you currently live?: No     Within the past 12 months, have you been hit, slapped, kicked or otherwise physically hurt by someone?: No     Within the past 12 months, have you been humiliated or emotionally abused in other ways by your partner or ex-partner?: No   Housing Stability: Low Risk  (9/9/2024)    Housing Stability     Do you have housing? : Yes     Are you worried about losing your housing?: No           Medications  Allergies   Current Outpatient Medications   Medication Sig Dispense Refill    apixaban ANTICOAGULANT (ELIQUIS) 5 MG tablet Take 1 tablet (5 mg) by mouth 2 times daily. Ok to stop 3 months postop (12/5/2024) 60 tablet 3    aspirin (ASA) 81 MG chewable tablet Take 1 tablet (81 mg) by mouth daily. 90 tablet 3    metoprolol tartrate 37.5 MG TABS Take 37.5 mg by mouth 2 times daily. 60 tablet 3     "multivitamin w/minerals (THERA-VIT-M) tablet Take 1 tablet by mouth daily      povidone-iodine (BETADINE) 10 % topical solution Paint incision w/ betadine paint once a day for 7 days. 30 mL 0     No Known Allergies       Lab Results    Chemistry/lipid CBC Cardiac Enzymes/BNP/TSH/INR   No results for input(s): \"CHOL\", \"HDL\", \"LDL\", \"TRIG\", \"CHOLHDLRATIO\" in the last 59827 hours.  No results for input(s): \"LDL\" in the last 10439 hours.  Recent Labs   Lab Test 09/11/24  0425 09/09/24  0422 09/08/24  0746 09/08/24  0430   NA  --   --   --  137   POTASSIUM 3.8   < >  --  4.1   CHLORIDE  --   --   --  99   CO2  --   --   --  30*   GLC  --   --  111* 110*   BUN  --   --   --  13.9   CR  --   --   --  0.70   GFRESTIMATED  --   --   --  >90   LAUREL  --   --   --  8.5*    < > = values in this interval not displayed.     Recent Labs   Lab Test 09/08/24  0430 09/06/24  0416 09/05/24  1301   CR 0.70 0.65 0.68     Recent Labs   Lab Test 08/28/24  0827   A1C 5.5          Recent Labs   Lab Test 09/09/24  0422 09/06/24  0416   WBC  --  8.7   HGB  --  9.6*   HCT  --  29.0*   MCV  --  95   * 92*     Recent Labs   Lab Test 09/06/24  0416 09/05/24  1318 09/05/24  1301   HGB 9.6* 9.5* 10.7*    Recent Labs   Lab Test 03/26/21  1753 03/26/21  1421   TROPONINI 0.02 <0.01     Recent Labs   Lab Test 03/26/21  1421   BNP 11     No results for input(s): \"TSH\" in the last 07687 hours.  Recent Labs   Lab Test 09/10/24  0429 09/09/24  1050 09/05/24  1301   INR 1.02 1.01 1.48*        Ava Bull MD  Noninvasive Cardiologist   [facility]                                    "

## 2024-10-01 ENCOUNTER — OFFICE VISIT (OUTPATIENT)
Dept: CARDIOLOGY | Facility: CLINIC | Age: 69
End: 2024-10-01
Payer: MEDICARE

## 2024-10-01 VITALS
WEIGHT: 200 LBS | SYSTOLIC BLOOD PRESSURE: 126 MMHG | BODY MASS INDEX: 33.28 KG/M2 | DIASTOLIC BLOOD PRESSURE: 84 MMHG | HEART RATE: 68 BPM | RESPIRATION RATE: 14 BRPM

## 2024-10-01 DIAGNOSIS — R09.89 OTHER SPECIFIED SYMPTOMS AND SIGNS INVOLVING THE CIRCULATORY AND RESPIRATORY SYSTEMS: ICD-10-CM

## 2024-10-01 DIAGNOSIS — Z95.2 S/P AVR: ICD-10-CM

## 2024-10-01 DIAGNOSIS — Z95.2 S/P AVR (AORTIC VALVE REPLACEMENT): Primary | ICD-10-CM

## 2024-10-01 DIAGNOSIS — T81.41XA SUPERFICIAL INCISIONAL INFECTION OF SURGICAL SITE: ICD-10-CM

## 2024-10-01 DIAGNOSIS — Z86.79 S/P ASCENDING AORTIC ANEURYSM REPAIR: ICD-10-CM

## 2024-10-01 DIAGNOSIS — Z98.890 S/P ASCENDING AORTIC ANEURYSM REPAIR: ICD-10-CM

## 2024-10-01 PROCEDURE — 99024 POSTOP FOLLOW-UP VISIT: CPT

## 2024-10-01 RX ORDER — METOPROLOL TARTRATE 37.5 MG/1
37.5 TABLET, FILM COATED ORAL 2 TIMES DAILY
Qty: 60 TABLET | Refills: 3 | Status: SHIPPED | OUTPATIENT
Start: 2024-10-01

## 2024-10-01 NOTE — PROGRESS NOTES
CARDIOTHORACIC SURGERY FOLLOW-UP VISIT     Maria Ines Molina   1955   [id number]      Reason for visit: Post operative clinic visit. Patient underwent bioAVR (27 mm Goss Inspiris), ascending aortic aneurysm repair, and left atrial appendage excision w/ Dr. Morales on 9/5/2024.     HPI: Maria Ines Molina is a 69 year old year old female seen in clinic for a routine follow-up appointment after surgery. Patient has past medical history as below. Hospital course was uncomplicated. Patient was discharged to home on POD#6.    Patient has been doing overall well since discharge. Patient did follow-up with primary care since leaving the hospital. Seen in our clinic last week for a wound check and found to have small area of dehiscence. Did not appear to be infected but patient reported purulent drainage at home so was sent w/ bactrim course and betadine paint x1 week. Since then, incision has improved. Denies fevers, peripheral edema. Appetite is improving and patient is voiding without difficulty. Weight has been stable. Pain management at this point with occasional tylenol. Patient has been attending cardiac rehab and this is going well. Patient is on anti-coagulation w/ eliquis. Does report some orthostasis.     PAST MEDICAL HISTORY:  Past Medical History:   Diagnosis Date    Bicuspid aortic valve determined by imaging 02/13/2021    Hyperlipidemia     Hypertension     Obesity     Thoracic aortic aneurysm (H)        PAST SURGICAL HISTORY:  Past Surgical History:   Procedure Laterality Date    COLONOSCOPY  10/11/2012    Diverticulosis, otherwise negative    CV CORONARY ANGIOGRAM N/A 3/1/2024    Procedure: Coronary Angiogram;  Surgeon: Jose Cruz Omalley MD;  Location: [facility] CATH LAB CV    REPLACE VALVE AORTIC N/A 9/5/2024    Procedure: AORTIC VALVE REPLACEMENT AND ASCENDING AORTIC REPLACEMENT, LEFT ATRIAL APPENDAGE EXCISION, EPIAORTIC ULTRASOUND;  Surgeon: Alfredo Morales MD;  [place] [facility] [place]     TRANSESOPHAGEAL ECHOCARDIOGRAM INTRAOPERATIVE N/A 9/5/2024    Procedure: ANESTHESIA TRANSESOPHAGEAL ECHOCARDIOGRAM;  Surgeon: Alfredo Morales MD;  Location: [facility] Main OR       CURRENT MEDICATIONS:     Current Outpatient Medications:     apixaban ANTICOAGULANT (ELIQUIS) 5 MG tablet, Take 1 tablet (5 mg) by mouth 2 times daily. Ok to stop 3 months postop (12/5/2024), Disp: 60 tablet, Rfl: 3    aspirin (ASA) 81 MG chewable tablet, Take 1 tablet (81 mg) by mouth daily., Disp: 90 tablet, Rfl: 3    metoprolol tartrate (LOPRESSOR) 50 MG tablet, Take 1 tablet (50 mg) by mouth 2 times daily., Disp: 180 tablet, Rfl: 3    multivitamin w/minerals (THERA-VIT-M) tablet, Take 1 tablet by mouth daily, Disp: , Rfl:     povidone-iodine (BETADINE) 10 % topical solution, Paint incision w/ betadine paint once a day for 7 days., Disp: 30 mL, Rfl: 0    ALLERGIES:    No Known Allergies    ROS:  Gen: No fevers, weight loss/gain  CV: Denies chest pain, peripheral edema  Pulm: Denies SOB  GI/: Voiding without problems, appetite improving.     LABS:  None    IMAGING:  None    PHYSICAL EXAM:   /84 (BP Location: Right arm, Patient Position: Sitting, Cuff Size: Adult Large)   Pulse 68   Resp 14   Wt 90.7 kg (200 lb)   BMI 33.28 kg/m    General: Alert and oriented, pleasant, no acute distress.  CV:  No peripheral edema.  Pulm: Easy work of breathing on room air.   GI: Soft, non-tender, and non-distended  Incision: Chest incision clean and dry. Small area of dehiscence appears improved from previous appt. More superficial and drier.  Neuro: CNs grossly intact.      ASSESSMENT/PLAN:  Maria Ines Molina is a 69 year old year old female status post bioAVR, ascending aortic aneurysm repair, and left atrial appendage excision who returns to clinic for postop visit.     - Surgically doing well. Incision is healing well for the most part. Small open area at the top. Continue betadine paint x7 days.  - Hemodynamics are stable.  Reduce metoprolol to 37.5 mg BID to help w/ dizziness.  - Follow up with your cardiologist as scheduled (10/7/2024)  - Continue Cardiac Rehab until completed.   - May start driving 10/3/2024 (4 weeks post-op) if not using narcotic pain medications.  - Continue strict sternal precautions until 10/31/2024. No lifting >10bs; may gradually increase at this point (8 weeks post-op).   - No need for further follow-up with CV surgery unless concerns. Feel free to call our office with questions. [phone]         Carissa Patel PA-C  [facility] Cardiothoracic Surgery  Pager: [phone]  October 1, 2024

## 2024-10-01 NOTE — PATIENT INSTRUCTIONS
- Surgically doing well. Incision is healing well for the most part. Small open area at the top. Continue betadine paint x7 days.  - Hemodynamics are stable. Reduce metoprolol to 37.5 mg BID to help w/ dizziness.  - Follow up with your cardiologist as scheduled (10/7/2024)  - Continue Cardiac Rehab until completed.   - May start driving 10/3/2024 (4 weeks post-op) if not using narcotic pain medications.  - Continue strict sternal precautions until 10/31/2024. No lifting >10bs; may gradually increase at this point (8 weeks post-op).   - No need for further follow-up with CV surgery unless concerns. Feel free to call our office with questions. [phone]

## 2024-10-02 ENCOUNTER — HOSPITAL ENCOUNTER (OUTPATIENT)
Dept: CARDIAC REHAB | Facility: HOSPITAL | Age: 69
Discharge: HOME | End: 2024-10-02
Attending: INTERNAL MEDICINE
Payer: MEDICARE

## 2024-10-02 PROCEDURE — 93798 PHYS/QHP OP CAR RHAB W/ECG: CPT

## 2024-10-04 ENCOUNTER — HOSPITAL ENCOUNTER (OUTPATIENT)
Dept: CARDIAC REHAB | Facility: HOSPITAL | Age: 69
Discharge: HOME | End: 2024-10-04
Attending: INTERNAL MEDICINE
Payer: MEDICARE

## 2024-10-04 PROCEDURE — 93798 PHYS/QHP OP CAR RHAB W/ECG: CPT

## 2024-10-07 ENCOUNTER — OFFICE VISIT (OUTPATIENT)
Dept: CARDIOLOGY | Facility: CLINIC | Age: 69
End: 2024-10-07
Payer: MEDICARE

## 2024-10-07 ENCOUNTER — HOSPITAL ENCOUNTER (OUTPATIENT)
Dept: CARDIAC REHAB | Facility: HOSPITAL | Age: 69
Discharge: HOME | End: 2024-10-07
Attending: INTERNAL MEDICINE
Payer: MEDICARE

## 2024-10-07 VITALS
HEIGHT: 66 IN | DIASTOLIC BLOOD PRESSURE: 70 MMHG | OXYGEN SATURATION: 96 % | BODY MASS INDEX: 32.3 KG/M2 | SYSTOLIC BLOOD PRESSURE: 100 MMHG | HEART RATE: 87 BPM | RESPIRATION RATE: 16 BRPM | WEIGHT: 201 LBS

## 2024-10-07 DIAGNOSIS — I71.21 ANEURYSM OF ASCENDING AORTA WITHOUT RUPTURE (H): ICD-10-CM

## 2024-10-07 DIAGNOSIS — Z95.2 AORTIC VALVE REPLACED: Primary | ICD-10-CM

## 2024-10-07 DIAGNOSIS — I10 PRIMARY HYPERTENSION: ICD-10-CM

## 2024-10-07 DIAGNOSIS — E78.5 HYPERLIPIDEMIA LDL GOAL <70: ICD-10-CM

## 2024-10-07 PROCEDURE — 93798 PHYS/QHP OP CAR RHAB W/ECG: CPT

## 2024-10-07 PROCEDURE — G2211 COMPLEX E/M VISIT ADD ON: HCPCS | Performed by: INTERNAL MEDICINE

## 2024-10-07 PROCEDURE — 99204 OFFICE O/P NEW MOD 45 MIN: CPT | Performed by: INTERNAL MEDICINE

## 2024-10-07 NOTE — PATIENT INSTRUCTIONS
OK to decrease evening Metoprolol to 37.5mg.  If still seeing reads in the 100 range let Dr Bull know and we can taper further.       OK to stop the Eliquis after 12/5/2024, no need to taper off, can just stop taking     Will plan to check yearly echocardiogram to monitor the replaced arabella and aorta, due 9/2025

## 2024-10-07 NOTE — LETTER
10/7/2024    Trinity Lemus, RUIZ  [address]  [place] [address]    RE: Maria Ines Molina       Dear Colleague,     I had the pleasure of seeing Maria Ines Molina in the [facility].    HEART CARE FOLLOW UP NOTE      [facility]  [phone]      Assessment/Recommendations   Assessment: 69 year old female with recent bio-AVR and aneurysm repair    Plan:  Aortic stenosis with aortopathy  Doing well after repair 9/5/2024, on antibiotics for sternal wound infection      -Continue Eliquis 5mg BID x 3 months, 12/5/2024      -Annual echocardiogram due 9/2025      -Her 1st degree have been advised to get screening, daughter got screened and negative but others have not followed through       -Return to clinic 1 year     HTN  A bit low, no orthostatic symptoms       -Continue Lopressor 50mg in am and 37.5mg in pm     Hyperlipidemia   LDL = 95      -Lifestyle modification      -Add Lipitor 20mg for LDL < 70 for nonocclusive CAD on angiogram     The longitudinal plan of care for the diagnosis(es)/condition(s) as documented were addressed during this visit. Due to the added complexity in care, I will continue to support Maria Ines in the subsequent management and with ongoing continuity of care.          History of Present Illness/Subjective    Indication for visit:  Maria Ines Molina returns for follow up of aortic stenosis s/p AVR and was last seen on 9/202/2024 by Carissa Patel.      HPI: Maria Ines Molina is a 69 year old female with a history of HTN, bicuspid aortic stenosis with aortopathy who presents to establish care with cardiology after recent elective repair.       She reports some discomfort in her chest at the incision some exertional dyspnea now, no orthopnea or PND.  Random nonexertional pain over left chest.      I reviewed notes from PCP, CV surgery prior to this visit.         Physical Examination  Past Cardiac History   Vitals: /70 (BP Location: Left arm, Patient Position:  "Sitting, Cuff Size: Adult Large)   Pulse 87   Resp 16   Ht 1.664 m (5' 5.5\")   Wt 91.2 kg (201 lb)   SpO2 96%   BMI 32.94 kg/m    BMI= Body mass index is 32.94 kg/m .  Wt Readings from Last 3 Encounters:   10/07/24 91.2 kg (201 lb)   10/01/24 90.7 kg (200 lb)   09/20/24 89.8 kg (198 lb)       General Appearance:   no distress, normal body habitus   ENT/Mouth: membranes moist, no oral lesions or bleeding gums.      EYES:  no scleral icterus, normal conjunctivae   Neck: no carotid bruits or thyromegaly   Chest/Lungs:   lungs are clear to auscultation, no rales or wheezing,  sternal scar, equal chest wall expansion    Cardiovascular:   Regular. Normal first and second heart sounds with no murmurs, rubs, or gallops; the carotid, radial and posterior tibial pulses are intact, Jugular venous pressure normal, No edema bilaterally    Abdomen:  no organomegaly, masses, bruits, or tenderness; bowel sounds are present   Extremities: no cyanosis or clubbing   Skin: no xanthelasma, warm, upper sternal incision with mild separation   Neurologic: normal  bilateral, no tremors           Bicuspid aortic stenosis with aortopathy: s/p 27mm Goss Inspiris bio-AVR and 30mm Gelweave graft from STJ to ascending aorta 9/5/2024, no CABG needed        Most Recent Echocardiogram: 9/10/2024  1. The left ventricle is normal in size. Left ventricular function is normal.  The ejection fraction is 55-60%.  There is mild concentric left ventricular hypertrophy.  2. Normal right ventricle size and systolic function.  3. Well seated 27 mm Goss Inspiris bioprosthetic valve in aortic position.  Normal gradients with peak oziel: 1.9 m/sec, mean gradient: 8 mm Hg, DVI: 0.71,  EAGEL: 2.9 cm2, SVi: 46 ml/m2. No paravalvular regurgitation.  4. 30 mm Gelweave graft from the sinotubular junction to ascending aorta noted  (unable to visualize distal anastomosis).     Compared to the prior study dated 7/23/2024, there is interval replacement " of  native aortic valve with aortic bioprosthetic valve and ascending aortic  graft.    Most Recent Stress Test: None    Most Recent Angiogram: 3/1/2024  LEFT MAIN: Normal size, short vessel that bifurcates into left anterior descending and left circumflex arteries.  The left main has mild luminal irregularities.  LEFT ANTERIOR DESCENDING: Normal size vessel that gives rise to a large diagonal branch, multiple medium diagonal 2 and 3 branches, multiple septal perforators.  The LAD wraps around the apex distally.  The very proximal LAD has 30% stenosis followed by mild to moderate diffuse disease distally.  The diagonal branches have mild to moderate diffuse disease.  LEFT CIRCUMFLEX: Nondominant vessel that gives rise to a large bifurcating OM branch and terminates into a small vessel distally.  The left circumflex and its branches have mild diffuse disease.  RIGHT CORONARY ARTERY: Large dominant vessel that gives rise to right posterior descending and posterolateral system.  The proximal to mid right coronary artery has a 50% ossific stenosis followed by mild diffuse disease of the right coronary artery and its branches distally.    ECG (reviewed by myself): 2024 NSR 67 bpm           Medical History  Family History Social History   Past Medical History:   Diagnosis Date     Bicuspid aortic valve determined by imaging 2021     Hyperlipidemia      Hypertension      Obesity      Thoracic aortic aneurysm (H)      Father hyperlipidemia    Social History     Socioeconomic History     Marital status:      Spouse name: Not on file     Number of children: Not on file     Years of education: Not on file     Highest education level: Not on file   Occupational History     Not on file   Tobacco Use     Smoking status: Former     Current packs/day: 0.00     Types: Cigarettes     Quit date: 1984     Years since quittin.7     Smokeless tobacco: Never   Substance and Sexual Activity     Alcohol use: Not  Currently     Drug use: Never     Sexual activity: Not on file   Other Topics Concern     Not on file   Social History Narrative     Not on file     Social Determinants of Health     Financial Resource Strain: Low Risk  (9/9/2024)    Financial Resource Strain      Within the past 12 months, have you or your family members you live with been unable to get utilities (heat, electricity) when it was really needed?: No   Food Insecurity: Low Risk  (9/9/2024)    Food Insecurity      Within the past 12 months, did you worry that your food would run out before you got money to buy more?: No      Within the past 12 months, did the food you bought just not last and you didn t have money to get more?: No   Transportation Needs: High Risk (9/9/2024)    Transportation Needs      Within the past 12 months, has lack of transportation kept you from medical appointments, getting your medicines, non-medical meetings or appointments, work, or from getting things that you need?: Yes   Physical Activity: Insufficiently Active (3/7/2024)    Received from [facility]    Exercise Vital Sign      Days of Exercise per Week: 4 days      Minutes of Exercise per Session: 30 min   Stress: Not on file   Social Connections: Not on file   Interpersonal Safety: High Risk (9/5/2024)    Interpersonal Safety      Do you feel physically and emotionally safe where you currently live?: No      Within the past 12 months, have you been hit, slapped, kicked or otherwise physically hurt by someone?: No      Within the past 12 months, have you been humiliated or emotionally abused in other ways by your partner or ex-partner?: No   Housing Stability: Low Risk  (9/9/2024)    Housing Stability      Do you have housing? : Yes      Are you worried about losing your housing?: No           Medications  Allergies   Current Outpatient Medications   Medication Sig Dispense Refill     apixaban ANTICOAGULANT (ELIQUIS) 5 MG tablet Take 1 tablet (5 mg) by mouth 2 times  "daily. Ok to stop 3 months postop (12/5/2024) 60 tablet 3     aspirin (ASA) 81 MG chewable tablet Take 1 tablet (81 mg) by mouth daily. 90 tablet 3     metoprolol tartrate 37.5 MG TABS Take 37.5 mg by mouth 2 times daily. 60 tablet 3     multivitamin w/minerals (THERA-VIT-M) tablet Take 1 tablet by mouth daily       povidone-iodine (BETADINE) 10 % topical solution Paint incision w/ betadine paint once a day for 7 days. 30 mL 0     No Known Allergies       Lab Results    Chemistry/lipid CBC Cardiac Enzymes/BNP/TSH/INR   No results for input(s): \"CHOL\", \"HDL\", \"LDL\", \"TRIG\", \"CHOLHDLRATIO\" in the last 05823 hours.  No results for input(s): \"LDL\" in the last 47388 hours.  Recent Labs   Lab Test 09/11/24 0425 09/09/24  0422 09/08/24  0746 09/08/24  0430   NA  --   --   --  137   POTASSIUM 3.8   < >  --  4.1   CHLORIDE  --   --   --  99   CO2  --   --   --  30*   GLC  --   --  111* 110*   BUN  --   --   --  13.9   CR  --   --   --  0.70   GFRESTIMATED  --   --   --  >90   LAUREL  --   --   --  8.5*    < > = values in this interval not displayed.     Recent Labs   Lab Test 09/08/24  0430 09/06/24  0416 09/05/24  1301   CR 0.70 0.65 0.68     Recent Labs   Lab Test 08/28/24  0827   A1C 5.5          Recent Labs   Lab Test 09/09/24  0422 09/06/24  0416   WBC  --  8.7   HGB  --  9.6*   HCT  --  29.0*   MCV  --  95   * 92*     Recent Labs   Lab Test 09/06/24  0416 09/05/24  1318 09/05/24  1301   HGB 9.6* 9.5* 10.7*    Recent Labs   Lab Test 03/26/21  1753 03/26/21  1421   TROPONINI 0.02 <0.01     Recent Labs   Lab Test 03/26/21  1421   BNP 11     No results for input(s): \"TSH\" in the last 29174 hours.  Recent Labs   Lab Test 09/10/24  0429 09/09/24  1050 09/05/24  1301   INR 1.02 1.01 1.48*        Ava Bull MD  Noninvasive Cardiologist   [facility]                                        Thank you for allowing me to participate in the care of your patient.      Sincerely,     Ava Bull MD     " [facility]  cc:   No referring provider defined for this encounter.

## 2024-10-09 ENCOUNTER — HOSPITAL ENCOUNTER (OUTPATIENT)
Dept: CARDIAC REHAB | Facility: HOSPITAL | Age: 69
Discharge: HOME | End: 2024-10-09
Attending: INTERNAL MEDICINE
Payer: MEDICARE

## 2024-10-09 PROCEDURE — 93798 PHYS/QHP OP CAR RHAB W/ECG: CPT

## 2024-10-11 ENCOUNTER — HOSPITAL ENCOUNTER (OUTPATIENT)
Dept: CARDIAC REHAB | Facility: HOSPITAL | Age: 69
Discharge: HOME | End: 2024-10-11
Attending: INTERNAL MEDICINE
Payer: MEDICARE

## 2024-10-11 PROCEDURE — 93798 PHYS/QHP OP CAR RHAB W/ECG: CPT

## 2024-10-14 ENCOUNTER — TELEPHONE (OUTPATIENT)
Dept: CARDIOLOGY | Facility: CLINIC | Age: 69
End: 2024-10-14
Payer: MEDICARE

## 2024-10-14 ENCOUNTER — HOSPITAL ENCOUNTER (OUTPATIENT)
Dept: CT IMAGING | Facility: HOSPITAL | Age: 69
Discharge: HOME | End: 2024-10-14
Attending: SURGERY | Admitting: SURGERY
Payer: MEDICARE

## 2024-10-14 ENCOUNTER — HOSPITAL ENCOUNTER (OUTPATIENT)
Dept: CARDIAC REHAB | Facility: HOSPITAL | Age: 69
Discharge: HOME | End: 2024-10-14
Attending: INTERNAL MEDICINE
Payer: MEDICARE

## 2024-10-14 DIAGNOSIS — R07.81 RIB PAIN: ICD-10-CM

## 2024-10-14 DIAGNOSIS — R07.81 RIB PAIN: Primary | ICD-10-CM

## 2024-10-14 PROCEDURE — 93798 PHYS/QHP OP CAR RHAB W/ECG: CPT

## 2024-10-14 PROCEDURE — 71250 CT THORAX DX C-: CPT | Mod: MG

## 2024-10-14 NOTE — TELEPHONE ENCOUNTER
CT ordered.    -------------------------------------------------------------------------------------------------  Alfredo Morales MD O'Brien, Jessica, RN  Phone Number: [phone]     Hi Sierra    Can we please get a CT scan on her? And I can see her in clinic. Her daughter used to work with my wife.    Thank you  Sunil Morales MD20 hours ago (3:56 PM)       It also seems as though I'm accumulating fluid in this area. Just feel fatter and not firm .        Maria Ines Morales MD22 hours ago (2:21 PM)       I have been having pain at bottom of ribs that extends towards my drain tube incisions that started Monday night. . Pain is mostly when I lay down on my back. It has stayed the same or maybe increased since Monday night. Mentioned it on Friday to PT staff while at rehab. They thought if no improvement to contact you.   During the day when I'm up and about, don't really feel it. Otherwise, I seem to feel ok.  Is is possible to see someone on Monday when I'm at rehab at 10:00 or around that time?   Thank you.

## 2024-10-15 ENCOUNTER — TELEPHONE (OUTPATIENT)
Dept: CARDIOLOGY | Facility: CLINIC | Age: 69
End: 2024-10-15

## 2024-10-15 ENCOUNTER — OFFICE VISIT (OUTPATIENT)
Dept: CARDIOLOGY | Facility: CLINIC | Age: 69
End: 2024-10-15
Payer: MEDICARE

## 2024-10-15 VITALS
WEIGHT: 203 LBS | RESPIRATION RATE: 16 BRPM | HEART RATE: 84 BPM | DIASTOLIC BLOOD PRESSURE: 88 MMHG | BODY MASS INDEX: 33.27 KG/M2 | SYSTOLIC BLOOD PRESSURE: 134 MMHG

## 2024-10-15 DIAGNOSIS — R07.81 RIB PAIN: Primary | ICD-10-CM

## 2024-10-15 PROCEDURE — 99024 POSTOP FOLLOW-UP VISIT: CPT | Performed by: SURGERY

## 2024-10-15 NOTE — LETTER
10/15/2024    Trinity Lemus NP  [address]  [place] [address]    RE: Maria Ines Wetzelx       Dear Colleague,     I had the pleasure of seeing Maria Ines Molina in the [facility].  Cardiac surgery     Ms. Molina is a 67 year-old woman with a history of bicuspid aortic valve with known moderate-severe aortic stenosis, as well as mild aortic regurgitation and an ascending aortic aneurysm measuring 5 cm.  On 9/5/2024, she underwent Wheat procedure (Aortic valve replacement with a 27 mm Goss Inspiris bioprosthetic valve and ascending aortic replacement with a 30 mm Gelweave graft from the sinotubular junction to the distal ascending aorta), as well as left atrial appendage excision. She had an eventful recovery and was discharged home on 09/11/24 (POD#6).    She now returns with pain at the lower aspect of the incision. CT chest is unrevealing with normal healing. There is no evidence of hernia or sternal dehiscence. I recommend expectant management. She will alert us if this is not resolving.    Alfredo Morales MD      Thank you for allowing me to participate in the care of your patient.      Sincerely,     Alfredo Morales MD     [facility]  cc:   Alfredo Morales MD  [address] [address]

## 2024-10-15 NOTE — TELEPHONE ENCOUNTER
Lm with Pt to let her know of time change for [facility] appt due to surgery going longer. Asked pt to call back to confirm

## 2024-10-16 ENCOUNTER — TELEPHONE (OUTPATIENT)
Dept: CARDIOLOGY | Facility: CLINIC | Age: 69
End: 2024-10-16
Payer: MEDICARE

## 2024-10-16 ENCOUNTER — HOSPITAL ENCOUNTER (OUTPATIENT)
Dept: CARDIAC REHAB | Facility: HOSPITAL | Age: 69
Discharge: HOME | End: 2024-10-16
Attending: INTERNAL MEDICINE
Payer: MEDICARE

## 2024-10-16 PROCEDURE — 93798 PHYS/QHP OP CAR RHAB W/ECG: CPT

## 2024-10-28 ENCOUNTER — HOSPITAL ENCOUNTER (OUTPATIENT)
Dept: CARDIAC REHAB | Facility: HOSPITAL | Age: 69
Discharge: HOME | End: 2024-10-28
Attending: INTERNAL MEDICINE
Payer: MEDICARE

## 2024-10-28 PROCEDURE — 93798 PHYS/QHP OP CAR RHAB W/ECG: CPT

## 2024-10-29 NOTE — PROGRESS NOTES
Cardiac surgery     Ms. Molina is a 67 year-old woman with a history of bicuspid aortic valve with known moderate-severe aortic stenosis, as well as mild aortic regurgitation and an ascending aortic aneurysm measuring 5 cm.  On 9/5/2024, she underwent Wheat procedure (Aortic valve replacement with a 27 mm Goss Inspiris bioprosthetic valve and ascending aortic replacement with a 30 mm Gelweave graft from the sinotubular junction to the distal ascending aorta), as well as left atrial appendage excision. She had an eventful recovery and was discharged home on 09/11/24 (POD#6).    She now returns with pain at the lower aspect of the incision. CT chest is unrevealing with normal healing. There is no evidence of hernia or sternal dehiscence. I recommend expectant management. She will alert us if this is not resolving.    Alfredo Morales MD

## 2024-11-06 ENCOUNTER — HOSPITAL ENCOUNTER (OUTPATIENT)
Dept: CARDIAC REHAB | Facility: HOSPITAL | Age: 69
Discharge: HOME | End: 2024-11-06
Attending: INTERNAL MEDICINE
Payer: MEDICARE

## 2024-11-06 PROCEDURE — 93798 PHYS/QHP OP CAR RHAB W/ECG: CPT

## 2024-11-13 ENCOUNTER — HOSPITAL ENCOUNTER (OUTPATIENT)
Dept: CARDIAC REHAB | Facility: HOSPITAL | Age: 69
Discharge: HOME | End: 2024-11-13
Attending: INTERNAL MEDICINE
Payer: MEDICARE

## 2024-11-13 PROCEDURE — 93798 PHYS/QHP OP CAR RHAB W/ECG: CPT

## 2024-11-15 ENCOUNTER — HOSPITAL ENCOUNTER (OUTPATIENT)
Dept: CT IMAGING | Facility: HOSPITAL | Age: 69
Discharge: HOME | End: 2024-11-15
Attending: SURGERY | Admitting: SURGERY
Payer: MEDICARE

## 2024-11-15 ENCOUNTER — HOSPITAL ENCOUNTER (OUTPATIENT)
Dept: CARDIAC REHAB | Facility: HOSPITAL | Age: 69
Discharge: HOME | End: 2024-11-15
Attending: INTERNAL MEDICINE
Payer: MEDICARE

## 2024-11-15 DIAGNOSIS — R07.81 RIB PAIN: ICD-10-CM

## 2024-11-15 PROCEDURE — G1010 CDSM STANSON: HCPCS

## 2024-11-15 PROCEDURE — 93798 PHYS/QHP OP CAR RHAB W/ECG: CPT

## 2024-11-15 PROCEDURE — 71250 CT THORAX DX C-: CPT | Mod: MG

## 2024-11-19 ENCOUNTER — TELEPHONE (OUTPATIENT)
Dept: CARDIOLOGY | Facility: CLINIC | Age: 69
End: 2024-11-19

## 2024-11-19 ENCOUNTER — OFFICE VISIT (OUTPATIENT)
Dept: CARDIOLOGY | Facility: CLINIC | Age: 69
End: 2024-11-19
Payer: MEDICARE

## 2024-11-19 VITALS
SYSTOLIC BLOOD PRESSURE: 148 MMHG | DIASTOLIC BLOOD PRESSURE: 96 MMHG | BODY MASS INDEX: 33.11 KG/M2 | WEIGHT: 206 LBS | HEART RATE: 74 BPM | RESPIRATION RATE: 16 BRPM | HEIGHT: 66 IN

## 2024-11-19 DIAGNOSIS — Z95.3 HISTORY OF AORTIC VALVE REPLACEMENT WITH BIOPROSTHETIC VALVE: ICD-10-CM

## 2024-11-19 DIAGNOSIS — Z98.890 HISTORY OF THORACIC AORTIC ANEURYSM REPAIR: ICD-10-CM

## 2024-11-19 DIAGNOSIS — I25.10 CORONARY ARTERY DISEASE INVOLVING NATIVE CORONARY ARTERY OF NATIVE HEART WITHOUT ANGINA PECTORIS: Primary | ICD-10-CM

## 2024-11-19 DIAGNOSIS — Z86.79 HISTORY OF THORACIC AORTIC ANEURYSM REPAIR: ICD-10-CM

## 2024-11-19 PROCEDURE — 99024 POSTOP FOLLOW-UP VISIT: CPT | Performed by: SURGERY

## 2024-11-19 NOTE — PROGRESS NOTES
Cardiac surgery      Ms. Molina is a 67 year-old woman with a history of bicuspid aortic valve with known moderate-severe aortic stenosis, as well as mild aortic regurgitation and an ascending aortic aneurysm measuring 5 cm.  On 9/5/2024, she underwent Wheat procedure (Aortic valve replacement with a 27 mm Goss Inspiris bioprosthetic valve and ascending aortic replacement with a 30 mm Gelweave graft from the sinotubular junction to the distal ascending aorta), as well as left atrial appendage excision. She had an eventful recovery and was discharged home on POD#6.     She has had pain at the lower aspect of the incision. CT chest has been unrevealing with normal healing. There has been no evidence of hernia or sternal dehiscence.She can resume swimming and her other normal activities. She will have medical follow up for her hypertension with her cardiologist and primary care provider.    Alfredo Morales MD

## 2024-11-19 NOTE — LETTER
11/19/2024    Trinity Lemus NP  [address]  [place] [address]    RE: Maria Ines Chewieux       Dear Colleague,     I had the pleasure of seeing Maria Ines Molina in the [facility].  Cardiac surgery      Ms. Molina is a 67 year-old woman with a history of bicuspid aortic valve with known moderate-severe aortic stenosis, as well as mild aortic regurgitation and an ascending aortic aneurysm measuring 5 cm.  On 9/5/2024, she underwent Wheat procedure (Aortic valve replacement with a 27 mm Goss Inspiris bioprosthetic valve and ascending aortic replacement with a 30 mm Gelweave graft from the sinotubular junction to the distal ascending aorta), as well as left atrial appendage excision. She had an eventful recovery and was discharged home on POD#6.     She has had pain at the lower aspect of the incision. CT chest has been unrevealing with normal healing. There has been no evidence of hernia or sternal dehiscence.She can resume swimming and her other normal activities. She will have medical follow up for her hypertension with her cardiologist and primary care provider.    Alfredo Morales MD         Thank you for allowing me to participate in the care of your patient.      Sincerely,     Alfredo Morales MD     [facility]  cc:   Alfredo Morales MD  [address] [address]

## 2025-07-29 ENCOUNTER — TRANSFERRED RECORDS (OUTPATIENT)
Dept: HEALTH INFORMATION MANAGEMENT | Facility: OTHER | Age: 70
End: 2025-07-29
Payer: MEDICARE

## 2025-08-06 ENCOUNTER — LAB REQUISITION (OUTPATIENT)
Dept: LAB | Facility: CLINIC | Age: 70
End: 2025-08-06
Payer: MEDICARE

## 2025-08-06 DIAGNOSIS — M25.469 EFFUSION, UNSPECIFIED KNEE: ICD-10-CM

## 2025-08-06 DIAGNOSIS — M25.50 PAIN IN UNSPECIFIED JOINT: ICD-10-CM

## 2025-08-06 LAB
% LINING CELLS, BODY FLUID: 15 %
% LINING CELLS, BODY FLUID: 8 %
APPEARANCE FLD: CLEAR
APPEARANCE FLD: CLEAR
COLOR FLD: YELLOW
COLOR FLD: YELLOW
CRYSTALS SNV MICRO: NORMAL
CRYSTALS SNV MICRO: NORMAL
LYMPHOCYTES NFR FLD MANUAL: 42 %
LYMPHOCYTES NFR FLD MANUAL: 66 %
MONOS+MACROS NFR FLD MANUAL: 15 %
MONOS+MACROS NFR FLD MANUAL: 8 %
NEUTS BAND NFR FLD MANUAL: 11 %
NEUTS BAND NFR FLD MANUAL: 35 %
SPECIMEN SOURCE FLD: NORMAL
SPECIMEN SOURCE FLD: NORMAL
WBC # FLD AUTO: 147 /UL
WBC # FLD AUTO: 218 /UL

## 2025-08-06 PROCEDURE — 89060 EXAM SYNOVIAL FLUID CRYSTALS: CPT | Mod: ORL | Performed by: INTERNAL MEDICINE

## 2025-08-06 PROCEDURE — 89051 BODY FLUID CELL COUNT: CPT | Mod: ORL | Performed by: INTERNAL MEDICINE

## (undated) DEVICE — SU PLEDGET SOFT TFE 3/8"X3/26"X1/16" PCP40

## (undated) DEVICE — SUTURE VICRYL+ 0 27IN CT-1 UND VCP260H

## (undated) DEVICE — KIT HAND CONTROL ACIST 014644 AR-P54

## (undated) DEVICE — GLOVE BIOGEL PI SZ 7.0 40870

## (undated) DEVICE — TUBING SMOKE EVAC PNEUMOCLEAR HIGH FLOW 0620050250

## (undated) DEVICE — SPONGE RAY-TEC 4X8" 7318

## (undated) DEVICE — SUTURE PDS 0 27IN CT1 + VIOLET PDP340H

## (undated) DEVICE — CUSTOM PACK CORONARY SAN5BCRHEA

## (undated) DEVICE — SU DERMABOND ADVANCED .7ML DNX12

## (undated) DEVICE — SIZER GRAFT 24-38MM 36320021

## (undated) DEVICE — 6 FR IMPULSE ANGIO DIAG CATH AL1  5 PACK

## (undated) DEVICE — SOL NACL 0.9% IRRIG 1000ML BOTTLE 2F7124

## (undated) DEVICE — PREP CHLORAPREP 26ML TINTED HI-LITE ORANGE 930815

## (undated) DEVICE — SYR ANGIOGRAPHY MULTIUSE KIT ACIST 014612

## (undated) DEVICE — SPONGE KITNER DISSECTING 7102*

## (undated) DEVICE — DRSG DRAIN 4X4" 7086

## (undated) DEVICE — SET CANNULATION TOUNIQUET TS-10061

## (undated) DEVICE — CATH ANGIO JUDKINS R4 6FRX100CM INFINITI 534621T

## (undated) DEVICE — HEMOSTASIS BONE OSTENE 2.5G SYNTHETIC 1503832

## (undated) DEVICE — CONNECTOR CARDIO BLAKE DRAIN BCC2

## (undated) DEVICE — SHTH INTRO 0.021IN ID 6FR DIA

## (undated) DEVICE — SUCTION DRY CHEST DRAIN OASIS 3600-100

## (undated) DEVICE — SURGICEL POWDER ABSORBABLE HEMOSTAT 3GM 3013SP

## (undated) DEVICE — SUCTION CANISTER MEDIVAC LINER 3000ML W/LID 65651-530

## (undated) DEVICE — LEAD PACER MYOCARDIAL BIPOLAR TEMPORARY 53CM 6495F

## (undated) DEVICE — NEEDLE HYPO 18X1-1/2 SAFETY 305918

## (undated) DEVICE — SU PDS II 2-0 CT 36"  Z357H

## (undated) DEVICE — SU DEVICE COR-KNOT MINI 031300

## (undated) DEVICE — SU PROLENE 4-0 SHDA 36" 8521H

## (undated) DEVICE — MANIFOLD KIT ANGIO AUTOMATED 014613

## (undated) DEVICE — SU DEVICE ENDO COR KNOT QUICK LOAD RELOAD 030874

## (undated) DEVICE — CATH SUCTION 14FR W/O CTRL DYND41962

## (undated) DEVICE — SLEEVE TR BAND RADIAL COMPRESSION DEVICE 24CM TRB24-REG

## (undated) DEVICE — DEVICE TISSUE STABILIZATION OCTOBASE 28707

## (undated) DEVICE — SOL WATER IRRIG 1000ML BOTTLE 2F7114

## (undated) DEVICE — ESU GROUND PAD ADULT REM W/15' CORD E7507DB

## (undated) DEVICE — SU ETHIBOND 2-0 SHDA 30" X563H

## (undated) DEVICE — SU PROLENE 4-0 RB-1DA 36" 8557H

## (undated) DEVICE — GLOVE BIOGEL PI SZ 6.5 40865

## (undated) DEVICE — PITCHER STERILE 1000ML  SSK9004A

## (undated) DEVICE — SU STERNAL WIRE SINGLE #6 046-032

## (undated) DEVICE — SUTURE MONOCRYL+ 4-0 PS-2 27IN MCP426H

## (undated) DEVICE — CELL SAVER

## (undated) DEVICE — PACK MINOR SINGLE BASIN SSK3001

## (undated) DEVICE — ELECTRODE DEFIB CADENCE 22550R

## (undated) DEVICE — Device

## (undated) DEVICE — SU ETHIBOND 2-0 RB-1DA 30" MX553

## (undated) DEVICE — SU MYOSTERNAL WIRE  046-267

## (undated) DEVICE — RX SURGIFLO HEMOSTATIC MATRIX W/THROMBIN 8ML 2994

## (undated) DEVICE — SU ETHIBOND EXCEL 2-0 RB-1 L30 MX523

## (undated) DEVICE — SU PDS II 3-0 SH 27" Z316H

## (undated) DEVICE — SU ETHIBOND 0 CT-1 CR 8X18" CX21D

## (undated) DEVICE — SU PROLENE 3-0 SHDA 36" 8522H

## (undated) DEVICE — CATH ANGIO JUDKINS JL3.5 6FRX100CM INFINITI 534618T

## (undated) DEVICE — GOWN IMPERVIOUS BREATHABLE SMART XLG 89045

## (undated) DEVICE — COUNTER NEEDLE ADH & FOAM 20CT 9106

## (undated) DEVICE — SU PROLENE 5-0 RB-2DA 30" 8710H

## (undated) DEVICE — CATH THORACIC STRAIGHT CLOTSTOP 28FR

## (undated) DEVICE — CUSTOM PACK CV ST JOES SCV5BCVHEA

## (undated) RX ORDER — FENTANYL CITRATE 50 UG/ML
INJECTION, SOLUTION INTRAMUSCULAR; INTRAVENOUS
Status: DISPENSED
Start: 2024-09-05

## (undated) RX ORDER — VANCOMYCIN HYDROCHLORIDE 1 G/20ML
INJECTION, POWDER, LYOPHILIZED, FOR SOLUTION INTRAVENOUS
Status: DISPENSED
Start: 2024-09-05

## (undated) RX ORDER — FENTANYL CITRATE 50 UG/ML
INJECTION, SOLUTION INTRAMUSCULAR; INTRAVENOUS
Status: DISPENSED
Start: 2024-03-01

## (undated) RX ORDER — DIAZEPAM 5 MG
TABLET ORAL
Status: DISPENSED
Start: 2024-03-01